# Patient Record
Sex: MALE | Race: WHITE | Employment: FULL TIME | URBAN - METROPOLITAN AREA
[De-identification: names, ages, dates, MRNs, and addresses within clinical notes are randomized per-mention and may not be internally consistent; named-entity substitution may affect disease eponyms.]

---

## 2019-03-06 ENCOUNTER — APPOINTMENT (OUTPATIENT)
Dept: GENERAL RADIOLOGY | Facility: CLINIC | Age: 49
DRG: 234 | End: 2019-03-06
Attending: EMERGENCY MEDICINE
Payer: COMMERCIAL

## 2019-03-06 ENCOUNTER — HOSPITAL ENCOUNTER (INPATIENT)
Facility: CLINIC | Age: 49
LOS: 11 days | Discharge: NURSING, REHAB OR LONG-TERM CARE | DRG: 234 | End: 2019-03-18
Attending: EMERGENCY MEDICINE | Admitting: HOSPITALIST
Payer: COMMERCIAL

## 2019-03-06 DIAGNOSIS — I21.4 NSTEMI (NON-ST ELEVATED MYOCARDIAL INFARCTION) (H): ICD-10-CM

## 2019-03-06 DIAGNOSIS — I25.10 CAD IN NATIVE ARTERY: Primary | ICD-10-CM

## 2019-03-06 DIAGNOSIS — Z95.1 S/P CABG (CORONARY ARTERY BYPASS GRAFT): ICD-10-CM

## 2019-03-06 DIAGNOSIS — E87.6 HYPOKALEMIA: ICD-10-CM

## 2019-03-06 LAB
ANION GAP SERPL CALCULATED.3IONS-SCNC: 10 MMOL/L (ref 3–14)
BASOPHILS # BLD AUTO: 0.1 10E9/L (ref 0–0.2)
BASOPHILS NFR BLD AUTO: 0.5 %
BUN SERPL-MCNC: 19 MG/DL (ref 7–30)
CALCIUM SERPL-MCNC: 8.6 MG/DL (ref 8.5–10.1)
CHLORIDE SERPL-SCNC: 97 MMOL/L (ref 94–109)
CO2 SERPL-SCNC: 28 MMOL/L (ref 20–32)
CREAT SERPL-MCNC: 1.17 MG/DL (ref 0.66–1.25)
DIFFERENTIAL METHOD BLD: ABNORMAL
EOSINOPHIL # BLD AUTO: 0.4 10E9/L (ref 0–0.7)
EOSINOPHIL NFR BLD AUTO: 2.7 %
ERYTHROCYTE [DISTWIDTH] IN BLOOD BY AUTOMATED COUNT: 13.3 % (ref 10–15)
GFR SERPL CREATININE-BSD FRML MDRD: 73 ML/MIN/{1.73_M2}
GLUCOSE SERPL-MCNC: 130 MG/DL (ref 70–99)
HCT VFR BLD AUTO: 45.2 % (ref 40–53)
HGB BLD-MCNC: 15.7 G/DL (ref 13.3–17.7)
IMM GRANULOCYTES # BLD: 0.1 10E9/L (ref 0–0.4)
IMM GRANULOCYTES NFR BLD: 0.5 %
INTERPRETATION ECG - MUSE: NORMAL
LYMPHOCYTES # BLD AUTO: 4.5 10E9/L (ref 0.8–5.3)
LYMPHOCYTES NFR BLD AUTO: 35.1 %
MCH RBC QN AUTO: 31.3 PG (ref 26.5–33)
MCHC RBC AUTO-ENTMCNC: 34.7 G/DL (ref 31.5–36.5)
MCV RBC AUTO: 90 FL (ref 78–100)
MONOCYTES # BLD AUTO: 1.2 10E9/L (ref 0–1.3)
MONOCYTES NFR BLD AUTO: 9.6 %
NEUTROPHILS # BLD AUTO: 6.6 10E9/L (ref 1.6–8.3)
NEUTROPHILS NFR BLD AUTO: 51.6 %
NRBC # BLD AUTO: 0 10*3/UL
NRBC BLD AUTO-RTO: 0 /100
PLATELET # BLD AUTO: 361 10E9/L (ref 150–450)
POTASSIUM SERPL-SCNC: 2.6 MMOL/L (ref 3.4–5.3)
RBC # BLD AUTO: 5.02 10E12/L (ref 4.4–5.9)
SODIUM SERPL-SCNC: 135 MMOL/L (ref 133–144)
TROPONIN I SERPL-MCNC: 0.04 UG/L (ref 0–0.04)
TROPONIN I SERPL-MCNC: 0.26 UG/L (ref 0–0.04)
WBC # BLD AUTO: 12.8 10E9/L (ref 4–11)

## 2019-03-06 PROCEDURE — 25000125 ZZHC RX 250: Performed by: EMERGENCY MEDICINE

## 2019-03-06 PROCEDURE — 83735 ASSAY OF MAGNESIUM: CPT | Performed by: EMERGENCY MEDICINE

## 2019-03-06 PROCEDURE — 99285 EMERGENCY DEPT VISIT HI MDM: CPT | Mod: 25

## 2019-03-06 PROCEDURE — 25000132 ZZH RX MED GY IP 250 OP 250 PS 637: Performed by: EMERGENCY MEDICINE

## 2019-03-06 PROCEDURE — 93005 ELECTROCARDIOGRAM TRACING: CPT

## 2019-03-06 PROCEDURE — 94640 AIRWAY INHALATION TREATMENT: CPT

## 2019-03-06 PROCEDURE — 85025 COMPLETE CBC W/AUTO DIFF WBC: CPT | Performed by: EMERGENCY MEDICINE

## 2019-03-06 PROCEDURE — 84484 ASSAY OF TROPONIN QUANT: CPT | Performed by: EMERGENCY MEDICINE

## 2019-03-06 PROCEDURE — 71046 X-RAY EXAM CHEST 2 VIEWS: CPT

## 2019-03-06 PROCEDURE — 80048 BASIC METABOLIC PNL TOTAL CA: CPT | Performed by: EMERGENCY MEDICINE

## 2019-03-06 PROCEDURE — 99223 1ST HOSP IP/OBS HIGH 75: CPT | Mod: AI | Performed by: HOSPITALIST

## 2019-03-06 RX ORDER — POTASSIUM CHLORIDE 1.5 G/1.58G
40 POWDER, FOR SOLUTION ORAL ONCE
Status: COMPLETED | OUTPATIENT
Start: 2019-03-06 | End: 2019-03-06

## 2019-03-06 RX ORDER — TRIAMTERENE AND HYDROCHLOROTHIAZIDE 75; 50 MG/1; MG/1
1 TABLET ORAL EVERY MORNING
COMMUNITY
End: 2019-03-30

## 2019-03-06 RX ORDER — IPRATROPIUM BROMIDE AND ALBUTEROL SULFATE 2.5; .5 MG/3ML; MG/3ML
3 SOLUTION RESPIRATORY (INHALATION) ONCE
Status: COMPLETED | OUTPATIENT
Start: 2019-03-06 | End: 2019-03-06

## 2019-03-06 RX ORDER — ASPIRIN 81 MG/1
324 TABLET, CHEWABLE ORAL ONCE
Status: DISCONTINUED | OUTPATIENT
Start: 2019-03-06 | End: 2019-03-06 | Stop reason: CLARIF

## 2019-03-06 RX ADMIN — POTASSIUM CHLORIDE 40 MEQ: 1.5 POWDER, FOR SOLUTION ORAL at 23:27

## 2019-03-06 RX ADMIN — IPRATROPIUM BROMIDE AND ALBUTEROL SULFATE 3 ML: .5; 2.5 SOLUTION RESPIRATORY (INHALATION) at 21:02

## 2019-03-06 RX ADMIN — POTASSIUM CHLORIDE 40 MEQ: 1.5 POWDER, FOR SOLUTION ORAL at 21:33

## 2019-03-06 ASSESSMENT — ENCOUNTER SYMPTOMS
DIAPHORESIS: 0
DIZZINESS: 0
FEVER: 0
NAUSEA: 0
DIARRHEA: 0
SORE THROAT: 0
COUGH: 1
ABDOMINAL PAIN: 0
LIGHT-HEADEDNESS: 1
CHILLS: 0
SHORTNESS OF BREATH: 1

## 2019-03-06 ASSESSMENT — MIFFLIN-ST. JEOR: SCORE: 2475.64

## 2019-03-07 ENCOUNTER — SURGERY (OUTPATIENT)
Age: 49
End: 2019-03-07
Payer: COMMERCIAL

## 2019-03-07 PROBLEM — I21.4 NSTEMI (NON-ST ELEVATED MYOCARDIAL INFARCTION) (H): Status: ACTIVE | Noted: 2019-03-07

## 2019-03-07 LAB
ALBUMIN SERPL-MCNC: 3.2 G/DL (ref 3.4–5)
ALP SERPL-CCNC: 81 U/L (ref 40–150)
ALT SERPL W P-5'-P-CCNC: 54 U/L (ref 0–70)
ANION GAP SERPL CALCULATED.3IONS-SCNC: 7 MMOL/L (ref 3–14)
AST SERPL W P-5'-P-CCNC: 45 U/L (ref 0–45)
BILIRUB DIRECT SERPL-MCNC: <0.1 MG/DL (ref 0–0.2)
BILIRUB SERPL-MCNC: 0.6 MG/DL (ref 0.2–1.3)
BUN SERPL-MCNC: 17 MG/DL (ref 7–30)
CALCIUM SERPL-MCNC: 8.7 MG/DL (ref 8.5–10.1)
CHLORIDE SERPL-SCNC: 99 MMOL/L (ref 94–109)
CHOLEST SERPL-MCNC: 229 MG/DL
CO2 SERPL-SCNC: 31 MMOL/L (ref 20–32)
CREAT SERPL-MCNC: 1.17 MG/DL (ref 0.66–1.25)
ERYTHROCYTE [DISTWIDTH] IN BLOOD BY AUTOMATED COUNT: 13.8 % (ref 10–15)
GFR SERPL CREATININE-BSD FRML MDRD: 73 ML/MIN/{1.73_M2}
GLUCOSE SERPL-MCNC: 97 MG/DL (ref 70–99)
HBA1C MFR BLD: 6.3 % (ref 0–5.6)
HCT VFR BLD AUTO: 41.5 % (ref 40–53)
HDLC SERPL-MCNC: 37 MG/DL
HGB BLD-MCNC: 14 G/DL (ref 13.3–17.7)
LDLC SERPL CALC-MCNC: 152 MG/DL
LMWH PPP CHRO-ACNC: 0.24 IU/ML
MAGNESIUM SERPL-MCNC: 2.2 MG/DL (ref 1.6–2.3)
MCH RBC QN AUTO: 31 PG (ref 26.5–33)
MCHC RBC AUTO-ENTMCNC: 33.7 G/DL (ref 31.5–36.5)
MCV RBC AUTO: 92 FL (ref 78–100)
NONHDLC SERPL-MCNC: 192 MG/DL
PLATELET # BLD AUTO: 288 10E9/L (ref 150–450)
PLATELET # BLD AUTO: 315 10E9/L (ref 150–450)
POTASSIUM SERPL-SCNC: 3.3 MMOL/L (ref 3.4–5.3)
POTASSIUM SERPL-SCNC: 3.4 MMOL/L (ref 3.4–5.3)
PROT SERPL-MCNC: 6.4 G/DL (ref 6.8–8.8)
RBC # BLD AUTO: 4.52 10E12/L (ref 4.4–5.9)
SODIUM SERPL-SCNC: 137 MMOL/L (ref 133–144)
TRIGL SERPL-MCNC: 201 MG/DL
TROPONIN I SERPL-MCNC: 1.77 UG/L (ref 0–0.04)
WBC # BLD AUTO: 8.8 10E9/L (ref 4–11)

## 2019-03-07 PROCEDURE — 25000125 ZZHC RX 250: Performed by: INTERNAL MEDICINE

## 2019-03-07 PROCEDURE — 85049 AUTOMATED PLATELET COUNT: CPT | Performed by: HOSPITALIST

## 2019-03-07 PROCEDURE — 86900 BLOOD TYPING SEROLOGIC ABO: CPT | Performed by: SURGERY

## 2019-03-07 PROCEDURE — 27210794 ZZH OR GENERAL SUPPLY STERILE: Performed by: INTERNAL MEDICINE

## 2019-03-07 PROCEDURE — 82803 BLOOD GASES ANY COMBINATION: CPT

## 2019-03-07 PROCEDURE — 25000132 ZZH RX MED GY IP 250 OP 250 PS 637: Performed by: HOSPITALIST

## 2019-03-07 PROCEDURE — 86901 BLOOD TYPING SEROLOGIC RH(D): CPT | Performed by: SURGERY

## 2019-03-07 PROCEDURE — B2111ZZ FLUOROSCOPY OF MULTIPLE CORONARY ARTERIES USING LOW OSMOLAR CONTRAST: ICD-10-PCS | Performed by: INTERNAL MEDICINE

## 2019-03-07 PROCEDURE — 36415 COLL VENOUS BLD VENIPUNCTURE: CPT | Performed by: HOSPITALIST

## 2019-03-07 PROCEDURE — C1769 GUIDE WIRE: HCPCS | Performed by: INTERNAL MEDICINE

## 2019-03-07 PROCEDURE — 83036 HEMOGLOBIN GLYCOSYLATED A1C: CPT | Performed by: HOSPITALIST

## 2019-03-07 PROCEDURE — 25000128 H RX IP 250 OP 636: Performed by: HOSPITALIST

## 2019-03-07 PROCEDURE — 99233 SBSQ HOSP IP/OBS HIGH 50: CPT | Performed by: HOSPITALIST

## 2019-03-07 PROCEDURE — 93005 ELECTROCARDIOGRAM TRACING: CPT

## 2019-03-07 PROCEDURE — 93458 L HRT ARTERY/VENTRICLE ANGIO: CPT | Performed by: INTERNAL MEDICINE

## 2019-03-07 PROCEDURE — 80048 BASIC METABOLIC PNL TOTAL CA: CPT | Performed by: HOSPITALIST

## 2019-03-07 PROCEDURE — 84484 ASSAY OF TROPONIN QUANT: CPT | Performed by: HOSPITALIST

## 2019-03-07 PROCEDURE — 99152 MOD SED SAME PHYS/QHP 5/>YRS: CPT | Performed by: INTERNAL MEDICINE

## 2019-03-07 PROCEDURE — 80061 LIPID PANEL: CPT | Performed by: HOSPITALIST

## 2019-03-07 PROCEDURE — 99153 MOD SED SAME PHYS/QHP EA: CPT | Performed by: INTERNAL MEDICINE

## 2019-03-07 PROCEDURE — C1894 INTRO/SHEATH, NON-LASER: HCPCS | Performed by: INTERNAL MEDICINE

## 2019-03-07 PROCEDURE — 99207 ZZC CDG-MDM COMPONENT: MEETS MODERATE - UP CODED: CPT | Performed by: HOSPITALIST

## 2019-03-07 PROCEDURE — 25800030 ZZH RX IP 258 OP 636: Performed by: INTERNAL MEDICINE

## 2019-03-07 PROCEDURE — 36415 COLL VENOUS BLD VENIPUNCTURE: CPT | Performed by: INTERNAL MEDICINE

## 2019-03-07 PROCEDURE — 21000001 ZZH R&B HEART CARE

## 2019-03-07 PROCEDURE — 93010 ELECTROCARDIOGRAM REPORT: CPT | Performed by: INTERNAL MEDICINE

## 2019-03-07 PROCEDURE — 25000128 H RX IP 250 OP 636: Performed by: INTERNAL MEDICINE

## 2019-03-07 PROCEDURE — 85027 COMPLETE CBC AUTOMATED: CPT | Performed by: INTERNAL MEDICINE

## 2019-03-07 PROCEDURE — 4A023N7 MEASUREMENT OF CARDIAC SAMPLING AND PRESSURE, LEFT HEART, PERCUTANEOUS APPROACH: ICD-10-PCS | Performed by: INTERNAL MEDICINE

## 2019-03-07 PROCEDURE — 99223 1ST HOSP IP/OBS HIGH 75: CPT | Mod: 25 | Performed by: INTERNAL MEDICINE

## 2019-03-07 PROCEDURE — 80076 HEPATIC FUNCTION PANEL: CPT | Performed by: HOSPITALIST

## 2019-03-07 PROCEDURE — 85520 HEPARIN ASSAY: CPT | Performed by: INTERNAL MEDICINE

## 2019-03-07 PROCEDURE — 84132 ASSAY OF SERUM POTASSIUM: CPT | Performed by: HOSPITALIST

## 2019-03-07 PROCEDURE — 86850 RBC ANTIBODY SCREEN: CPT | Performed by: SURGERY

## 2019-03-07 PROCEDURE — 25000132 ZZH RX MED GY IP 250 OP 250 PS 637: Performed by: INTERNAL MEDICINE

## 2019-03-07 PROCEDURE — 93458 L HRT ARTERY/VENTRICLE ANGIO: CPT | Mod: 26 | Performed by: INTERNAL MEDICINE

## 2019-03-07 RX ORDER — POTASSIUM CHLORIDE 1.5 G/1.58G
20-40 POWDER, FOR SOLUTION ORAL
Status: DISCONTINUED | OUTPATIENT
Start: 2019-03-07 | End: 2019-03-12

## 2019-03-07 RX ORDER — LIDOCAINE 40 MG/G
CREAM TOPICAL
Status: DISCONTINUED | OUTPATIENT
Start: 2019-03-07 | End: 2019-03-07

## 2019-03-07 RX ORDER — PROCHLORPERAZINE MALEATE 10 MG
10 TABLET ORAL EVERY 6 HOURS PRN
Status: DISCONTINUED | OUTPATIENT
Start: 2019-03-07 | End: 2019-03-18 | Stop reason: HOSPADM

## 2019-03-07 RX ORDER — AMOXICILLIN 250 MG
1 CAPSULE ORAL 2 TIMES DAILY PRN
Status: DISCONTINUED | OUTPATIENT
Start: 2019-03-07 | End: 2019-03-18 | Stop reason: HOSPADM

## 2019-03-07 RX ORDER — HYDROCODONE BITARTRATE AND ACETAMINOPHEN 5; 325 MG/1; MG/1
1-2 TABLET ORAL EVERY 4 HOURS PRN
Status: DISCONTINUED | OUTPATIENT
Start: 2019-03-07 | End: 2019-03-10

## 2019-03-07 RX ORDER — MAGNESIUM SULFATE HEPTAHYDRATE 40 MG/ML
2 INJECTION, SOLUTION INTRAVENOUS DAILY PRN
Status: DISCONTINUED | OUTPATIENT
Start: 2019-03-07 | End: 2019-03-12

## 2019-03-07 RX ORDER — VERAPAMIL HYDROCHLORIDE 2.5 MG/ML
INJECTION, SOLUTION INTRAVENOUS
Status: DISCONTINUED | OUTPATIENT
Start: 2019-03-07 | End: 2019-03-07 | Stop reason: HOSPADM

## 2019-03-07 RX ORDER — SODIUM CHLORIDE 9 MG/ML
INJECTION, SOLUTION INTRAVENOUS CONTINUOUS
Status: DISCONTINUED | OUTPATIENT
Start: 2019-03-07 | End: 2019-03-10 | Stop reason: CLARIF

## 2019-03-07 RX ORDER — ACETAMINOPHEN 325 MG/1
975 TABLET ORAL EVERY 6 HOURS PRN
COMMUNITY
End: 2019-03-26 | Stop reason: DRUGHIGH

## 2019-03-07 RX ORDER — NALOXONE HYDROCHLORIDE 0.4 MG/ML
.1-.4 INJECTION, SOLUTION INTRAMUSCULAR; INTRAVENOUS; SUBCUTANEOUS
Status: DISCONTINUED | OUTPATIENT
Start: 2019-03-07 | End: 2019-03-12

## 2019-03-07 RX ORDER — FENTANYL CITRATE 50 UG/ML
INJECTION, SOLUTION INTRAMUSCULAR; INTRAVENOUS
Status: DISCONTINUED | OUTPATIENT
Start: 2019-03-07 | End: 2019-03-07 | Stop reason: HOSPADM

## 2019-03-07 RX ORDER — NALOXONE HYDROCHLORIDE 0.4 MG/ML
.2-.4 INJECTION, SOLUTION INTRAMUSCULAR; INTRAVENOUS; SUBCUTANEOUS
Status: DISCONTINUED | OUTPATIENT
Start: 2019-03-07 | End: 2019-03-07

## 2019-03-07 RX ORDER — AMLODIPINE BESYLATE 5 MG/1
5 TABLET ORAL DAILY
Status: DISCONTINUED | OUTPATIENT
Start: 2019-03-07 | End: 2019-03-18 | Stop reason: HOSPADM

## 2019-03-07 RX ORDER — AMLODIPINE BESYLATE 10 MG/1
10 TABLET ORAL DAILY
COMMUNITY

## 2019-03-07 RX ORDER — FLUMAZENIL 0.1 MG/ML
0.2 INJECTION, SOLUTION INTRAVENOUS
Status: ACTIVE | OUTPATIENT
Start: 2019-03-07 | End: 2019-03-08

## 2019-03-07 RX ORDER — HEPARIN SODIUM 1000 [USP'U]/ML
INJECTION, SOLUTION INTRAVENOUS; SUBCUTANEOUS
Status: DISCONTINUED | OUTPATIENT
Start: 2019-03-07 | End: 2019-03-07 | Stop reason: HOSPADM

## 2019-03-07 RX ORDER — NALOXONE HYDROCHLORIDE 0.4 MG/ML
.1-.4 INJECTION, SOLUTION INTRAMUSCULAR; INTRAVENOUS; SUBCUTANEOUS
Status: DISCONTINUED | OUTPATIENT
Start: 2019-03-07 | End: 2019-03-07

## 2019-03-07 RX ORDER — LEVOTHYROXINE SODIUM 50 UG/1
50 TABLET ORAL DAILY
COMMUNITY

## 2019-03-07 RX ORDER — LORAZEPAM 0.5 MG/1
0.5 TABLET ORAL
Status: DISCONTINUED | OUTPATIENT
Start: 2019-03-07 | End: 2019-03-07 | Stop reason: HOSPADM

## 2019-03-07 RX ORDER — ATROPINE SULFATE 0.1 MG/ML
0.5 INJECTION INTRAVENOUS EVERY 5 MIN PRN
Status: ACTIVE | OUTPATIENT
Start: 2019-03-07 | End: 2019-03-08

## 2019-03-07 RX ORDER — ASPIRIN 81 MG/1
81 TABLET ORAL DAILY
Status: DISCONTINUED | OUTPATIENT
Start: 2019-03-08 | End: 2019-03-12

## 2019-03-07 RX ORDER — AMOXICILLIN 250 MG
1 CAPSULE ORAL 2 TIMES DAILY
Status: DISCONTINUED | OUTPATIENT
Start: 2019-03-07 | End: 2019-03-12

## 2019-03-07 RX ORDER — NITROGLYCERIN 5 MG/ML
VIAL (ML) INTRAVENOUS
Status: DISCONTINUED | OUTPATIENT
Start: 2019-03-07 | End: 2019-03-07 | Stop reason: HOSPADM

## 2019-03-07 RX ORDER — PROCHLORPERAZINE 25 MG
25 SUPPOSITORY, RECTAL RECTAL EVERY 12 HOURS PRN
Status: DISCONTINUED | OUTPATIENT
Start: 2019-03-07 | End: 2019-03-18 | Stop reason: HOSPADM

## 2019-03-07 RX ORDER — LIDOCAINE 40 MG/G
CREAM TOPICAL
Status: DISCONTINUED | OUTPATIENT
Start: 2019-03-07 | End: 2019-03-07 | Stop reason: HOSPADM

## 2019-03-07 RX ORDER — ONDANSETRON 4 MG/1
4 TABLET, ORALLY DISINTEGRATING ORAL EVERY 6 HOURS PRN
Status: DISCONTINUED | OUTPATIENT
Start: 2019-03-07 | End: 2019-03-12

## 2019-03-07 RX ORDER — METOCLOPRAMIDE HYDROCHLORIDE 5 MG/ML
10 INJECTION INTRAMUSCULAR; INTRAVENOUS EVERY 6 HOURS PRN
Status: DISCONTINUED | OUTPATIENT
Start: 2019-03-07 | End: 2019-03-18 | Stop reason: HOSPADM

## 2019-03-07 RX ORDER — POTASSIUM CHLORIDE 29.8 MG/ML
20 INJECTION INTRAVENOUS
Status: DISCONTINUED | OUTPATIENT
Start: 2019-03-07 | End: 2019-03-12

## 2019-03-07 RX ORDER — LOSARTAN POTASSIUM 25 MG/1
25 TABLET ORAL DAILY
Status: DISCONTINUED | OUTPATIENT
Start: 2019-03-07 | End: 2019-03-18 | Stop reason: HOSPADM

## 2019-03-07 RX ORDER — FENTANYL CITRATE 50 UG/ML
25-50 INJECTION, SOLUTION INTRAMUSCULAR; INTRAVENOUS
Status: ACTIVE | OUTPATIENT
Start: 2019-03-07 | End: 2019-03-08

## 2019-03-07 RX ORDER — LORAZEPAM 0.5 MG/1
0.5 TABLET ORAL
Status: DISCONTINUED | OUTPATIENT
Start: 2019-03-07 | End: 2019-03-12 | Stop reason: HOSPADM

## 2019-03-07 RX ORDER — ACETAMINOPHEN 325 MG/1
325-650 TABLET ORAL EVERY 4 HOURS PRN
Status: DISCONTINUED | OUTPATIENT
Start: 2019-03-07 | End: 2019-03-11

## 2019-03-07 RX ORDER — IBUPROFEN 200 MG
200-400 TABLET ORAL EVERY 4 HOURS PRN
Status: ON HOLD | COMMUNITY
End: 2019-03-18

## 2019-03-07 RX ORDER — ONDANSETRON 2 MG/ML
4 INJECTION INTRAMUSCULAR; INTRAVENOUS EVERY 6 HOURS PRN
Status: DISCONTINUED | OUTPATIENT
Start: 2019-03-07 | End: 2019-03-12

## 2019-03-07 RX ORDER — LORAZEPAM 2 MG/ML
0.5 INJECTION INTRAMUSCULAR
Status: DISCONTINUED | OUTPATIENT
Start: 2019-03-07 | End: 2019-03-12 | Stop reason: HOSPADM

## 2019-03-07 RX ORDER — AMOXICILLIN 250 MG
2 CAPSULE ORAL 2 TIMES DAILY PRN
Status: DISCONTINUED | OUTPATIENT
Start: 2019-03-07 | End: 2019-03-18 | Stop reason: HOSPADM

## 2019-03-07 RX ORDER — MAGNESIUM SULFATE HEPTAHYDRATE 40 MG/ML
4 INJECTION, SOLUTION INTRAVENOUS EVERY 4 HOURS PRN
Status: DISCONTINUED | OUTPATIENT
Start: 2019-03-07 | End: 2019-03-12

## 2019-03-07 RX ORDER — LIDOCAINE 40 MG/G
CREAM TOPICAL
Status: DISCONTINUED | OUTPATIENT
Start: 2019-03-07 | End: 2019-03-15

## 2019-03-07 RX ORDER — ATORVASTATIN CALCIUM 20 MG/1
20 TABLET, FILM COATED ORAL EVERY EVENING
Status: DISCONTINUED | OUTPATIENT
Start: 2019-03-07 | End: 2019-03-08

## 2019-03-07 RX ORDER — AMOXICILLIN 250 MG
2 CAPSULE ORAL 2 TIMES DAILY
Status: DISCONTINUED | OUTPATIENT
Start: 2019-03-07 | End: 2019-03-12

## 2019-03-07 RX ORDER — LOSARTAN POTASSIUM 25 MG/1
25 TABLET ORAL DAILY
COMMUNITY

## 2019-03-07 RX ORDER — LORAZEPAM 2 MG/ML
0.5 INJECTION INTRAMUSCULAR
Status: DISCONTINUED | OUTPATIENT
Start: 2019-03-07 | End: 2019-03-07 | Stop reason: HOSPADM

## 2019-03-07 RX ORDER — SODIUM CHLORIDE 9 MG/ML
INJECTION, SOLUTION INTRAVENOUS CONTINUOUS
Status: DISCONTINUED | OUTPATIENT
Start: 2019-03-07 | End: 2019-03-07 | Stop reason: HOSPADM

## 2019-03-07 RX ORDER — IPRATROPIUM BROMIDE AND ALBUTEROL SULFATE 2.5; .5 MG/3ML; MG/3ML
3 SOLUTION RESPIRATORY (INHALATION) EVERY 4 HOURS PRN
Status: DISCONTINUED | OUTPATIENT
Start: 2019-03-07 | End: 2019-03-18 | Stop reason: HOSPADM

## 2019-03-07 RX ORDER — POTASSIUM CL/LIDO/0.9 % NACL 10MEQ/0.1L
10 INTRAVENOUS SOLUTION, PIGGYBACK (ML) INTRAVENOUS
Status: DISCONTINUED | OUTPATIENT
Start: 2019-03-07 | End: 2019-03-12

## 2019-03-07 RX ORDER — POTASSIUM CHLORIDE 1500 MG/1
20 TABLET, EXTENDED RELEASE ORAL
Status: DISCONTINUED | OUTPATIENT
Start: 2019-03-07 | End: 2019-03-07 | Stop reason: HOSPADM

## 2019-03-07 RX ORDER — ACETAMINOPHEN 325 MG/1
650 TABLET ORAL EVERY 4 HOURS PRN
Status: DISCONTINUED | OUTPATIENT
Start: 2019-03-07 | End: 2019-03-11

## 2019-03-07 RX ORDER — METOCLOPRAMIDE 5 MG/1
10 TABLET ORAL EVERY 6 HOURS PRN
Status: DISCONTINUED | OUTPATIENT
Start: 2019-03-07 | End: 2019-03-18 | Stop reason: HOSPADM

## 2019-03-07 RX ORDER — POTASSIUM CHLORIDE 7.45 MG/ML
10 INJECTION INTRAVENOUS
Status: DISCONTINUED | OUTPATIENT
Start: 2019-03-07 | End: 2019-03-12

## 2019-03-07 RX ORDER — POTASSIUM CHLORIDE 1500 MG/1
20-40 TABLET, EXTENDED RELEASE ORAL
Status: DISCONTINUED | OUTPATIENT
Start: 2019-03-07 | End: 2019-03-12

## 2019-03-07 RX ORDER — BISACODYL 10 MG
10 SUPPOSITORY, RECTAL RECTAL DAILY PRN
Status: DISCONTINUED | OUTPATIENT
Start: 2019-03-07 | End: 2019-03-18 | Stop reason: HOSPADM

## 2019-03-07 RX ORDER — METOPROLOL SUCCINATE 25 MG/1
25 TABLET, EXTENDED RELEASE ORAL DAILY
Status: DISCONTINUED | OUTPATIENT
Start: 2019-03-07 | End: 2019-03-12

## 2019-03-07 RX ORDER — POTASSIUM CHLORIDE 1500 MG/1
20 TABLET, EXTENDED RELEASE ORAL
Status: DISCONTINUED | OUTPATIENT
Start: 2019-03-07 | End: 2019-03-12 | Stop reason: HOSPADM

## 2019-03-07 RX ORDER — NICOTINE 21 MG/24HR
1 PATCH, TRANSDERMAL 24 HOURS TRANSDERMAL DAILY PRN
Status: DISCONTINUED | OUTPATIENT
Start: 2019-03-07 | End: 2019-03-10 | Stop reason: CLARIF

## 2019-03-07 RX ADMIN — VERAPAMIL HYDROCHLORIDE 2.5 MG: 2.5 INJECTION, SOLUTION INTRAVENOUS at 13:48

## 2019-03-07 RX ADMIN — POTASSIUM CHLORIDE 20 MEQ: 1500 TABLET, EXTENDED RELEASE ORAL at 09:21

## 2019-03-07 RX ADMIN — MIDAZOLAM 1 MG: 1 INJECTION INTRAMUSCULAR; INTRAVENOUS at 13:47

## 2019-03-07 RX ADMIN — MIDAZOLAM 1 MG: 1 INJECTION INTRAMUSCULAR; INTRAVENOUS at 13:44

## 2019-03-07 RX ADMIN — HEPARIN SODIUM 1300 UNITS/HR: 10000 INJECTION, SOLUTION INTRAVENOUS at 10:25

## 2019-03-07 RX ADMIN — POTASSIUM CHLORIDE 20 MEQ: 1500 TABLET, EXTENDED RELEASE ORAL at 16:21

## 2019-03-07 RX ADMIN — NITROGLYCERIN 200 MCG: 5 INJECTION, SOLUTION INTRAVENOUS at 13:48

## 2019-03-07 RX ADMIN — ASPIRIN 325 MG: 325 TABLET, DELAYED RELEASE ORAL at 08:03

## 2019-03-07 RX ADMIN — FENTANYL CITRATE 50 MCG: 50 INJECTION, SOLUTION INTRAMUSCULAR; INTRAVENOUS at 13:43

## 2019-03-07 RX ADMIN — AMLODIPINE BESYLATE 5 MG: 5 TABLET ORAL at 08:03

## 2019-03-07 RX ADMIN — SENNOSIDES AND DOCUSATE SODIUM 1 TABLET: 8.6; 5 TABLET ORAL at 20:49

## 2019-03-07 RX ADMIN — SODIUM CHLORIDE: 9 INJECTION, SOLUTION INTRAVENOUS at 11:09

## 2019-03-07 RX ADMIN — LIDOCAINE HYDROCHLORIDE 1 ML: 10 INJECTION, SOLUTION INFILTRATION; PERINEURAL at 13:48

## 2019-03-07 RX ADMIN — LOSARTAN POTASSIUM 25 MG: 25 TABLET ORAL at 08:03

## 2019-03-07 RX ADMIN — POTASSIUM CHLORIDE 40 MEQ: 1500 TABLET, EXTENDED RELEASE ORAL at 06:48

## 2019-03-07 RX ADMIN — HEPARIN SODIUM 5000 UNITS: 1000 INJECTION, SOLUTION INTRAVENOUS; SUBCUTANEOUS at 13:49

## 2019-03-07 RX ADMIN — FENTANYL CITRATE 50 MCG: 50 INJECTION, SOLUTION INTRAMUSCULAR; INTRAVENOUS at 13:48

## 2019-03-07 RX ADMIN — FENTANYL CITRATE 50 MCG: 50 INJECTION, SOLUTION INTRAMUSCULAR; INTRAVENOUS at 13:59

## 2019-03-07 RX ADMIN — MIDAZOLAM 1 MG: 1 INJECTION INTRAMUSCULAR; INTRAVENOUS at 13:59

## 2019-03-07 RX ADMIN — METOPROLOL SUCCINATE 25 MG: 25 TABLET, EXTENDED RELEASE ORAL at 20:49

## 2019-03-07 RX ADMIN — ENOXAPARIN SODIUM 40 MG: 40 INJECTION SUBCUTANEOUS at 04:01

## 2019-03-07 RX ADMIN — ATORVASTATIN CALCIUM 20 MG: 20 TABLET, FILM COATED ORAL at 20:49

## 2019-03-07 RX ADMIN — GUAIFENESIN 10 ML: 200 SOLUTION ORAL at 22:14

## 2019-03-07 ASSESSMENT — ACTIVITIES OF DAILY LIVING (ADL)
ADLS_ACUITY_SCORE: 10

## 2019-03-07 ASSESSMENT — MIFFLIN-ST. JEOR: SCORE: 2439.36

## 2019-03-07 NOTE — PLAN OF CARE
Pt stable over night, VSS, no c/o Chest pain or sob, Troponin elevated to 1.7(MD aware).  K+ replaced this AM.  Pt is NPO for UMP[facility] to see and an Echo will be done.

## 2019-03-07 NOTE — ED NOTES
"[facility]  ED Nurse Handoff Report    ED Chief complaint: Chest Pain (Chest pain started 1930 while at home on the couch. Took 325 ASA and 400 Ibuprofen. No known heart history )      ED Diagnosis:   Final diagnoses:   NSTEMI (non-ST elevated myocardial infarction) (H)       Code Status: Full Code    Allergies:   Allergies   Allergen Reactions     Codeine      Hypotension/ Dizziness          Activity level - Baseline/Home:  Independent    Activity Level - Current:   Independent     Needed?: No    Isolation: No  Infection: Not Applicable  Bariatric?: Yes    Vital Signs:   Vitals:    03/06/19 1957 03/06/19 1959 03/06/19 2144   BP:  (!) 152/94 (!) 143/94   Pulse: 87     Resp: 20     Temp: 97.8  F (36.6  C)     TempSrc: Temporal     SpO2: 97%  97%   Weight: (!) 151.5 kg (334 lb)     Height: 1.905 m (6' 3\")         Cardiac Rhythm: ,        Pain level: 0-10 Pain Scale: 4    Is this patient confused?: No   Does this patient have a guardian?  No         If yes, is there guardianship documents in the Epic \"Code/ACP\" activity?  No         Guardian Notified?  N/A  [place] - Suicide Severity Rating Scale Completed?  Yes  If yes, what color did the patient score?  White    Patient Report: Initial Complaint: chest pain  Focused Assessment: Delightful gentleman who came after sudden onset of pressure chest pain.  He is severely over weight. Hx hypertension but not followed by a cardiologist.   Tests Performed: CXR LABS  Abnormal Results:   Results for orders placed or performed during the hospital encounter of 03/06/19   XR Chest 2 Views    Narrative    CHEST TWO VIEWS  3/6/2019 9:31 PM     HISTORY: Chest pain    COMPARISON: None.      Impression    IMPRESSION: Normal.    SUSANA PEREZ MD   CBC with platelets differential   Result Value Ref Range    WBC 12.8 (H) 4.0 - 11.0 10e9/L    RBC Count 5.02 4.4 - 5.9 10e12/L    Hemoglobin 15.7 13.3 - 17.7 g/dL    Hematocrit 45.2 40.0 - 53.0 %    MCV 90 78 - 100 " fl    MCH 31.3 26.5 - 33.0 pg    MCHC 34.7 31.5 - 36.5 g/dL    RDW 13.3 10.0 - 15.0 %    Platelet Count 361 150 - 450 10e9/L    Diff Method Automated Method     % Neutrophils 51.6 %    % Lymphocytes 35.1 %    % Monocytes 9.6 %    % Eosinophils 2.7 %    % Basophils 0.5 %    % Immature Granulocytes 0.5 %    Nucleated RBCs 0 0 /100    Absolute Neutrophil 6.6 1.6 - 8.3 10e9/L    Absolute Lymphocytes 4.5 0.8 - 5.3 10e9/L    Absolute Monocytes 1.2 0.0 - 1.3 10e9/L    Absolute Eosinophils 0.4 0.0 - 0.7 10e9/L    Absolute Basophils 0.1 0.0 - 0.2 10e9/L    Abs Immature Granulocytes 0.1 0 - 0.4 10e9/L    Absolute Nucleated RBC 0.0    Basic metabolic panel   Result Value Ref Range    Sodium 135 133 - 144 mmol/L    Potassium 2.6 (LL) 3.4 - 5.3 mmol/L    Chloride 97 94 - 109 mmol/L    Carbon Dioxide 28 20 - 32 mmol/L    Anion Gap 10 3 - 14 mmol/L    Glucose 130 (H) 70 - 99 mg/dL    Urea Nitrogen 19 7 - 30 mg/dL    Creatinine 1.17 0.66 - 1.25 mg/dL    GFR Estimate 73 >60 mL/min/[1.73_m2]    GFR Estimate If Black 85 >60 mL/min/[1.73_m2]    Calcium 8.6 8.5 - 10.1 mg/dL   Troponin I   Result Value Ref Range    Troponin I ES 0.036 0.000 - 0.045 ug/L   Troponin I (now)   Result Value Ref Range    Troponin I ES 0.255 (HH) 0.000 - 0.045 ug/L   EKG 12-lead, tracing only   Result Value Ref Range    Interpretation ECG Click View Image link to view waveform and result         Treatments provided: meds     Family Comments: unknown    OBS brochure/video discussed/provided to patient/family: No              Name of person given brochure if not patient:               Relationship to patient:     ED Medications:   Medications   ipratropium - albuterol 0.5 mg/2.5 mg/3 mL (DUONEB) neb solution 3 mL (3 mLs Nebulization Given 3/6/19 2102)   potassium chloride (KLOR-CON) Packet 40 mEq (40 mEq Oral Given 3/6/19 2133)       Drips infusing?:  No    For the majority of the shift this patient was Green.   Interventions performed were     Severe Sepsis OR  Septic Shock Diagnosis Present: No    To be done/followed up on inpatient unit:      ED NURSE PHONE NUMBER: [phone]

## 2019-03-07 NOTE — PROGRESS NOTES
RECEIVING UNIT ED HANDOFF REVIEW    ED Nurse Handoff Report was reviewed by: Don Landis on March 7, 2019 at 12:26 AM

## 2019-03-07 NOTE — PROVIDER NOTIFICATION
Brief update:    Paged re: elevated troponin.    Here with NSTEMI.  Received lovenox subcutaneous at ~4 AM    Chest pain free, but troponin >1 at this time.    No further interventions currently as pt anticoagulated w/ lovenox at this time. Await cardiology evaluation.    Mk Gray MD  6:30 AM

## 2019-03-07 NOTE — PROVIDER NOTIFICATION
MD Notification    Notified Person: MD    Notified Person Name:Dr Gray       Notification Date/Time:3/7/2019  0630    Notification Interaction:    Purpose of Notification:elevated Troponin of 1.7    Orders Received:none , continue to monitor.    Comments:

## 2019-03-07 NOTE — PHARMACY-ADMISSION MEDICATION HISTORY
Admission medication history interview status for the 3/6/2019  admission is complete. See EPIC admission navigator for prior to admission medications     Medication history source reliability:Moderate    Actions taken by pharmacist (provider contacted, etc): Contacted [facility] in [place] for updated medication list and verified with patient.     Additional medication history information not noted on PTA med list :   - Clinic indicates patient should be taking Synthroid 50mcg daily, but patient states he last took about 1 month ago.    Medication reconciliation/reorder completed by provider prior to medication history? Yes    Time spent in this activity: 15 minutes    Prior to Admission medications    Medication Sig Last Dose Taking? Auth Provider   acetaminophen (TYLENOL) 325 MG tablet Take 325-650 mg by mouth every 6 hours as needed for mild pain prn Yes Unknown, Entered By History   amLODIPine (NORVASC) 10 MG tablet Take 10 mg by mouth daily 3/5/2019 at pm Yes Unknown, Entered By History   aspirin (ASA) 325 MG EC tablet Take 325 mg by mouth daily as needed for moderate pain  prn Yes Unknown, Entered By History   ibuprofen (ADVIL/MOTRIN) 200 MG tablet Take 200-400 mg by mouth every 4 hours as needed for mild pain prn Yes Unknown, Entered By History   levothyroxine (SYNTHROID/LEVOTHROID) 50 MCG tablet Take 50 mcg by mouth daily Past Month at Unknown time Yes Unknown, Entered By History   losartan (COZAAR) 25 MG tablet Take 25 mg by mouth daily 3/5/2019 at pm Yes Unknown, Entered By History   triamterene-HCTZ (MAXZIDE) 75-50 MG tablet Take 1 tablet by mouth every morning  3/5/2019 at pm Yes Reported, Patient

## 2019-03-07 NOTE — H&P
[facility]    History and Physical - Hospitalist Service       Date of Admission:  3/6/2019    Assessment & Plan   Edilberto Brooks is a 48 year old male with multiple risk factors for ischemic heart disease who presented to the emergency room with anterior chest pressure and it was initially found to have a troponin of 0.036 but repeat was 0.255.  He had no ST segment depressions or elevations on EKG.  Chest x-ray was negative.  He was also complaining of a productive cough and wheezing for about 10 days and was wheezing on exam.  He received a DuoNeb after which the wheezing resolved.    Non-ST-elevation myocardial infarction   No beta-blocker for now due to the bronchospasm  Continue aspirin  Repeat troponin and echocardiogram in the morning  Cardiology consultation  Keep n.p.o. for possible procedure    Probable acute bronchospasm   Tobacco use disorder   The patient has no diagnosis of asthma or COPD.  He may have had an upper respiratory infection which led to bronchospasm.  He knew he was wheezing for about a week.  His wheezing has completely resolved with 1 DuoNeb.  Chest x-ray was negative for pneumonia.  Nicotine patch ordered as needed    Hypertension   Continue amlodipine and losartan.  Hold diuretics due to the hypokalemia    Hypokalemia  He was given 80 mEq of potassium in the ER.  Continue to replace as needed per protocol.  Magnesium level was 2.2.    Hyperlipidemia   He is not on a statin but will likely need to start one.  We will check a lipid profile in the morning.    Diet: NPO for Medical/Clinical Reasons Except for: Meds    DVT Prophylaxis: Enoxaparin (Lovenox) SQ  Caraballo Catheter: not present  Code Status: Full    Disposition Plan   Expected discharge: Tomorrow, recommended to prior living arrangement once his cardiac status is stable.  Entered: Don Leon MD 03/06/2019, 11:37 PM     The patient's care was discussed with the Patient.    Don Leon,  MD  [facility]    ______________________________________________________________________    Chief Complaint   Chest pain     History is obtained from the patient, electronic health record and emergency department physician    History of Present Illness   Edilberto Brooks is a 48 year old male who is employed as an  with [employer].  He is obese with a BMI of around 40.  He is on 4 antihypertensive medications.  He smokes about 1/2 pack of cigarettes a day.  He thinks he has high cholesterol but does not take a statin.  His mother had an MI in her early 60s.  He does not think his father had ischemic heart disease but has a pacemaker.  The patient has no personal history of ischemic heart disease.  This evening after work the patient was sitting on his sofa playing a computer game when he noticed that he was experiencing tightness in his anterior chest.  He thought he had pulled a muscle and tried to stretch out his arms and chest with exercise bands.  He also took ibuprofen and aspirin.  He realized that his pain was not getting any better and in fact it was slightly worse and he was feeling a little short of breath and lightheaded.  He decided to come to the emergency room to be evaluated.  For the past 9 or 10 days patient has had a productive cough and initially had a fever which has resolved.  He has no diagnosis of asthma or COPD.  He says that he does not have a chronic smoker's cough.  He does not use any inhaled bronchodilators at home.  He did notice that he has been wheezing for about a week.  In the emergency room blood pressure was 152/94 temperature 97.8 F, pulse 87, respiratory 20, oxygen saturation 97%, weight 151.5 kg.  He had expiratory wheezing on exam.  His heart was regular with no murmur.  EKG showed sinus rhythm with PVCs.  Chest x-ray was negative.  Labs notable for creatinine 1.17, potassium 2.6.  First troponin was 0.036 and a repeat aura to 0.255.  He received a  DuoNeb and a total of 80 mEq of oral potassium chloride.      Review of Systems    The 10 point Review of Systems is negative other than noted in the HPI or here.     Past Medical History    I have reviewed this patient's medical history and updated it with pertinent information if needed.   Past Medical History:   Diagnosis Date     Hypertension      Tobacco use disorder, continuous        Past Surgical History   I have reviewed this patient's surgical history and updated it with pertinent information if needed.  History reviewed. No pertinent surgical history.    Social History   I have reviewed this patient's social history and updated it with pertinent information if needed.  Social History     Tobacco Use     Smoking status: Current Every Day Smoker     Packs/day: 0.50     Years: 17.00     Pack years: 8.50     Smokeless tobacco: Never Used   Substance Use Topics     Alcohol use: Yes     Comment: social      Drug use: No       Family History   I have reviewed this patient's family history and updated it with pertinent information if needed.   History reviewed. No pertinent family history.    Prior to Admission Medications   Prior to Admission Medications   Prescriptions Last Dose Informant Patient Reported? Taking?   amLODIPine (NORVASC) 1 mg/mL SUSP 3/5/2019 at Unknown time  Yes Yes   Sig: Take by mouth daily   losartan (COZAAR) 2.5 mg/mL SUSP 3/5/2019 at Unknown time  Yes Yes   Sig: Take by mouth daily   triamterene-HCTZ (DYAZIDE) 37.5-25 MG capsule 3/5/2019 at Unknown time  Yes Yes   Sig: Take by mouth every morning      Facility-Administered Medications: None     Allergies   Allergies   Allergen Reactions     Codeine      Hypotension/ Dizziness          Physical Exam   Vital Signs: Temp: 97.8  F (36.6  C) Temp src: Temporal BP: (!) 160/91 Pulse: 87 Heart Rate: 82 Resp: 11 SpO2: 94 % O2 Device: None (Room air)     Weight: 334 lbs 0 oz   Body mass index is 40.75 kg/m .      Constitutional: awake, alert,  cooperative, no apparent distress, and appears stated age  Eyes: Lids and lashes normal, pupils equal, sclera clear, conjunctiva normal  Respiratory: No increased work of breathing, good air exchange, clear to auscultation bilaterally, no crackles or wheezing  Cardiovascular:  regular rate and rhythm, normal S1 and S2, no S3 or S4, and no murmur noted  GI:  normal bowel sounds, soft, non-distended, non-tender, no masses palpated. There is a partially reducible umbilical hernia  Skin: no rashes and no jaundice  Musculoskeletal: There is no redness, warmth, or swelling of the joints.  Full range of motion noted.   Neurologic: Awake, alert, oriented to name, place and time.  Cranial nerves II-XII are grossly intact.  Motor is 5 out of 5 bilaterally  Neuropsychiatric: General: normal, calm and normal eye contact    Data   Data reviewed today: I reviewed all medications, new labs and imaging results over the last 24 hours. I personally reviewed the EKG tracing showing   Sinus rhythm with PVCs and Q waves in V1 and V2; chest X-ray which shows no I/E.    Recent Labs   Lab 03/06/19 2138 03/06/19 2017   WBC  --  12.8*   HGB  --  15.7   MCV  --  90   PLT  --  361   NA  --  135   POTASSIUM  --  2.6*   CHLORIDE  --  97   CO2  --  28   BUN  --  19   CR  --  1.17   ANIONGAP  --  10   KAREN  --  8.6   GLC  --  130*   TROPI 0.255* 0.036     Recent Results (from the past 24 hour(s))   XR Chest 2 Views    Narrative    CHEST TWO VIEWS  3/6/2019 9:31 PM     HISTORY: Chest pain    COMPARISON: None.      Impression    IMPRESSION: Normal.    SUSANA PEREZ MD

## 2019-03-07 NOTE — ED PROVIDER NOTES
History     Chief Complaint:  Chest Pain    HPI   Edilberto Brooks is a 48 year old male with a history of hypertension who presents with chest pain. The patient states he got home from work around 1830 this evening and sat down on the couch to play his computer game. Then around 1930, he notes he began to notice some tightness in his chest. He reports he tried doing some stretching and exercises, but ultimately took 325 mg Aspirin and 400 mg Ibuprofen without much relief. In fact, the patient notes his chest tightness progressively worsened and he developed difficulty breathing and bilateral arm tingling and some lightheadedness, so he decided to come to the ED for further evaluation. Here in the ED, the patient reports he has been battling a cough and some congestion for the past few weeks, and notes he has had some chest tightness associated with cold symptoms in the past, but it has not lasted this long. He denies any radiation of the pain into his jaw, back, or neck. He also denies any nausea, dizziness, diaphoresis, abdominal pain, diarrhea, sore throat, rash, or other acute symptoms.    CARDIAC RISK FACTORS:  Sex:    Male  Tobacco:   Yes, 0.5 packs per day  Hypertension:   Yes  Hyperlipidemia:  No  Diabetes:   No  Family History:  Yes - parents aged 50-60s    PE/DVT RISK FACTORS:  Sex:    Male  Hormones:   No  Tobacco:   Yes - 0.5 packs per day  Cancer:   No  Travel:   No  Surgery:   No  Other immobilization: No  Personal history:  No  Family history:  No    Allergies:  Codeine    Medications:    Triamterene  Losartan  Hydrochlorothiazide  Amlodipine    Past Medical History:    Hypertension  Psoriasis    Past Surgical History:    History reviewed. No pertinent surgical history.    Family History:    Heart disease    Social History:  Smoking status: Yes, 0.5 packs per day  Alcohol use: Yes, socially  Drug use: No  PCP: [facility]  The patient is an  at [employer].     Review of Systems  "  Constitutional: Negative for chills, diaphoresis and fever.   HENT: Negative for sore throat.    Respiratory: Positive for cough and shortness of breath.    Cardiovascular: Positive for chest pain.   Gastrointestinal: Negative for abdominal pain, diarrhea and nausea.   Neurological: Positive for light-headedness. Negative for dizziness.   All other systems reviewed and are negative.    Physical Exam     Patient Vitals for the past 24 hrs:   BP Temp Temp src Pulse Heart Rate Resp SpO2 Height Weight   03/06/19 2320 (!) 160/91 -- -- -- 82 11 94 % -- --   03/06/19 2144 (!) 143/94 -- -- -- 76 -- 97 % -- --   03/06/19 1959 (!) 152/94 -- -- -- -- -- -- -- --   03/06/19 1957 -- 97.8  F (36.6  C) Temporal 87 -- 20 97 % 1.905 m (6' 3\") (!) 151.5 kg (334 lb)     Physical Exam  Constitutional: Heavyset white male, supine. No respiratory distress.  HENT: No signs of trauma.   Eyes: EOM are normal. Pupils are equal, round, and reactive to light.   Neck: Normal range of motion. No JVD present. No cervical adenopathy.  Cardiovascular: Regular rhythm.  Exam reveals no gallop and no friction rub.    No murmur heard.  Pulmonary/Chest: Expiratory wheezing present. Chest wall is non-tender.   Abdominal: Soft. Small periumbilical hernia, which is tender with palpation. Not incarcerated. 2+ femoral pulses. No rebound or guarding.   Musculoskeletal: No edema. No tenderness.   Lymphadenopathy: No lymphadenopathy.   Neurological: Alert and oriented to person, place, and time. Normal strength. Coordination normal.   Skin: Skin is warm and dry. No rash noted. No erythema.     Emergency Department Course   ECG (19:57:36):  Rate 86 bpm. VA interval 170. QRS duration 84. QT/QTc 400/478. P-R-T axes 56 56 71. Sinus rhythm with frequent premature ventricular complexes. Poor R-wave progression. Interpreted at 2002 by Alexis Huynh MD.    Imaging:  Radiographic findings were communicated with the patient who voiced understanding of the " findings.    XR Chest 2 views:  Normal.  As read by Radiology.    Laboratory:  CBC: WBC 12.8, HGB 15.7,   BMP: Potassium 2.6, Glucose 130, Creatinine 1.17  Troponin (2017): 0.036  Troponin (2138): 0.255    Interventions:  2102: DuoNeb, 2.5mg/3 mL, Inhalation solution, Nebulizer   2133: 40 mEq Potassium chloride PO  2327: 40 mEq Potassium chloride PO    Emergency Department Course:  Past medical records, nursing notes, and vitals reviewed.  2003: I performed an exam of the patient and obtained history, as documented above.  ECG performed, results above.  IV inserted and blood drawn. The patient was placed on continuous cardiac monitoring and pulse oximetry.  The patient was sent for a chest x-ray while in the emergency department, findings above.    2235: I rechecked the patient. Explained findings to the patient.    2311: Findings and plan explained to the patient who consents to admission.     2347: Discussed the patient with Dr. Leon, who will [facility] the [facility] [place] [facility] for further monitoring, evaluation, and treatment.     Impression & Plan    Medical Decision Making:  Edilberto Brooks is a 48 year old male who was sitting at home when he noticed a heaviness in his chest. He felt a little lightheaded as well as some tingling in both arms. He took Aspirin and Ibuprofen and drove himself to the ED. He did call 911 as he wanted to keep in touch with them while he was driving. By the time he got to the ED, he was starting to feel better. He is overweight and does have a history of hypertension. He also has a family history of coronary disease. He also has a cold and states in the past, he has had some episodes where he has had a little chest tightness while he has had a cold. On exam, he is comfortable. He does have some wheezing. His ECG shows a sinus rhythm with occasional premature beats. Chest x-ray is unremarkable. His initial troponin was within normal limits, but at the higher end. A repeat troponin  two hours later was elevated six times as much and was definitely positive. The patient received a neb. He also received oral potassium for hypokalemia. He continues to feel good. He has had the Aspirin as noted. I have discussed the elevation in troponin with him and have recommended he be admitted for a non-STEMI.     Diagnosis:    ICD-10-CM   1. NSTEMI (non-ST elevated myocardial infarction) (H) I21.4   2.      Hypokalemia    Disposition: Admitted to [facility]    Marni Jackson  3/6/2019    EMERGENCY DEPARTMENT    I, Marni Jackson, am serving as a scribe at 8:03 PM on 3/6/2019 to document services personally performed by Alexis Huynh MD based on my observations and the provider's statements to me.      Alexis Huynh MD  03/07/19 0038

## 2019-03-07 NOTE — ED TRIAGE NOTES
Chest pain that started at 1900. Center of the chest between nipples. Describes as chest pressure.

## 2019-03-07 NOTE — PLAN OF CARE
Pt A/Ox4. No c/o pain. Pt in Sr, VSS. NS at 75, on RA. Will continue to monitor.     Antonina Finley RN on 3/7/2019 at 6:38 PM

## 2019-03-07 NOTE — PROGRESS NOTES
"[facility]  Hospitalist Progress Note        Taiwo Pugh MD   03/07/2019        Interval History:      -feels fine, denies any chest pain or shortness of breath, has mild cough         Assessment and Plan:        Edilberto Brooks is a 48 year old male with HTN, smoker who presented to the emergency room with anterior chest pressure.     # Non-ST-elevation myocardial infarction   - Tni 0.036--->0.255---1.771  - EKG with NSR with PVCs  -  No beta-blocker for now due to the bronchospasm  - Continue aspirin  - a1c 6.3,   - ECHO pending  - will start heparin drip, await cardiology eval; might need angiogram  - Keep n.p.o. for possible procedure     # Probable acute bronchospasm   # Tobacco use disorder   - no prior diagnosis of asthma or COPD; likely URI leading to broncospasm improved with nebs  - Chest x-ray was negative for pneumonia.  - Nicotine patch ordered as needed     # Hypertension   - Continue amlodipine and losartan.  - Hold Dyazide due to the hypokalemia     # Hypokalemia  - K 2.6---3.3; supple protocol; Magnesium level was 2.2.     # Hyperlipidemia   - , with concern for likely NSTEMI, will start Lipitor 20 mg po daily     Diet: NPO for Medical/Clinical Reasons Except for: Meds    DVT Prophylaxis: Heparin drip for likely NSTEMI  Code Status: Full      Expected discharge:  likely 1 to 2 days pending cardiology evaluation                      Physical Exam:      Blood pressure 128/79, pulse 78, temperature 98.1  F (36.7  C), temperature source Oral, resp. rate 16, height 1.905 m (6' 3\"), weight 147.9 kg (326 lb), SpO2 94 %.  Vitals:    03/06/19 1957 03/07/19 0054   Weight: (!) 151.5 kg (334 lb) 147.9 kg (326 lb)     Vital Signs with Ranges  Temp:  [97.8  F (36.6  C)-98.1  F (36.7  C)] 98.1  F (36.7  C)  Pulse:  [78-87] 78  Heart Rate:  [70-82] 70  Resp:  [11-20] 16  BP: (120-160)/(76-94) 128/79  SpO2:  [94 %-97 %] 94 %  I/O's Last 24 hours  I/O last 3 completed shifts:  In: " 50 [P.O.:50]  Out: -     Constitutional: Alert, awake and oriented X 3; lying comfortably in bed in no apparent distress   HEENT: Pupils equal and reactive to light and accomodation, EOMI intact; neck supple no raised JVD or rigidity    Oral cavity: Moist mucosa   Cardiovascular: Normal s1 s2, regular rate and rhythm, no murmur   Lungs: B/l clear to auscultation, no wheezes or crepitations   Abdomen: Soft, nt, nd, no guarding, rigidity or rebound; BS +   LE : No edema; psoriatic plaque noted on RLE   Musculoskeletal: Power 5/5 in all extremities   Neuro: No focal neurological deficits noted, CN II to XII grossly intact   Psychiatry: normal mood and affect                Medications:          amLODIPine  5 mg Oral Daily     aspirin  325 mg Oral Daily     enoxaparin  40 mg Subcutaneous Q24H     losartan  25 mg Oral Daily     nicotine   Transdermal Q8H     [START ON 3/8/2019] nicotine   Transdermal Daily     senna-docusate  1 tablet Oral BID    Or     senna-docusate  2 tablet Oral BID     PRN Meds: acetaminophen, bisacodyl, ipratropium - albuterol 0.5 mg/2.5 mg/3 mL, magnesium hydroxide, magnesium sulfate, magnesium sulfate, melatonin, metoclopramide **OR** metoclopramide, naloxone, nicotine, ondansetron **OR** ondansetron, potassium chloride, potassium chloride with lidocaine, potassium chloride, potassium chloride, potassium chloride, prochlorperazine **OR** prochlorperazine **OR** prochlorperazine, senna-docusate **OR** senna-docusate         Data:      All new lab and imaging data was reviewed.   Recent Labs   Lab Test 03/07/19 0539 03/06/19 2017   WBC  --  12.8*   HGB  --  15.7   MCV  --  90    361      Recent Labs   Lab Test 03/07/19 0539 03/06/19 2017    135   POTASSIUM 3.3* 2.6*   CHLORIDE 99 97   CO2 31 28   BUN 17 19   CR 1.17 1.17   ANIONGAP 7 10   KAREN 8.7 8.6   GLC 97 130*     Recent Labs   Lab Test 03/07/19 0539 03/06/19  2138 03/06/19 2017   TROPI 1.771* 0.255* 0.036

## 2019-03-07 NOTE — CONSULTS
"Consult Date:  03/07/2019      CARDIOLOGY CONSULTATION      PATIENT HISTORY:  Mr. Edilberto Brooks is 48 years old and is seen for chest discomfort and an abnormal troponin level.  Cardiology consultation has been requested by Dr. Don Leon of the Hospitalist Service.      Mr. Brooks has no prior history of heart disease.  He has been very well, having a medical history significant only for psoriasis.  Last evening, he sat down to watch television and noted a \"knot\" in his epigastrium.  He said it was not painful, but he knew it was \"not right.\"  He walked around, did not feel better and took 3 Advil, but after 5-10 minutes, elected to proceed to the Emergency Department as he was suspicious that the Advil would not help.  He was seen in the Emergency Department and his EKG demonstrated normal sinus rhythm with septal Q-waves.  He became discomfort free and has remained free of discomfort in the hospital.  His troponin has risen to 2.7.      He was not short of breath.  He denies any prior exertional intolerance, dyspnea or chest discomfort.  He had no radiation of the discomfort or associated diaphoresis.      In the Emergency Department, his blood pressure was initially 152/94 mmHg.  He notes that both parents have had coronary artery disease, though at a later age in their 70s years of age.  His father has undergone coronary intervention and stent placement and has done well.  He has had a history of dyslipidemia, but notes that his cholesterol numbers have been falling.  In the hospital, his LDL fraction was 152 mg/dL and his HDL fraction was low.      In the emergency room, he did note some dyspnea and bilateral arm tingling and some lightheadedness, but that apparently resolved, as it was not prominent with his description of the symptoms.  He has had a cough over the last several weeks, but that has also resolved.  He smokes of approximately the one-half pack of cigarettes daily.      FAMILY HISTORY:  " Significant for coronary disease in both parents.      SOCIAL HISTORY:  He is employed.  He does smoke a half pack daily of cigarettes.  He works as an  and drinks alcohol socially.      PAST MEDICAL HISTORY:   1.  Hypertension.   2.  Psoriasis.      Medications on admission include triamterene, losartan, hydrochlorothiazide and amlodipine at the doses listed in her Epic chart.      REVIEW OF SYSTEMS:  The 10-point review of systems is negative except as noted above.      PHYSICAL EXAMINATION:   GENERAL:  This is a man lying comfortably in bed.  He was alert and oriented to person, place and time.   VITAL SIGNS:  The blood pressure was 128/79 mmHg, heart rate 78 beats per minute and regular, respiratory rate 14-18 per minute.   HEAD:  Normocephalic.  The eyes were nonicteric.     SKIN:  Warm and dry.     NECK:  Free of thyromegaly.  The carotid upstrokes were normal without bruits.   CHEST:  Clear to auscultation.   CARDIAC:  On cardiac auscultation, there was an S1 and S2 without extra sounds or a murmur.   EXTREMITIES:  There is no peripheral pedal edema.   NEUROLOGIC:  Facies were symmetric and he moved all extremities without focal limitation.      LABORATORY DATA:  EKG was as described.  The white blood cell count was 12.8, hemoglobin 15.7 and platelet count 361,000.  The sodium was 137, potassium was initially 2.6 and is now 3.3, BUN 17 and creatinine 1.17.      ASSESSMENT AND RECOMMENDATIONS:  This is an individual with risk factors for heart disease, including tobacco, family history from both parents, dyslipidemia and hypertension.  His hemoglobin A1c is mildly elevated as well.  He had the relatively acute onset of a new symptom and it is associated with a troponin rise from 0.036 to the current 1.77.  As such, coronary angiography has been recommended.  The risks, benefits and alternatives to therapy including the use of dual antiplatelet therapy and their inherent risks and benefits were  discussed.  Mr. Delgado is well aware of the procedure of coronary angiography and intervention given his father's history and he notes that his father is doing well.  He has agreed to proceed.  He denies any abnormal bleeding.  He denies any allergy to dye.      Low-dose aspirin, statin therapy, beta blockade continued and an ARB agent will be used.  He has hypokalemia and would likely benefit from discontinuation of a diuretic or substitution of a potassium-sparing diuretic.  Aggressive risk factor control will be of benefit.      His symptoms are not strongly suggestive of pulmonary embolism.  A D-dimer has been added, but once his history was reviewed, it became clear that it is necessary to evaluate the coronary anatomy.  Further recommendations will follow based on the outcome of the recommended evaluation and therapies.         DAMON MCDONOUGH MD       D: 2019   T: 2019   MT: CLARE      Name:     SALOMON DELGADO   MRN:      [id number]        Account:       [id number]   :      1970           Consult Date:  2019      Document: [id number]       cc: [facility]

## 2019-03-08 ENCOUNTER — APPOINTMENT (OUTPATIENT)
Dept: ULTRASOUND IMAGING | Facility: CLINIC | Age: 49
DRG: 234 | End: 2019-03-08
Attending: HOSPITALIST
Payer: COMMERCIAL

## 2019-03-08 ENCOUNTER — APPOINTMENT (OUTPATIENT)
Dept: CARDIOLOGY | Facility: CLINIC | Age: 49
DRG: 234 | End: 2019-03-08
Attending: HOSPITALIST
Payer: COMMERCIAL

## 2019-03-08 LAB
ABO + RH BLD: NORMAL
ABO + RH BLD: NORMAL
ALBUMIN UR-MCNC: 30 MG/DL
APPEARANCE UR: CLEAR
BILIRUB UR QL STRIP: NEGATIVE
BLD GP AB SCN SERPL QL: NORMAL
BLOOD BANK CMNT PATIENT-IMP: NORMAL
BLOOD BANK CMNT PATIENT-IMP: NORMAL
CO2 BLDCOV-SCNC: 24 MMOL/L (ref 21–28)
COLOR UR AUTO: YELLOW
D DIMER PPP FEU-MCNC: 0.5 UG/ML FEU (ref 0–0.5)
GLUCOSE UR STRIP-MCNC: NEGATIVE MG/DL
HGB UR QL STRIP: NEGATIVE
KETONES UR STRIP-MCNC: NEGATIVE MG/DL
LEUKOCYTE ESTERASE UR QL STRIP: NEGATIVE
LMWH PPP CHRO-ACNC: 0.18 IU/ML
MUCOUS THREADS #/AREA URNS LPF: PRESENT /LPF
NITRATE UR QL: NEGATIVE
PCO2 BLDV: 38 MM HG (ref 40–50)
PH BLDV: 7.4 PH (ref 7.32–7.43)
PH UR STRIP: 6 PH (ref 5–7)
PO2 BLDV: 32 MM HG (ref 25–47)
POTASSIUM SERPL-SCNC: 3.4 MMOL/L (ref 3.4–5.3)
RBC #/AREA URNS AUTO: 1 /HPF (ref 0–2)
SAO2 % BLDV FROM PO2: 61 %
SOURCE: ABNORMAL
SP GR UR STRIP: 1.03 (ref 1–1.03)
SPECIMEN EXP DATE BLD: NORMAL
SQUAMOUS #/AREA URNS AUTO: <1 /HPF (ref 0–1)
UROBILINOGEN UR STRIP-MCNC: NORMAL MG/DL (ref 0–2)
WBC #/AREA URNS AUTO: 1 /HPF (ref 0–5)

## 2019-03-08 PROCEDURE — 87640 STAPH A DNA AMP PROBE: CPT | Performed by: THORACIC SURGERY (CARDIOTHORACIC VASCULAR SURGERY)

## 2019-03-08 PROCEDURE — 99233 SBSQ HOSP IP/OBS HIGH 50: CPT | Mod: 25 | Performed by: INTERNAL MEDICINE

## 2019-03-08 PROCEDURE — 85379 FIBRIN DEGRADATION QUANT: CPT | Performed by: INTERNAL MEDICINE

## 2019-03-08 PROCEDURE — 93306 TTE W/DOPPLER COMPLETE: CPT | Mod: 26 | Performed by: INTERNAL MEDICINE

## 2019-03-08 PROCEDURE — 21000001 ZZH R&B HEART CARE

## 2019-03-08 PROCEDURE — 25000132 ZZH RX MED GY IP 250 OP 250 PS 637: Performed by: NURSE PRACTITIONER

## 2019-03-08 PROCEDURE — 81001 URINALYSIS AUTO W/SCOPE: CPT | Performed by: SURGERY

## 2019-03-08 PROCEDURE — 40000264 ECHOCARDIOGRAM COMPLETE

## 2019-03-08 PROCEDURE — 25500064 ZZH RX 255 OP 636: Performed by: HOSPITALIST

## 2019-03-08 PROCEDURE — 25000132 ZZH RX MED GY IP 250 OP 250 PS 637: Performed by: INTERNAL MEDICINE

## 2019-03-08 PROCEDURE — 93880 EXTRACRANIAL BILAT STUDY: CPT

## 2019-03-08 PROCEDURE — 84132 ASSAY OF SERUM POTASSIUM: CPT | Performed by: INTERNAL MEDICINE

## 2019-03-08 PROCEDURE — 25000132 ZZH RX MED GY IP 250 OP 250 PS 637: Performed by: HOSPITALIST

## 2019-03-08 PROCEDURE — 99233 SBSQ HOSP IP/OBS HIGH 50: CPT | Performed by: HOSPITALIST

## 2019-03-08 PROCEDURE — 25000128 H RX IP 250 OP 636: Performed by: HOSPITALIST

## 2019-03-08 PROCEDURE — 36415 COLL VENOUS BLD VENIPUNCTURE: CPT | Performed by: INTERNAL MEDICINE

## 2019-03-08 PROCEDURE — 93970 EXTREMITY STUDY: CPT

## 2019-03-08 PROCEDURE — 85520 HEPARIN ASSAY: CPT | Performed by: INTERNAL MEDICINE

## 2019-03-08 PROCEDURE — 87641 MR-STAPH DNA AMP PROBE: CPT | Performed by: THORACIC SURGERY (CARDIOTHORACIC VASCULAR SURGERY)

## 2019-03-08 PROCEDURE — 99207 ZZC CDG-MDM COMPONENT: MEETS MODERATE - UP CODED: CPT | Performed by: HOSPITALIST

## 2019-03-08 RX ORDER — METOPROLOL TARTRATE 25 MG/1
25 TABLET, FILM COATED ORAL
Status: CANCELLED | OUTPATIENT
Start: 2019-03-08

## 2019-03-08 RX ORDER — ATORVASTATIN CALCIUM 40 MG/1
80 TABLET, FILM COATED ORAL EVERY EVENING
Status: DISCONTINUED | OUTPATIENT
Start: 2019-03-08 | End: 2019-03-12

## 2019-03-08 RX ORDER — LEVOTHYROXINE SODIUM 50 UG/1
50 TABLET ORAL DAILY
Status: DISCONTINUED | OUTPATIENT
Start: 2019-03-09 | End: 2019-03-18 | Stop reason: HOSPADM

## 2019-03-08 RX ADMIN — LOSARTAN POTASSIUM 25 MG: 25 TABLET ORAL at 09:19

## 2019-03-08 RX ADMIN — ATORVASTATIN CALCIUM 80 MG: 80 TABLET, FILM COATED ORAL at 20:46

## 2019-03-08 RX ADMIN — AMLODIPINE BESYLATE 5 MG: 5 TABLET ORAL at 09:19

## 2019-03-08 RX ADMIN — HUMAN ALBUMIN MICROSPHERES AND PERFLUTREN 7 ML: 10; .22 INJECTION, SOLUTION INTRAVENOUS at 08:34

## 2019-03-08 RX ADMIN — HEPARIN SODIUM 1300 UNITS/HR: 10000 INJECTION, SOLUTION INTRAVENOUS at 17:58

## 2019-03-08 RX ADMIN — METOPROLOL SUCCINATE 25 MG: 25 TABLET, EXTENDED RELEASE ORAL at 09:20

## 2019-03-08 RX ADMIN — HEPARIN SODIUM 1300 UNITS/HR: 10000 INJECTION, SOLUTION INTRAVENOUS at 00:07

## 2019-03-08 RX ADMIN — ASPIRIN 81 MG: 81 TABLET, COATED ORAL at 09:20

## 2019-03-08 RX ADMIN — POTASSIUM CHLORIDE 20 MEQ: 1500 TABLET, EXTENDED RELEASE ORAL at 06:35

## 2019-03-08 ASSESSMENT — ACTIVITIES OF DAILY LIVING (ADL)
ADLS_ACUITY_SCORE: 12

## 2019-03-08 ASSESSMENT — MIFFLIN-ST. JEOR: SCORE: 2439.81

## 2019-03-08 NOTE — CONSULTS
CARDIOTHORACIC SURGERY CONSULTATION  3/8/2019       Date of Admission:  3/6/2019  Reason for Consultation: We were asked to evaluate Edilberto Brooks for coronary artery bypass surgery. The patient was seen and evaluated.  Attending: Dr. Dex Vincent    HPI: Edilberto Brooks is a 48 year old male with significant history of hypertension and morbid obesity who was presented to the hospital on 3/6/2019 with chest pain. He had an elevation of his troponins and was admitted for further evaluation and management. This included coronary angiography, which was performed yesterday and showed severe multivessel disease.     The patient denies any prior episodes of chest pain. He has not had any other episodes of pain since admission and is resting comfortably at the time of this evaluation.     ROS:   The Review of Systems is negative other than noted in the HPI.    Past Medical History:  Past Medical History:   Diagnosis Date     Hypercholesteremia      Hypertension      Obesity      Tobacco use disorder, continuous        Past Surgical History:   Past Surgical History:   Procedure Laterality Date     CV HEART CATHETERIZATION WITH POSSIBLE INTERVENTION N/A 3/7/2019    Procedure: Heart Catheterization with possible Intervention;  Surgeon: Drew Logan MD;  Location: [facility] CARDIAC CATH LAB     FRACTURE SURGERY  1997    left leg        Medications:     No current facility-administered medications on file prior to encounter.   Current Outpatient Medications on File Prior to Encounter:  acetaminophen (TYLENOL) 325 MG tablet Take 325-650 mg by mouth every 6 hours as needed for mild pain   amLODIPine (NORVASC) 10 MG tablet Take 10 mg by mouth daily   aspirin (ASA) 325 MG EC tablet Take 325 mg by mouth daily as needed for moderate pain    ibuprofen (ADVIL/MOTRIN) 200 MG tablet Take 200-400 mg by mouth every 4 hours as needed for mild pain   levothyroxine (SYNTHROID/LEVOTHROID) 50 MCG tablet Take 50 mcg by mouth daily   losartan  (COZAAR) 25 MG tablet Take 25 mg by mouth daily   triamterene-HCTZ (MAXZIDE) 75-50 MG tablet Take 1 tablet by mouth every morning        Allergies:   Allergies   Allergen Reactions     Codeine      Hypotension/ Dizziness          Social History:   Social History     Socioeconomic History     Marital status:      Spouse name: Not on file     Number of children: Not on file     Years of education: Not on file     Highest education level: Not on file   Occupational History     Occupation:    Social Needs     Financial resource strain: Not on file     Food insecurity:     Worry: Not on file     Inability: Not on file     Transportation needs:     Medical: Not on file     Non-medical: Not on file   Tobacco Use     Smoking status: Current Every Day Smoker     Packs/day: 0.50     Years: 17.00     Pack years: 8.50     Smokeless tobacco: Never Used   Substance and Sexual Activity     Alcohol use: Yes     Comment: social      Drug use: No     Sexual activity: Yes     Partners: Female   Lifestyle     Physical activity:     Days per week: Not on file     Minutes per session: Not on file     Stress: Not on file   Relationships     Social connections:     Talks on phone: Not on file     Gets together: Not on file     Attends [employer] service: Not on file     Active member of club or organization: Not on file     Attends meetings of clubs or organizations: Not on file     Relationship status: Not on file     Intimate partner violence:     Fear of current or ex partner: Not on file     Emotionally abused: Not on file     Physically abused: Not on file     Forced sexual activity: Not on file   Other Topics Concern     Not on file   Social History Narrative     Not on file       Family History:   Family History   Problem Relation Age of Onset     Heart Disease Mother         MI in 60's     Breast Cancer Mother      Pacemaker Father      Colon Cancer Father 70     No Known Problems Sister      Diabetes No family hx of  "       Exam:  /74 (BP Location: Right arm)   Pulse 64   Temp 98.2  F (36.8  C) (Oral)   Resp 16   Ht 1.905 m (6' 3\")   Wt 147.9 kg (326 lb 1.6 oz)   SpO2 94%   BMI 40.76 kg/m    General: NAD, following commands  HEENT: pupils equal and reactive  CV: RRR, no added sounds  Pulm: Non labored breathing, CTAB  Abd: soft, non distended, non tender, obese  Ext: Non tender, peripheral pulses palpable  Neuro: A&O x3, CN 2-12 intact, motor and sensation intact     Labs: Reviewed in full. Notable labs include WBC 8.8, Hgb 14, hematocrit 41.5, Cr 1.17, hgbA1c 6.3    Imaging: Reviewed.   US Carotid Bilateral 3/8/2019  IMPRESSION:    1. Less than 50% diameter stenosis of the right ICA relative to the  distal ICA diameter.   2. Less than 50% diameter stenosis of the left ICA relative to the  distal ICA diameter.     US Lower Extremity Venous Mapping Bilateral 3/8/2019  IMPRESSION:   1. Right great saphenous vein in the thigh ranges from 4.6 mm to 5.8  mm.  2. Right great saphenous vein at and below the knee ranges from 2.7 mm  to 3.8 mm.  3. Left great saphenous vein in the thigh ranges from 2.8 mm to 5.6  mm.  4. Left great saphenous vein at and below the knee ranges from 3.2 mm  to 3.5 mm.    Echo Complete with Contrast 3/8/2019  Interpretation Summary     Technically difficult study.  The visual ejection fraction is estimated at 55-60%.  Normal left ventricular wall motion  Dilated inferior vena cava  Borderline/mild aortic root dilatation.    Cardiac Catheterization 3/7/2019  SUMMARY OF CORONARY ANGIOGRAM:  1) Normal left main  2) LAD is a large vessel that has 90% ostial disease and 80% tubular  disease. Mid and distal LAD are good targets.  LAD gives rise to D1  with 80% disease and good distal target  3) Circumflex is large and dominant. Gives rise to OM1 that has 70%  ostial followed by 60% discrete lesion and no significant disease  distally. OM2 without significant disease. L-PDA with 99% disease and  L-L " collaterals. There is a good distal target.   4) RCA is occluded at the ostium with L-R collaterals. It is small in  size.    Assessment: Edilberto Brooks is a 48 year old male with significant history of hypertension and obesity who presented with chst pain and has been found to have severe multivessel coronary artery disease. Echocardiography shows a normal ejection fraction. We had a discussion with the patient regarding the nature of his problem, the options for treatment, the rationale for surgery, and the risks and benefits of surgical intervention. He has agreed to proceed with surgery.     Recommendations:  - Plan for coronary artery bypass grafting on Tuesday, March 12  - Other cares per primary service    The patient was discussed with Dex Vincent MD, surgery staff, who agrees with the plan.    Clarence Carballo MD  Fellow, CT Surgery

## 2019-03-08 NOTE — PROGRESS NOTES
"[facility]  Hospitalist Progress Note        Taiwo Pugh MD   03/08/2019        Interval History:      - s/p cardiac cath on 3/7/19; CV surgery consulted; feels fine, denies any chest pain or shortness of breath         Assessment and Plan:        Edliberto Brooks is a 48 year old male with HTN, smoker who presented to the emergency room with anterior chest pressure.     # Non-ST-elevation myocardial infarction, cath 3/7 with multivessel disease  # HTN  # Hyperlipidemia    - Tni 0.036--->0.255---1.771  - EKG with NSR with PVCs  - evaluated by cardiology and underwent cardiac cath on 3/7/19 which showed multivessel disease; CV surgery consulted, carotid US (no significant stenoses) and vein mapping done  - started on ASA, Lipitor, Toprol XL; continue PTA amlodipine and losartan  - holding PTA Maxzide  - a1c 6.3,   - ECHO with EF 55-60 %  - continue heparin drip    # Probable acute bronchospasm   # Tobacco use disorder   - no prior diagnosis of asthma or COPD; likely URI leading to broncospasm improved with nebs  - Chest x-ray was negative for pneumonia.  - Nicotine patch ordered as needed     # Hypothyroidism  - resume PTA synthroid     # Hypokalemia  - K 2.6---3.3---3.4; supple protocol; Magnesium level was 2.2.        DVT Prophylaxis: Heparin drip for NSTEMI  Code Status: Full      Expected discharge: likely several days pending CV surgery evaluation and plans for bypass                     Physical Exam:      Blood pressure 123/81, pulse 64, temperature 98.2  F (36.8  C), temperature source Oral, resp. rate 16, height 1.905 m (6' 3\"), weight 147.9 kg (326 lb 1.6 oz), SpO2 95 %.  Vitals:    03/06/19 1957 03/07/19 0054 03/08/19 0638   Weight: (!) 151.5 kg (334 lb) 147.9 kg (326 lb) 147.9 kg (326 lb 1.6 oz)     Vital Signs with Ranges  Temp:  [98.2  F (36.8  C)-98.5  F (36.9  C)] 98.2  F (36.8  C)  Pulse:  [64-71] 64  Heart Rate:  [58-84] 79  Resp:  [16-18] 16  BP: (109-151)/(54-92) " 123/81  SpO2:  [92 %-95 %] 95 %  I/O's Last 24 hours  No intake/output data recorded.    Constitutional: Alert, awake and oriented X 3; lying comfortably in bed in no apparent distress   HEENT: Pupils equal and reactive to light and accomodation, EOMI intact; neck supple no raised JVD or rigidity    Oral cavity: Moist mucosa   Cardiovascular: Normal s1 s2, regular rate and rhythm, no murmur   Lungs: B/l clear to auscultation, no wheezes or crepitations   Abdomen: Soft, nt, nd, no guarding, rigidity or rebound; BS +   LE : No edema; psoriatic plaque noted on RLE   Musculoskeletal: Power 5/5 in all extremities   Neuro: No focal neurological deficits noted, CN II to XII grossly intact   Psychiatry: normal mood and affect                Medications:          amLODIPine  5 mg Oral Daily     aspirin  81 mg Oral Daily     atorvastatin  80 mg Oral QPM     losartan  25 mg Oral Daily     metoprolol succinate ER  25 mg Oral Daily     nicotine   Transdermal Q8H     nicotine   Transdermal Daily     senna-docusate  1 tablet Oral BID    Or     senna-docusate  2 tablet Oral BID     sodium chloride (PF)  3 mL Intracatheter Q8H     sodium chloride (PF)  3 mL Intracatheter Q8H     PRN Meds: acetaminophen, acetaminophen, bisacodyl, guaiFENesin, HOLD MEDICATION, HYDROcodone-acetaminophen, ipratropium - albuterol 0.5 mg/2.5 mg/3 mL, lidocaine 4%, lidocaine (buffered or not buffered), LORazepam **OR** LORazepam, magnesium hydroxide, magnesium sulfate, magnesium sulfate, melatonin, metoclopramide **OR** metoclopramide, naloxone, nicotine, ondansetron **OR** ondansetron, - MEDICATION INSTRUCTIONS -, potassium chloride, potassium chloride with lidocaine, potassium chloride, potassium chloride, potassium chloride, potassium chloride, prochlorperazine **OR** prochlorperazine **OR** prochlorperazine, senna-docusate **OR** senna-docusate, sodium chloride (PF), sodium chloride (PF)         Data:      All new lab and imaging data was reviewed.    Recent Labs   Lab Test 03/07/19 2112 03/07/19 0539 03/06/19 2017   WBC 8.8  --  12.8*   HGB 14.0  --  15.7   MCV 92  --  90    315 361      Recent Labs   Lab Test 03/08/19 0533 03/07/19  1306 03/07/19 0539 03/06/19 2017   NA  --   --  137 135   POTASSIUM 3.4 3.4 3.3* 2.6*   CHLORIDE  --   --  99 97   CO2  --   --  31 28   BUN  --   --  17 19   CR  --   --  1.17 1.17   ANIONGAP  --   --  7 10   KAREN  --   --  8.7 8.6   GLC  --   --  97 130*     Recent Labs   Lab Test 03/07/19 0539 03/06/19 2138 03/06/19 2017   TROPI 1.771* 0.255* 0.036

## 2019-03-08 NOTE — CONSULTS
CARDIOTHORACIC SURGERY CONSULTATION  3/8/2019       Date of Admission:  3/6/2019  Reason for Consultation: We were asked to evaluate Edilberto Brooks for coronary artery bypass surgery. The patient was seen and evaluated.  Attending: Dr. Dex Vincent    HPI: Edilberto Brooks is a 48 year old male with significant history of hypertension and morbid obesity who was presented to the hospital on 3/6/2019 with chest pain. He had an elevation of his troponins and was admitted for further evaluation and management. This included coronary angiography, which was performed yesterday and showed severe multivessel disease.     The patient denies any prior episodes of chest pain. He has not had any other episodes of pain since admission and is resting comfortably at the time of this evaluation.     ROS:   The Review of Systems is negative other than noted in the HPI.    Past Medical History:  Past Medical History:   Diagnosis Date     Hypercholesteremia      Hypertension      Obesity      Tobacco use disorder, continuous        Past Surgical History:   Past Surgical History:   Procedure Laterality Date     CV HEART CATHETERIZATION WITH POSSIBLE INTERVENTION N/A 3/7/2019    Procedure: Heart Catheterization with possible Intervention;  Surgeon: Drew Logan MD;  Location: [facility] CARDIAC CATH LAB     FRACTURE SURGERY  1997    left leg        Medications:     No current facility-administered medications on file prior to encounter.   Current Outpatient Medications on File Prior to Encounter:  acetaminophen (TYLENOL) 325 MG tablet Take 325-650 mg by mouth every 6 hours as needed for mild pain   amLODIPine (NORVASC) 10 MG tablet Take 10 mg by mouth daily   aspirin (ASA) 325 MG EC tablet Take 325 mg by mouth daily as needed for moderate pain    ibuprofen (ADVIL/MOTRIN) 200 MG tablet Take 200-400 mg by mouth every 4 hours as needed for mild pain   levothyroxine (SYNTHROID/LEVOTHROID) 50 MCG tablet Take 50 mcg by mouth daily   losartan  (COZAAR) 25 MG tablet Take 25 mg by mouth daily   triamterene-HCTZ (MAXZIDE) 75-50 MG tablet Take 1 tablet by mouth every morning        Allergies:   Allergies   Allergen Reactions     Codeine      Hypotension/ Dizziness          Social History:   Social History     Socioeconomic History     Marital status:      Spouse name: Not on file     Number of children: Not on file     Years of education: Not on file     Highest education level: Not on file   Occupational History     Occupation:    Social Needs     Financial resource strain: Not on file     Food insecurity:     Worry: Not on file     Inability: Not on file     Transportation needs:     Medical: Not on file     Non-medical: Not on file   Tobacco Use     Smoking status: Current Every Day Smoker     Packs/day: 0.50     Years: 17.00     Pack years: 8.50     Smokeless tobacco: Never Used   Substance and Sexual Activity     Alcohol use: Yes     Comment: social      Drug use: No     Sexual activity: Yes     Partners: Female   Lifestyle     Physical activity:     Days per week: Not on file     Minutes per session: Not on file     Stress: Not on file   Relationships     Social connections:     Talks on phone: Not on file     Gets together: Not on file     Attends Alevism service: Not on file     Active member of club or organization: Not on file     Attends meetings of clubs or organizations: Not on file     Relationship status: Not on file     Intimate partner violence:     Fear of current or ex partner: Not on file     Emotionally abused: Not on file     Physically abused: Not on file     Forced sexual activity: Not on file   Other Topics Concern     Not on file   Social History Narrative     Not on file       Family History:   Family History   Problem Relation Age of Onset     Heart Disease Mother         MI in 60's     Breast Cancer Mother      Pacemaker Father      Colon Cancer Father 70     No Known Problems Sister      Diabetes No family hx of  "       Exam:  /81 (BP Location: Left arm)   Pulse 64   Temp 98.2  F (36.8  C) (Oral)   Resp 16   Ht 1.905 m (6' 3\")   Wt 147.9 kg (326 lb 1.6 oz)   SpO2 95%   BMI 40.76 kg/m    General: NAD, following commands  HEENT: pupils equal and reactive  CV: RRR, no added sounds  Pulm: Non labored breathing, CTAB  Abd: soft, non distended, non tender, obese  Ext: Non tender, peripheral pulses palpable  Neuro: A&O x3, CN 2-12 intact, motor and sensation intact     Labs: Reviewed in full. Notable labs include WBC 8.8, Hgb 14, hematocrit 41.5, Cr 1.17, hgbA1c 6.3    Imaging: Reviewed.   US Carotid Bilateral 3/8/2019  IMPRESSION:    1. Less than 50% diameter stenosis of the right ICA relative to the  distal ICA diameter.   2. Less than 50% diameter stenosis of the left ICA relative to the  distal ICA diameter.     US Lower Extremity Venous Mapping Bilateral 3/8/2019  IMPRESSION:   1. Right great saphenous vein in the thigh ranges from 4.6 mm to 5.8  mm.  2. Right great saphenous vein at and below the knee ranges from 2.7 mm  to 3.8 mm.  3. Left great saphenous vein in the thigh ranges from 2.8 mm to 5.6  mm.  4. Left great saphenous vein at and below the knee ranges from 3.2 mm  to 3.5 mm.    Echo Complete with Contrast 3/8/2019  Interpretation Summary     Technically difficult study.  The visual ejection fraction is estimated at 55-60%.  Normal left ventricular wall motion  Dilated inferior vena cava  Borderline/mild aortic root dilatation.    Cardiac Catheterization 3/7/2019  SUMMARY OF CORONARY ANGIOGRAM:  1) Normal left main  2) LAD is a large vessel that has 90% ostial disease and 80% tubular  disease. Mid and distal LAD are good targets.  LAD gives rise to D1  with 80% disease and good distal target  3) Circumflex is large and dominant. Gives rise to OM1 that has 70%  ostial followed by 60% discrete lesion and no significant disease  distally. OM2 without significant disease. L-PDA with 99% disease and  L-L " collaterals. There is a good distal target.   4) RCA is occluded at the ostium with L-R collaterals. It is small in  size.    Assessment: Edilberto Brooks is a 48 year old male with significant history of hypertension and obesity who presented with chst pain and has been found to have severe multivessel coronary artery disease. Echocardiography shows a normal ejection fraction. We had a discussion with the patient regarding the nature of his problem, the options for treatment, the rationale for surgery, and the risks and benefits of surgical intervention. He has agreed to proceed with surgery.     Recommendations:  - Plan for coronary artery bypass grafting on Tuesday, March 12  - Other cares per primary service    The patient was discussed with Dex Vincent MD, surgery staff, who agrees with the plan.    Clarence Carballo MD  Fellow, CT Surgery

## 2019-03-08 NOTE — PLAN OF CARE
Pt A/Ox4. No c/o pain, VSS, on Ra. Calls appropriately.    Antonina Finley RN on 3/7/2019 at 6:19 PM

## 2019-03-08 NOTE — PROGRESS NOTES
[facility]    Cardiology Progress Note    Date of Service (when I saw the patient): 03/08/2019     Assessment & Plan   Edilberto Brooks is a 48 year old male who was admitted on 3/6/2019 with chest pain and abnml troponin of 1.8. Septal Q waves on EKG and hypertensive on admit with .94 and hyperlipidemia with . Hx of obesity and family hx of CAD, smoked remotely.  at     NSTEMI  -severe three vessel disease  -surgery consulted for 4 vessel cabg   -EF 55-60%  -borderline/mild aortic root dilatation 4.0 cm  -lipitor increased to 80  -on heparin  Plan for surgery next week    Hypertension  -controlled on ARB and BB and CCB    Hyperlipidema  -lipitor 80    Interval History   Cath yesterday, significant disease. No futher CP or SOB, resting comfortably in bed on RA    Physical Exam   Temp: 98.2  F (36.8  C) Temp src: Oral BP: 123/81 Pulse: 64 Heart Rate: 79 Resp: 16 SpO2: 95 % O2 Device: None (Room air)    Vitals:    03/06/19 1957 03/07/19 0054 03/08/19 0638   Weight: (!) 151.5 kg (334 lb) 147.9 kg (326 lb) 147.9 kg (326 lb 1.6 oz)     Vital Signs with Ranges  Temp:  [98.2  F (36.8  C)] 98.2  F (36.8  C)  Pulse:  [64-70] 64  Heart Rate:  [58-84] 79  Resp:  [16-18] 16  BP: (109-151)/(54-92) 123/81  SpO2:  [94 %-95 %] 95 %  I/O last 3 completed shifts:  In: 350 [P.O.:350]  Out: 300 [Urine:300]    Constitutional     alert and oriented, in no acute distress.     Skin     warm and dry to touch    ENT     no pallor or cyanosis    Neck    Supple, JVP normal    Chest     no tenderness to palpation    Lungs  clear to auscultation     Cardiac  regular rhythm, S1 normal, S2 normal  Abdomen     abdomen soft, obese    Extremities and Back     No edema observed. Radial site looks good       Neurological     no gross motor deficits noted, affect appropriate, oriented to time, person and place.    Medications     HEParin 1,300 Units/hr (03/08/19 1015)     - MEDICATION INSTRUCTIONS -       sodium  chloride       sodium chloride 75 mL/hr at 19 1505       amLODIPine  5 mg Oral Daily     aspirin  81 mg Oral Daily     atorvastatin  80 mg Oral QPM     [START ON 3/9/2019] levothyroxine  50 mcg Oral Daily     losartan  25 mg Oral Daily     metoprolol succinate ER  25 mg Oral Daily     nicotine   Transdermal Q8H     nicotine   Transdermal Daily     senna-docusate  1 tablet Oral BID    Or     senna-docusate  2 tablet Oral BID     sodium chloride (PF)  3 mL Intracatheter Q8H     sodium chloride (PF)  3 mL Intracatheter Q8H       Data   Results for orders placed or performed during the hospital encounter of 19 (from the past 24 hour(s))   Heparin Xa level (AM Draw)   Result Value Ref Range    Heparin 10A Level 0.24 IU/mL   ABO/Rh type and screen   Result Value Ref Range    ABO A     RH(D) Neg     Antibody Screen Neg     Test Valid Only At [facility]        Specimen Expires 03/10/2019     Blood Bank Comment PREADMIT ORDER RECEIVED IN BLOOD BANK    EKG 12-lead, tracing only   Result Value Ref Range    Interpretation ECG Click View Image link to view waveform and result    CBC with platelets   Result Value Ref Range    WBC 8.8 4.0 - 11.0 10e9/L    RBC Count 4.52 4.4 - 5.9 10e12/L    Hemoglobin 14.0 13.3 - 17.7 g/dL    Hematocrit 41.5 40.0 - 53.0 %    MCV 92 78 - 100 fl    MCH 31.0 26.5 - 33.0 pg    MCHC 33.7 31.5 - 36.5 g/dL    RDW 13.8 10.0 - 15.0 %    Platelet Count 288 150 - 450 10e9/L   D dimer quantitative   Result Value Ref Range    D Dimer 0.5 0.0 - 0.50 ug/ml FEU   Potassium   Result Value Ref Range    Potassium 3.4 3.4 - 5.3 mmol/L   Heparin Xa (10a) Level   Result Value Ref Range    Heparin 10A Level 0.18 IU/mL   Echocardiogram Complete    Narrative    [id number]  [id number]  [id number]  [id number]^BRIGITTE^JOSEPHINE^LEVON           [facility]  Echocardiography Laboratory  [address] [place] [address]        Name: SALOMON DELGADO  MRN: [id number]  : 1970  Study  Date: 03/08/2019 08:07 AM  Age: 48 yrs  Gender: Male  Patient Location: [facility]  Reason For Study: Chest Pain  Ordering Physician: JOSEPHINE BRIGGS  Referring Physician: DAVID NAPOLES  Performed By: Stephanie Sosa RDCS     BSA: 2.7 m2  Height: 75 in  Weight: 326 lb  HR: 63  BP: 160/91 mmHg  _____________________________________________________________________________  __        Procedure  Complete Echo Adult. Contrast Optison.  _____________________________________________________________________________  __        Interpretation Summary     Technically difficult study.  The visual ejection fraction is estimated at 55-60%.  Normal left ventricular wall motion  Dilated inferior vena cava  Borderline/mild aortic root dilatation.  _____________________________________________________________________________  __        Left Ventricle  The left ventricle is normal in size. The visual ejection fraction is  estimated at 55-60%. Diastolic Doppler findings (E/E' ratio and/or other  parameters) suggest left ventricular filling pressures are indeterminate.  Normal left ventricular wall motion.     Right Ventricle  The right ventricle is not well visualized. RV was hard to see but on  subcoastal images, function looks reasonable.     Atria  Normal left atrial size. Right atrial size is normal.     Mitral Valve  There is trace mitral regurgitation.        Tricuspid Valve  There is trace tricuspid regurgitation. Right ventricular systolic pressure  could not be approximated due to inadequate tricuspid regurgitation.     Aortic Valve  The aortic valve is not well visualized. No aortic regurgitation is present.  No hemodynamically significant valvular aortic stenosis.     Pulmonic Valve  The pulmonic valve is not well visualized.     Vessels  Borderline aortic root dilatation. Dilated inferior vena cava.     Pericardium  There is no pericardial effusion.      _____________________________________________________________________________  __  MMode/2D Measurements & Calculations  IVSd: 0.87 cm  LVIDd: 5.2 cm  LVIDs: 2.9 cm  LVPWd: 1.0 cm  FS: 43.8 %  LV mass(C)d: 180.5 grams  LV mass(C)dI: 66.8 grams/m2     Ao root diam: 4.0 cm  LA dimension: 3.9 cm  asc Aorta Diam: 3.3 cm  LA/Ao: 0.97  LVOT diam: 2.7 cm  LVOT area: 5.8 cm2  LA Volume (BP): 46.6 ml  LA Volume Index (BP): 17.3 ml/m2  RWT: 0.41        Doppler Measurements & Calculations  MV E max magda: 82.9 cm/sec  MV A max magda: 65.0 cm/sec  MV E/A: 1.3  MV dec time: 0.24 sec     E/E' av.2  Lateral E/e': 6.8  Medial E/e': 11.6           _____________________________________________________________________________  __           Report approved by: Charity Wilcox 2019 09:17 AM      US Carotid Bilateral    Narrative    BILATERAL CAROTID ULTRASOUND   3/8/2019 10:08 AM     HISTORY:  Preoperative.    COMPARISON: None.    RIGHT CAROTID FINDINGS:  Shadowing plaque at the carotid bulb.  Right ICA PSV:  119  cm/sec.  Right ICA EDV:  27 cm/sec.  Right ICA/CCA PSV Ratio:  1.1    These indicate less than 50% diameter stenosis of the right ICA.    Right Vertebral: Antegrade flow.   Right ECA: Antegrade flow.     LEFT CAROTID FINDINGS:  Plaque at the common carotid and carotid bulb.  Left ICA PSV:  126  cm/sec.  Left ICA EDV:  21 cm/sec.  Left ICA/CCA PSV Ratio:  1.2    These indicate less than 50% diameter stenosis of the left ICA.    Left Vertebral: Antegrade flow.   Left ECA: Antegrade flow.     Causes of Decreased Accuracy:   None.       Impression    IMPRESSION:    1. Less than 50% diameter stenosis of the right ICA relative to the  distal ICA diameter.   2. Less than 50% diameter stenosis of the left ICA relative to the  distal ICA diameter.     ALICE JUÁREZ DO   US Lower Extremity Venous Mapping Bilateral    Narrative    ULTRASOUND LOWER EXTREMITY VENOUS MAPPING BILATERAL 3/8/2019 10:08 AM    DATE OF PROCEDURE:  3/8/2019 10:08 AM    CLINICAL HISTORY/INDICATION:  Preoperative.    FINDINGS/    Impression    IMPRESSION:   1. Right great saphenous vein in the thigh ranges from 4.6 mm to 5.8  mm.  2. Right great saphenous vein at and below the knee ranges from 2.7 mm  to 3.8 mm.  3. Left great saphenous vein in the thigh ranges from 2.8 mm to 5.6  mm.  4. Left great saphenous vein at and below the knee ranges from 3.2 mm  to 3.5 mm.    ALICE JUÁREZ DO   UA with Microscopic reflex to Culture   Result Value Ref Range    Color Urine Yellow     Appearance Urine Clear     Glucose Urine Negative NEG^Negative mg/dL    Bilirubin Urine Negative NEG^Negative    Ketones Urine Negative NEG^Negative mg/dL    Specific Gravity Urine 1.035 1.003 - 1.035    Blood Urine Negative NEG^Negative    pH Urine 6.0 5.0 - 7.0 pH    Protein Albumin Urine 30 (A) NEG^Negative mg/dL    Urobilinogen mg/dL Normal 0.0 - 2.0 mg/dL    Nitrite Urine Negative NEG^Negative    Leukocyte Esterase Urine Negative NEG^Negative    Source UCC     WBC Urine 1 0 - 5 /HPF    RBC Urine 1 0 - 2 /HPF    Squamous Epithelial /HPF Urine <1 0 - 1 /HPF    Mucous Urine Present (A) NEG^Negative /LPF       KEATON Salas, CNP  Cardiology  Pager:  [phone]

## 2019-03-08 NOTE — PLAN OF CARE
Alert and oriented x 4. VS stable, on RA and no complaints of pain. He was up walking in the halls x 1 and tolerated that light activity well. Tele SR/ST with rates in the 's. Plan for CABG work up and surgery next week.

## 2019-03-08 NOTE — PLAN OF CARE
AOx4. VSS. Independent. Cardiac diet. L angio site CDI, bandaid/arm board in place. Tele: NSR. UA needed. K+ replaced 3/7 - 3.4 AM draw on 3/8, will begin replacement. R PIV heparin gtt. CV surgery consult. Denies chest pain. C/o cough - PRN robitussin given. Discharge pending.

## 2019-03-09 LAB
LMWH PPP CHRO-ACNC: 0.18 IU/ML
MRSA DNA SPEC QL NAA+PROBE: NEGATIVE
POTASSIUM SERPL-SCNC: 3.6 MMOL/L (ref 3.4–5.3)
SPECIMEN SOURCE: NORMAL

## 2019-03-09 PROCEDURE — 99233 SBSQ HOSP IP/OBS HIGH 50: CPT | Performed by: INTERNAL MEDICINE

## 2019-03-09 PROCEDURE — 84132 ASSAY OF SERUM POTASSIUM: CPT | Performed by: INTERNAL MEDICINE

## 2019-03-09 PROCEDURE — 85520 HEPARIN ASSAY: CPT | Performed by: INTERNAL MEDICINE

## 2019-03-09 PROCEDURE — 25000128 H RX IP 250 OP 636: Performed by: HOSPITALIST

## 2019-03-09 PROCEDURE — 25000132 ZZH RX MED GY IP 250 OP 250 PS 637: Performed by: INTERNAL MEDICINE

## 2019-03-09 PROCEDURE — 25000132 ZZH RX MED GY IP 250 OP 250 PS 637: Performed by: NURSE PRACTITIONER

## 2019-03-09 PROCEDURE — 25000132 ZZH RX MED GY IP 250 OP 250 PS 637: Performed by: HOSPITALIST

## 2019-03-09 PROCEDURE — 99207 ZZC CDG-MDM COMPONENT: MEETS MODERATE - UP CODED: CPT | Performed by: INTERNAL MEDICINE

## 2019-03-09 PROCEDURE — 21000001 ZZH R&B HEART CARE

## 2019-03-09 PROCEDURE — 36415 COLL VENOUS BLD VENIPUNCTURE: CPT | Performed by: INTERNAL MEDICINE

## 2019-03-09 PROCEDURE — 99232 SBSQ HOSP IP/OBS MODERATE 35: CPT | Performed by: INTERNAL MEDICINE

## 2019-03-09 RX ORDER — OXYCODONE HYDROCHLORIDE 5 MG/1
5 TABLET ORAL EVERY 4 HOURS PRN
Status: DISCONTINUED | OUTPATIENT
Start: 2019-03-09 | End: 2019-03-12

## 2019-03-09 RX ADMIN — ACETAMINOPHEN 650 MG: 325 TABLET, FILM COATED ORAL at 14:34

## 2019-03-09 RX ADMIN — LOSARTAN POTASSIUM 25 MG: 25 TABLET ORAL at 08:42

## 2019-03-09 RX ADMIN — ACETAMINOPHEN 650 MG: 325 TABLET, FILM COATED ORAL at 10:14

## 2019-03-09 RX ADMIN — POTASSIUM CHLORIDE 20 MEQ: 1500 TABLET, EXTENDED RELEASE ORAL at 08:43

## 2019-03-09 RX ADMIN — ACETAMINOPHEN 650 MG: 325 TABLET, FILM COATED ORAL at 22:55

## 2019-03-09 RX ADMIN — ACETAMINOPHEN 650 MG: 325 TABLET, FILM COATED ORAL at 18:54

## 2019-03-09 RX ADMIN — LEVOTHYROXINE SODIUM 50 MCG: 50 TABLET ORAL at 08:43

## 2019-03-09 RX ADMIN — METOPROLOL SUCCINATE 25 MG: 25 TABLET, EXTENDED RELEASE ORAL at 08:42

## 2019-03-09 RX ADMIN — ATORVASTATIN CALCIUM 80 MG: 80 TABLET, FILM COATED ORAL at 20:40

## 2019-03-09 RX ADMIN — HEPARIN SODIUM 1300 UNITS/HR: 10000 INJECTION, SOLUTION INTRAVENOUS at 14:39

## 2019-03-09 RX ADMIN — ASPIRIN 81 MG: 81 TABLET, COATED ORAL at 08:42

## 2019-03-09 RX ADMIN — AMLODIPINE BESYLATE 5 MG: 5 TABLET ORAL at 08:44

## 2019-03-09 ASSESSMENT — ACTIVITIES OF DAILY LIVING (ADL)
ADLS_ACUITY_SCORE: 12

## 2019-03-09 ASSESSMENT — MIFFLIN-ST. JEOR: SCORE: 2441.62

## 2019-03-09 NOTE — PLAN OF CARE
A&Ox4. VSS on RA. Tele SB in the 50's. Denies pain. Ambulating independently. Heparin gtt infusing at 1300 units/hr. Heparin 10A lab this AM. Plan for CABG 3/12. Continue to monitor.

## 2019-03-09 NOTE — PROGRESS NOTES
[facility]    Cardiology Progress Note     Assessment & Plan   Edilberto Brooks is a 48 year old male who was admitted on 3/6/2019 with chest pain and abnml troponin of 1.8. Septal Q waves on EKG and hypertensive on admit with /94 and hyperlipidemia with . Hx of obesity and family hx of CAD, smoked remotely.  at      NSTEMI  -severe three vessel disease  -surgery consulted for 4 vessel cabg.   -EF 55-60%  -borderline/mild aortic root dilatation 4.0 cm  -lipitor increased to 80  -I will continue him on heparin for now  Plan for surgery next week     Hypertension  -controlled on ARB and BB and CCB     Hyperlipidema  -lipitor 80      Iggy Olsen MD  Text Page (7am - 5pm, M-F)    Interval History   The patient reports feeling well.  He does not have any complaints.  He is awaiting surgery which is scheduled for Tuesday.    Physical Exam   Temp: 98.6  F (37  C) Temp src: Oral BP: 139/87   Heart Rate: 58 Resp: 16 SpO2: 96 % O2 Device: None (Room air)    Vitals:    03/07/19 0054 03/08/19 0638 03/09/19 0556   Weight: 147.9 kg (326 lb) 147.9 kg (326 lb 1.6 oz) 148.1 kg (326 lb 8 oz)     Vital Signs with Ranges  Temp:  [97.4  F (36.3  C)-98.8  F (37.1  C)] 98.6  F (37  C)  Heart Rate:  [58-71] 58  Resp:  [16] 16  BP: (139-151)/(74-87) 139/87  SpO2:  [94 %-96 %] 96 %  I/O last 3 completed shifts:  In: 910 [P.O.:910]  Out: 650 [Urine:650]  Patient Active Problem List   Diagnosis     NSTEMI (non-ST elevated myocardial infarction) (H)     SKIN:  Warm and dry.   HEAD:  Normocephalic.   NECK:  Free of thyromegaly.  The carotid upstrokes were normal.   CHEST:   Normal vesicular breath sounds.  There were no wheezes.  Breathing was unlabored.   CARDIAC:  On cardiac auscultation, there was a soft S1 and S2 without a murmur.  The rhythm was regular.   EXTREMITIES:  There is no peripheral pedal edema.   NEUROLOGIC:  Facies were symmetric, and he moved all extremities without focal  limitation    Medications     HEParin 1,300 Units/hr (03/09/19 1798)     - MEDICATION INSTRUCTIONS -       sodium chloride       sodium chloride 75 mL/hr at 03/07/19 1505       amLODIPine  5 mg Oral Daily     aspirin  81 mg Oral Daily     atorvastatin  80 mg Oral QPM     levothyroxine  50 mcg Oral Daily     losartan  25 mg Oral Daily     metoprolol succinate ER  25 mg Oral Daily     nicotine   Transdermal Q8H     nicotine   Transdermal Daily     senna-docusate  1 tablet Oral BID    Or     senna-docusate  2 tablet Oral BID     sodium chloride (PF)  3 mL Intracatheter Q8H       Data   Results for orders placed or performed during the hospital encounter of 03/06/19 (from the past 24 hour(s))   Methicillin Resistant Staph Aureus PCR   Result Value Ref Range    Specimen Description Nares     Methicillin Resist/Sens S. aureus PCR Negative NEG^Negative   Heparin Xa (10a) Level   Result Value Ref Range    Heparin 10A Level 0.18 IU/mL   Potassium   Result Value Ref Range    Potassium 3.6 3.4 - 5.3 mmol/L

## 2019-03-09 NOTE — PROGRESS NOTES
"[facility]  Hospitalist Progress Note        Billie Roche MD   03/09/2019        Interval History:      -Chart reviewed, patient seen.  Denies CP, n/v, f/c.  Complains of arthritic left ankle pain, but does not wish to have medication for it.         Assessment and Plan:        Edilberto Brooks is a 48 year old male with HTN, smoker who presented to the emergency room with anterior chest pressure.     # Non-ST-elevation myocardial infarction   - Tni 0.036--->0.255---1.771  - EKG with NSR with PVCs  -  No beta-blocker for now due to the bronchospasm  - Continue aspirin  - a1c 6.3,   - ECHO shows normal EF  - Remains on heparin  - Angiogram reveals multivessel disease, seen by CT surgery with plan for CABG on Tuesday 3/12     # Probable acute bronchospasm   # Tobacco use disorder   - no prior diagnosis of asthma or COPD; likely URI leading to broncospasm improved with nebs  - Chest x-ray was negative for pneumonia.  - Nicotine patch ordered as needed     # Hypertension   - Continue amlodipine and losartan.  - Hold Dyazide due to the hypokalemia     # Hypokalemia  - resolved, on protocol     # Hyperlipidemia   - Lipitor started 20mg po daily     Diet: Cardiac diet   DVT Prophylaxis: Heparin drip for likely NSTEMI  Code Status: Full      Expected discharge:  Pre-op pathway, CABG planned 3/12, will remain inpatient until surgery.                     Physical Exam:      Blood pressure 141/85, pulse 64, temperature 98.6  F (37  C), temperature source Oral, resp. rate 16, height 1.905 m (6' 3\"), weight 148.1 kg (326 lb 8 oz), SpO2 96 %.  Vitals:    03/07/19 0054 03/08/19 0638 03/09/19 0556   Weight: 147.9 kg (326 lb) 147.9 kg (326 lb 1.6 oz) 148.1 kg (326 lb 8 oz)     Vital Signs with Ranges  Temp:  [97.8  F (36.6  C)-98.8  F (37.1  C)] 98.6  F (37  C)  Heart Rate:  [58-79] 58  Resp:  [16] 16  BP: (123-142)/(74-85) 141/85  SpO2:  [94 %-96 %] 96 %  I/O's Last 24 hours  I/O last 3 completed shifts:  In: " 910 [P.O.:910]  Out: 650 [Urine:650]    Constitutional: Alert, awake and oriented X 3; lying comfortably in bed in no apparent distress   HEENT: Pupils equal and reactive to light and accomodation, EOMI intact; neck supple no raised JVD or rigidity    Oral cavity: Moist mucosa   Cardiovascular: Normal s1 s2, regular rate and rhythm, no murmur   Lungs: B/l clear to auscultation, no wheezes or crepitations   Abdomen: Soft, nt, nd, no guarding, rigidity or rebound; BS +   LE : No edema; psoriatic plaque noted on RLE   Musculoskeletal: Power 5/5 in all extremities   Neuro: No focal neurological deficits noted, CN II to XII grossly intact   Psychiatry: normal mood and affect                Medications:          amLODIPine  5 mg Oral Daily     aspirin  81 mg Oral Daily     atorvastatin  80 mg Oral QPM     levothyroxine  50 mcg Oral Daily     losartan  25 mg Oral Daily     metoprolol succinate ER  25 mg Oral Daily     nicotine   Transdermal Q8H     nicotine   Transdermal Daily     senna-docusate  1 tablet Oral BID    Or     senna-docusate  2 tablet Oral BID     sodium chloride (PF)  3 mL Intracatheter Q8H     PRN Meds: acetaminophen, acetaminophen, bisacodyl, guaiFENesin, HOLD MEDICATION, HOLD MEDICATION, HOLD MEDICATION, HOLD MEDICATION, HOLD MEDICATION, HOLD MEDICATION, HOLD MEDICATION, HOLD MEDICATION, HYDROcodone-acetaminophen, ipratropium - albuterol 0.5 mg/2.5 mg/3 mL, lidocaine 4%, lidocaine (buffered or not buffered), LORazepam **OR** LORazepam, magnesium hydroxide, magnesium sulfate, magnesium sulfate, melatonin, metoclopramide **OR** metoclopramide, naloxone, nicotine, ondansetron **OR** ondansetron, - MEDICATION INSTRUCTIONS -, potassium chloride, potassium chloride with lidocaine, potassium chloride, potassium chloride, potassium chloride, potassium chloride, prochlorperazine **OR** prochlorperazine **OR** prochlorperazine, senna-docusate **OR** senna-docusate, sodium chloride (PF), sodium chloride (PF)          Data:      All new lab and imaging data was reviewed.   Recent Labs   Lab Test 03/07/19 2112 03/07/19 0539 03/06/19 2017   WBC 8.8  --  12.8*   HGB 14.0  --  15.7   MCV 92  --  90    315 361      Recent Labs   Lab Test 03/09/19  0552 03/08/19  0533 03/07/19  1306 03/07/19 0539 03/06/19 2017   NA  --   --   --  137 135   POTASSIUM 3.6 3.4 3.4 3.3* 2.6*   CHLORIDE  --   --   --  99 97   CO2  --   --   --  31 28   BUN  --   --   --  17 19   CR  --   --   --  1.17 1.17   ANIONGAP  --   --   --  7 10   KAREN  --   --   --  8.7 8.6   GLC  --   --   --  97 130*     Recent Labs   Lab Test 03/07/19 0539 03/06/19 2138 03/06/19 2017   TROPI 1.771* 0.255* 0.036

## 2019-03-09 NOTE — PLAN OF CARE
A&O. VSS on RA. Up ind. Denies pain. Heparin at 1300u/hr. Left wrist Intact. Plans for surgery on Tuesday.

## 2019-03-09 NOTE — PROGRESS NOTES
CV Surgery    Chart reviewed. Pre-op orders in place. Will plan for surgery on Tuesday 3/12/19 at 0800. Will continue to follow.     Rebecca Lowe PA-C  CV Surgery

## 2019-03-10 ENCOUNTER — APPOINTMENT (OUTPATIENT)
Dept: GENERAL RADIOLOGY | Facility: CLINIC | Age: 49
DRG: 234 | End: 2019-03-10
Attending: INTERNAL MEDICINE
Payer: COMMERCIAL

## 2019-03-10 LAB
CREAT SERPL-MCNC: 1.04 MG/DL (ref 0.66–1.25)
ERYTHROCYTE [DISTWIDTH] IN BLOOD BY AUTOMATED COUNT: 13.5 % (ref 10–15)
GFR SERPL CREATININE-BSD FRML MDRD: 85 ML/MIN/{1.73_M2}
HCT VFR BLD AUTO: 39.2 % (ref 40–53)
HGB BLD-MCNC: 13.2 G/DL (ref 13.3–17.7)
LMWH PPP CHRO-ACNC: 0.12 IU/ML
LMWH PPP CHRO-ACNC: 0.21 IU/ML
MCH RBC QN AUTO: 30.9 PG (ref 26.5–33)
MCHC RBC AUTO-ENTMCNC: 33.7 G/DL (ref 31.5–36.5)
MCV RBC AUTO: 92 FL (ref 78–100)
PLATELET # BLD AUTO: 259 10E9/L (ref 150–450)
RBC # BLD AUTO: 4.27 10E12/L (ref 4.4–5.9)
WBC # BLD AUTO: 9.6 10E9/L (ref 4–11)

## 2019-03-10 PROCEDURE — 25000132 ZZH RX MED GY IP 250 OP 250 PS 637: Performed by: INTERNAL MEDICINE

## 2019-03-10 PROCEDURE — 85027 COMPLETE CBC AUTOMATED: CPT | Performed by: INTERNAL MEDICINE

## 2019-03-10 PROCEDURE — 21000001 ZZH R&B HEART CARE

## 2019-03-10 PROCEDURE — 85520 HEPARIN ASSAY: CPT | Performed by: INTERNAL MEDICINE

## 2019-03-10 PROCEDURE — 36415 COLL VENOUS BLD VENIPUNCTURE: CPT | Performed by: INTERNAL MEDICINE

## 2019-03-10 PROCEDURE — 36415 COLL VENOUS BLD VENIPUNCTURE: CPT | Performed by: HOSPITALIST

## 2019-03-10 PROCEDURE — 25000132 ZZH RX MED GY IP 250 OP 250 PS 637: Performed by: HOSPITALIST

## 2019-03-10 PROCEDURE — 25000128 H RX IP 250 OP 636: Performed by: HOSPITALIST

## 2019-03-10 PROCEDURE — 25000132 ZZH RX MED GY IP 250 OP 250 PS 637: Performed by: NURSE PRACTITIONER

## 2019-03-10 PROCEDURE — 82565 ASSAY OF CREATININE: CPT | Performed by: HOSPITALIST

## 2019-03-10 PROCEDURE — 99232 SBSQ HOSP IP/OBS MODERATE 35: CPT | Performed by: INTERNAL MEDICINE

## 2019-03-10 PROCEDURE — 73610 X-RAY EXAM OF ANKLE: CPT | Mod: RT

## 2019-03-10 RX ORDER — MORPHINE SULFATE 2 MG/ML
1-2 INJECTION, SOLUTION INTRAMUSCULAR; INTRAVENOUS
Status: DISCONTINUED | OUTPATIENT
Start: 2019-03-10 | End: 2019-03-12

## 2019-03-10 RX ORDER — COLCHICINE 0.6 MG/1
0.6 TABLET ORAL DAILY
Status: DISCONTINUED | OUTPATIENT
Start: 2019-03-10 | End: 2019-03-11

## 2019-03-10 RX ADMIN — ACETAMINOPHEN 650 MG: 325 TABLET, FILM COATED ORAL at 23:17

## 2019-03-10 RX ADMIN — ACETAMINOPHEN 650 MG: 325 TABLET, FILM COATED ORAL at 17:49

## 2019-03-10 RX ADMIN — LEVOTHYROXINE SODIUM 50 MCG: 50 TABLET ORAL at 08:36

## 2019-03-10 RX ADMIN — HEPARIN SODIUM 1500 UNITS/HR: 10000 INJECTION, SOLUTION INTRAVENOUS at 08:41

## 2019-03-10 RX ADMIN — AMLODIPINE BESYLATE 5 MG: 5 TABLET ORAL at 08:36

## 2019-03-10 RX ADMIN — METOPROLOL SUCCINATE 25 MG: 25 TABLET, EXTENDED RELEASE ORAL at 08:37

## 2019-03-10 RX ADMIN — HEPARIN SODIUM 1500 UNITS/HR: 10000 INJECTION, SOLUTION INTRAVENOUS at 23:43

## 2019-03-10 RX ADMIN — ASPIRIN 81 MG: 81 TABLET, COATED ORAL at 08:37

## 2019-03-10 RX ADMIN — ACETAMINOPHEN 650 MG: 325 TABLET, FILM COATED ORAL at 13:18

## 2019-03-10 RX ADMIN — LOSARTAN POTASSIUM 25 MG: 25 TABLET ORAL at 08:37

## 2019-03-10 RX ADMIN — Medication 3300 UNITS: at 08:33

## 2019-03-10 RX ADMIN — ACETAMINOPHEN 650 MG: 325 TABLET, FILM COATED ORAL at 09:12

## 2019-03-10 RX ADMIN — ATORVASTATIN CALCIUM 80 MG: 80 TABLET, FILM COATED ORAL at 21:40

## 2019-03-10 RX ADMIN — OXYCODONE HYDROCHLORIDE 5 MG: 5 TABLET ORAL at 00:33

## 2019-03-10 RX ADMIN — COLCHICINE 0.6 MG: 0.6 TABLET, FILM COATED ORAL at 17:45

## 2019-03-10 ASSESSMENT — ACTIVITIES OF DAILY LIVING (ADL)
ADLS_ACUITY_SCORE: 12
ADLS_ACUITY_SCORE: 16
ADLS_ACUITY_SCORE: 12
ADLS_ACUITY_SCORE: 16
ADLS_ACUITY_SCORE: 12
ADLS_ACUITY_SCORE: 12

## 2019-03-10 NOTE — PROGRESS NOTES
[facility]    Cardiology Progress Note     Assessment & Plan   Edilberto Brooks is a 48 year old male who was admitted on 3/6/2019 with chest pain and abnml troponin of 1.8. Septal Q waves on EKG and hypertensive on admit with /94 and hyperlipidemia with . Hx of obesity and family hx of CAD, smoked remotely.  at      NSTEMI   - normal LV EF of 55%  -severe three vessel disease and scheduled for CABG on tuesday  -borderline/mild aortic root dilatation 4.0 cm  -lipitor increased to 80  -I will continue him on heparin for now  - will continue Bb at current dose in anticipation of CABG in 2 days.      Hypertension  -controlled on ARB and BB and CCB     Hyperlipidema  -lipitor 80    Iggy Olsen MD  Text Page (7am - 5pm, M-F)    Interval History   The patient reports feeling well.  He does not have any complaints.  He is awaiting surgery which is scheduled for Tuesday.    Physical Exam   Temp: 98.4  F (36.9  C) Temp src: Tympanic BP: 118/72   Heart Rate: 77 Resp: 16 SpO2: 97 % O2 Device: None (Room air)    Vitals:    03/07/19 0054 03/08/19 0638 03/09/19 0556   Weight: 147.9 kg (326 lb) 147.9 kg (326 lb 1.6 oz) 148.1 kg (326 lb 8 oz)     Vital Signs with Ranges  Temp:  [98.4  F (36.9  C)-99  F (37.2  C)] 98.4  F (36.9  C)  Heart Rate:  [63-77] 77  Resp:  [16] 16  BP: (118-147)/(72-90) 118/72  SpO2:  [94 %-100 %] 97 %  I/O last 3 completed shifts:  In: 480 [P.O.:480]  Out: 600 [Urine:600]  Patient Active Problem List   Diagnosis     NSTEMI (non-ST elevated myocardial infarction) (H)     SKIN:  Warm and dry.   HEAD:  Normocephalic.   NECK:  Free of thyromegaly.  The carotid upstrokes were normal.   CHEST:   Normal vesicular breath sounds.  There were no wheezes.  Breathing was unlabored.   CARDIAC:  On cardiac auscultation, there was a soft S1 and S2 without a murmur.  The rhythm was regular.   EXTREMITIES:  There is no peripheral pedal edema.   NEUROLOGIC:  Facies were symmetric, and he  moved all extremities without focal limitation    Medications     HEParin 1,500 Units/hr (03/10/19 0817)     - MEDICATION INSTRUCTIONS -         amLODIPine  5 mg Oral Daily     aspirin  81 mg Oral Daily     atorvastatin  80 mg Oral QPM     levothyroxine  50 mcg Oral Daily     losartan  25 mg Oral Daily     metoprolol succinate ER  25 mg Oral Daily     nicotine   Transdermal Q8H     nicotine   Transdermal Daily     senna-docusate  1 tablet Oral BID    Or     senna-docusate  2 tablet Oral BID     sodium chloride (PF)  3 mL Intracatheter Q8H       Data   Results for orders placed or performed during the hospital encounter of 03/06/19 (from the past 24 hour(s))   Creatinine   Result Value Ref Range    Creatinine 1.04 0.66 - 1.25 mg/dL    GFR Estimate 85 >60 mL/min/[1.73_m2]    GFR Estimate If Black >90 >60 mL/min/[1.73_m2]   Heparin Xa (10a) Level   Result Value Ref Range    Heparin 10A Level 0.12 IU/mL   CBC (AM Draw)   Result Value Ref Range    WBC 9.6 4.0 - 11.0 10e9/L    RBC Count 4.27 (L) 4.4 - 5.9 10e12/L    Hemoglobin 13.2 (L) 13.3 - 17.7 g/dL    Hematocrit 39.2 (L) 40.0 - 53.0 %    MCV 92 78 - 100 fl    MCH 30.9 26.5 - 33.0 pg    MCHC 33.7 31.5 - 36.5 g/dL    RDW 13.5 10.0 - 15.0 %    Platelet Count 259 150 - 450 10e9/L

## 2019-03-10 NOTE — PLAN OF CARE
A&Ox4. VSS. RA. CMS intact. Lungs clear. Bowel sounds active. Independent in room. Heparin gtt infusing. Left radial site CDI. C/O pain in ankle 5/10, PRN tylenol given/ice/elevated. +1 right hand edema. Tele Sinus ladan dysrhythmia.

## 2019-03-10 NOTE — PROVIDER NOTIFICATION
Brief update:    Paged re: ankle pain, requests ibuprofen.    NSTEMI w/ plan for CABG. Would not initiate on NSAID given primary cardiac issue.    Oxycodone at low dose initiated.    Mk Gray MD  10:48 PM

## 2019-03-10 NOTE — PROGRESS NOTES
"[facility]  Hospitalist Progress Note        Annalise Onofre MD   03/10/2019        Interval History:      -Chart reviewed, patient seen.  Denies CP, n/v, f/c.  Complains of arthritic left ankle pain, but no NSAIDs can be given.          Assessment and Plan:        Edilberto Brooks is a 48 year old male with HTN, smoker who presented to the emergency room with anterior chest pressure.     # Non-ST-elevation myocardial infarction   - Tni 0.036--->0.255---1.771  - EKG with NSR with PVCs  -  No beta-blocker for now due to the bronchospasm  - Continue aspirin  - a1c 6.3,   - ECHO shows normal EF  - Remains on heparin  - Angiogram reveals multivessel disease, seen by CT surgery with plan for CABG on Tuesday 3/12     # Probable acute bronchospasm   # Tobacco use disorder   - no prior diagnosis of asthma or COPD; likely URI leading to broncospasm improved with nebs  - Chest x-ray was negative for pneumonia.  - Nicotine patch ordered as needed     # Hypertension   - Continue amlodipine and losartan.  - Hold Dyazide due to the hypokalemia     # Hypokalemia  - resolved, on protocol     # Hyperlipidemia   - Lipitor started 20mg po daily     Diet: Cardiac diet   DVT Prophylaxis: Heparin drip for likely NSTEMI  Code Status: Full      Expected discharge:  Pre-op pathway, CABG planned 3/12, will remain inpatient until surgery.                     Physical Exam:      Blood pressure 118/72, pulse 64, temperature 98.4  F (36.9  C), temperature source Tympanic, resp. rate 16, height 1.905 m (6' 3\"), weight 148.1 kg (326 lb 8 oz), SpO2 97 %.  Vitals:    03/07/19 0054 03/08/19 0638 03/09/19 0556   Weight: 147.9 kg (326 lb) 147.9 kg (326 lb 1.6 oz) 148.1 kg (326 lb 8 oz)     Vital Signs with Ranges  Temp:  [98.4  F (36.9  C)-99  F (37.2  C)] 98.4  F (36.9  C)  Heart Rate:  [58-77] 77  Resp:  [16] 16  BP: (118-147)/(72-90) 118/72  SpO2:  [94 %-100 %] 97 %  I/O's Last 24 hours  I/O last 3 completed shifts:  In: 480 " [P.O.:480]  Out: 600 [Urine:600]    Constitutional: Alert, awake and oriented X 3; lying comfortably in bed in no apparent distress   HEENT: Pupils equal and reactive to light and accomodation, EOMI intact; neck supple no raised JVD or rigidity    Oral cavity: Moist mucosa   Cardiovascular: Normal s1 s2, regular rate and rhythm, no murmur   Lungs: B/l clear to auscultation, no wheezes or crepitations   Abdomen: Soft, nt, nd, no guarding, rigidity or rebound; BS +   LE : No edema; psoriatic plaque noted on RLE   Musculoskeletal: Power 5/5 in all extremities   Neuro: No focal neurological deficits noted, CN II to XII grossly intact   Psychiatry: normal mood and affect                Medications:          amLODIPine  5 mg Oral Daily     aspirin  81 mg Oral Daily     atorvastatin  80 mg Oral QPM     levothyroxine  50 mcg Oral Daily     losartan  25 mg Oral Daily     metoprolol succinate ER  25 mg Oral Daily     nicotine   Transdermal Q8H     nicotine   Transdermal Daily     senna-docusate  1 tablet Oral BID    Or     senna-docusate  2 tablet Oral BID     sodium chloride (PF)  3 mL Intracatheter Q8H     PRN Meds: acetaminophen, acetaminophen, bisacodyl, guaiFENesin, HOLD MEDICATION, HOLD MEDICATION, HOLD MEDICATION, HOLD MEDICATION, HOLD MEDICATION, HOLD MEDICATION, HOLD MEDICATION, HOLD MEDICATION, HYDROcodone-acetaminophen, ipratropium - albuterol 0.5 mg/2.5 mg/3 mL, lidocaine 4%, lidocaine (buffered or not buffered), LORazepam **OR** LORazepam, magnesium hydroxide, magnesium sulfate, magnesium sulfate, melatonin, metoclopramide **OR** metoclopramide, naloxone, nicotine, ondansetron **OR** ondansetron, oxyCODONE, - MEDICATION INSTRUCTIONS -, potassium chloride, potassium chloride with lidocaine, potassium chloride, potassium chloride, potassium chloride, potassium chloride, prochlorperazine **OR** prochlorperazine **OR** prochlorperazine, senna-docusate **OR** senna-docusate, sodium chloride (PF), sodium chloride  (PF)         Data:      All new lab and imaging data was reviewed.   Recent Labs   Lab Test 03/10/19  0615 03/07/19  2112 03/07/19 0539 03/06/19 2017   WBC 9.6 8.8  --  12.8*   HGB 13.2* 14.0  --  15.7   MCV 92 92  --  90    288 315 361      Recent Labs   Lab Test 03/10/19  0615 03/09/19  0552 03/08/19  0533 03/07/19  1306 03/07/19  0539 03/06/19 2017   NA  --   --   --   --  137 135   POTASSIUM  --  3.6 3.4 3.4 3.3* 2.6*   CHLORIDE  --   --   --   --  99 97   CO2  --   --   --   --  31 28   BUN  --   --   --   --  17 19   CR 1.04  --   --   --  1.17 1.17   ANIONGAP  --   --   --   --  7 10   KAREN  --   --   --   --  8.7 8.6   GLC  --   --   --   --  97 130*     Recent Labs   Lab Test 03/07/19  0539 03/06/19 2138 03/06/19 2017   TROPI 1.771* 0.255* 0.036

## 2019-03-10 NOTE — PROGRESS NOTES
CV surgery Progress note    Patient seen with Dr. Diggs  Discussed expectations of surgery and recovery  Questions answered  Plan for CABG on Tuesday with Dr. Sergey Zavala PA-C  CV surgery   Pager #[phone]

## 2019-03-10 NOTE — PLAN OF CARE
Full code. SR 60s/SB 50s. C/o 5/10 R ankle pain, insufficient relief with 650 tylenol Q4, new orders for 5 mg oxycodone. Angio L radial site stable. Plan 4v CABG Tuesday 3/112, checklist in progress. Heparin gtt. Continue to monitor.

## 2019-03-11 LAB
ABO + RH BLD: NORMAL
ABO + RH BLD: NORMAL
ALBUMIN SERPL-MCNC: 3 G/DL (ref 3.4–5)
ALP SERPL-CCNC: 98 U/L (ref 40–150)
ALT SERPL W P-5'-P-CCNC: 65 U/L (ref 0–70)
ANION GAP SERPL CALCULATED.3IONS-SCNC: 7 MMOL/L (ref 3–14)
AST SERPL W P-5'-P-CCNC: 33 U/L (ref 0–45)
BILIRUB SERPL-MCNC: 0.7 MG/DL (ref 0.2–1.3)
BLD GP AB SCN SERPL QL: NORMAL
BLD PROD TYP BPU: NORMAL
BLOOD BANK CMNT PATIENT-IMP: NORMAL
BUN SERPL-MCNC: 15 MG/DL (ref 7–30)
CALCIUM SERPL-MCNC: 8.4 MG/DL (ref 8.5–10.1)
CHLORIDE SERPL-SCNC: 106 MMOL/L (ref 94–109)
CO2 SERPL-SCNC: 26 MMOL/L (ref 20–32)
CREAT SERPL-MCNC: 0.98 MG/DL (ref 0.66–1.25)
CRP SERPL-MCNC: 85.2 MG/L (ref 0–8)
ERYTHROCYTE [SEDIMENTATION RATE] IN BLOOD BY WESTERGREN METHOD: 19 MM/H (ref 0–15)
GFR SERPL CREATININE-BSD FRML MDRD: >90 ML/MIN/{1.73_M2}
GLUCOSE SERPL-MCNC: 94 MG/DL (ref 70–99)
INTERPRETATION ECG - MUSE: NORMAL
LMWH PPP CHRO-ACNC: 0.17 IU/ML
NUM BPU REQUESTED: 4
POTASSIUM SERPL-SCNC: 3.3 MMOL/L (ref 3.4–5.3)
POTASSIUM SERPL-SCNC: 3.9 MMOL/L (ref 3.4–5.3)
PROT SERPL-MCNC: 6.5 G/DL (ref 6.8–8.8)
SODIUM SERPL-SCNC: 139 MMOL/L (ref 133–144)
SPECIMEN EXP DATE BLD: NORMAL
URATE SERPL-MCNC: 7 MG/DL (ref 3.5–7.2)

## 2019-03-11 PROCEDURE — 25000132 ZZH RX MED GY IP 250 OP 250 PS 637: Performed by: INTERNAL MEDICINE

## 2019-03-11 PROCEDURE — 86923 COMPATIBILITY TEST ELECTRIC: CPT | Performed by: INTERNAL MEDICINE

## 2019-03-11 PROCEDURE — 85520 HEPARIN ASSAY: CPT | Performed by: INTERNAL MEDICINE

## 2019-03-11 PROCEDURE — 36415 COLL VENOUS BLD VENIPUNCTURE: CPT | Performed by: PHYSICIAN ASSISTANT

## 2019-03-11 PROCEDURE — 99207 ZZC CDG-MDM COMPONENT: MEETS MODERATE - UP CODED: CPT | Performed by: INTERNAL MEDICINE

## 2019-03-11 PROCEDURE — 86900 BLOOD TYPING SEROLOGIC ABO: CPT | Performed by: INTERNAL MEDICINE

## 2019-03-11 PROCEDURE — 21000001 ZZH R&B HEART CARE

## 2019-03-11 PROCEDURE — 80053 COMPREHEN METABOLIC PANEL: CPT | Performed by: INTERNAL MEDICINE

## 2019-03-11 PROCEDURE — 86901 BLOOD TYPING SEROLOGIC RH(D): CPT | Performed by: INTERNAL MEDICINE

## 2019-03-11 PROCEDURE — 84132 ASSAY OF SERUM POTASSIUM: CPT | Performed by: INTERNAL MEDICINE

## 2019-03-11 PROCEDURE — 25000132 ZZH RX MED GY IP 250 OP 250 PS 637: Performed by: NURSE PRACTITIONER

## 2019-03-11 PROCEDURE — 25000125 ZZHC RX 250: Performed by: INTERNAL MEDICINE

## 2019-03-11 PROCEDURE — 85652 RBC SED RATE AUTOMATED: CPT | Performed by: PHYSICIAN ASSISTANT

## 2019-03-11 PROCEDURE — 25000128 H RX IP 250 OP 636: Performed by: HOSPITALIST

## 2019-03-11 PROCEDURE — 99233 SBSQ HOSP IP/OBS HIGH 50: CPT | Performed by: INTERNAL MEDICINE

## 2019-03-11 PROCEDURE — 86850 RBC ANTIBODY SCREEN: CPT | Performed by: INTERNAL MEDICINE

## 2019-03-11 PROCEDURE — 36415 COLL VENOUS BLD VENIPUNCTURE: CPT | Performed by: INTERNAL MEDICINE

## 2019-03-11 PROCEDURE — 84550 ASSAY OF BLOOD/URIC ACID: CPT | Performed by: INTERNAL MEDICINE

## 2019-03-11 PROCEDURE — 25000132 ZZH RX MED GY IP 250 OP 250 PS 637: Performed by: HOSPITALIST

## 2019-03-11 PROCEDURE — 86140 C-REACTIVE PROTEIN: CPT | Performed by: INTERNAL MEDICINE

## 2019-03-11 PROCEDURE — 99232 SBSQ HOSP IP/OBS MODERATE 35: CPT | Performed by: INTERNAL MEDICINE

## 2019-03-11 RX ORDER — ACETAMINOPHEN 500 MG
500-1000 TABLET ORAL EVERY 6 HOURS PRN
Status: DISCONTINUED | OUTPATIENT
Start: 2019-03-11 | End: 2019-03-12

## 2019-03-11 RX ORDER — PREDNISONE 10 MG/1
40 TABLET ORAL DAILY
Status: DISCONTINUED | OUTPATIENT
Start: 2019-03-11 | End: 2019-03-13

## 2019-03-11 RX ORDER — MUPIROCIN 20 MG/G
OINTMENT TOPICAL 2 TIMES DAILY
Status: COMPLETED | OUTPATIENT
Start: 2019-03-11 | End: 2019-03-12

## 2019-03-11 RX ADMIN — PREDNISONE 40 MG: 20 TABLET ORAL at 13:37

## 2019-03-11 RX ADMIN — LEVOTHYROXINE SODIUM 50 MCG: 50 TABLET ORAL at 08:43

## 2019-03-11 RX ADMIN — LOSARTAN POTASSIUM 25 MG: 25 TABLET ORAL at 08:42

## 2019-03-11 RX ADMIN — MUPIROCIN 1 EACH: 20 OINTMENT TOPICAL at 22:30

## 2019-03-11 RX ADMIN — ACETAMINOPHEN 650 MG: 325 TABLET, FILM COATED ORAL at 05:33

## 2019-03-11 RX ADMIN — POTASSIUM CHLORIDE 20 MEQ: 1500 TABLET, EXTENDED RELEASE ORAL at 11:03

## 2019-03-11 RX ADMIN — HEPARIN SODIUM 1500 UNITS/HR: 10000 INJECTION, SOLUTION INTRAVENOUS at 20:07

## 2019-03-11 RX ADMIN — AMLODIPINE BESYLATE 5 MG: 5 TABLET ORAL at 08:43

## 2019-03-11 RX ADMIN — ASPIRIN 81 MG: 81 TABLET, COATED ORAL at 08:43

## 2019-03-11 RX ADMIN — ATORVASTATIN CALCIUM 80 MG: 80 TABLET, FILM COATED ORAL at 20:07

## 2019-03-11 RX ADMIN — ACETAMINOPHEN 1000 MG: 500 TABLET, FILM COATED ORAL at 13:37

## 2019-03-11 RX ADMIN — POTASSIUM CHLORIDE 40 MEQ: 1500 TABLET, EXTENDED RELEASE ORAL at 08:42

## 2019-03-11 RX ADMIN — POTASSIUM CHLORIDE 20 MEQ: 1500 TABLET, EXTENDED RELEASE ORAL at 15:40

## 2019-03-11 RX ADMIN — COLCHICINE 0.6 MG: 0.6 TABLET, FILM COATED ORAL at 08:43

## 2019-03-11 RX ADMIN — METOPROLOL SUCCINATE 25 MG: 25 TABLET, EXTENDED RELEASE ORAL at 08:42

## 2019-03-11 ASSESSMENT — ACTIVITIES OF DAILY LIVING (ADL)
ADLS_ACUITY_SCORE: 14

## 2019-03-11 ASSESSMENT — MIFFLIN-ST. JEOR: SCORE: 2433.01

## 2019-03-11 NOTE — PLAN OF CARE
VSS, room air, SR, independent in room mostly, did have 1 episode of diarrhea this AM with some incontinence. R ankle pain-tylenol and prednisone for gout, CAM boot is helpful. Planning CABG 0800 3/12, has seen all videos and pamphlets, is aware for family to come by 0600 prior to pre-op but states his family can wait until later to come see him. Needs scrub tonight before bed.

## 2019-03-11 NOTE — CONSULTS
[facility]    Orthopedic Consultation    Edilberto Brooks MRN# [id number]   Age: 48 year old YOB: 1970     Date of Admission:  3/6/2019    Reason for consult: Left ankle pain       Requesting physician: Dr. Annalise Onofre       Level of consult: Consult, follow and place orders           Assessment and Plan:   Assessment:   Atraumatic left ankle pain, likely gout vs pseudo gout       Plan:   Conservative treatment measures at this time  Continue colchicine versus oral steroid and will observe level of relief  Discussed with patient aspiration would be helpful in definitive diagnosis but at this time patient would prefer to continue with current cares, if increases or medication does not yield improvement will order IR aspiration of the right ankle  I also discussed with him that it may be likely if this is proven as gout flare a intra-articular cortisone injection may be indicated  Weight-bear as tolerated  Cam boot ordered           Chief Complaint:   Atraumatic right ankle pain         History of Present Illness:   This patient is a 48 year old male who presents with the following condition requiring a hospital admission:    Patient initially presented on 3/6/19 with chest pain.  He has since been diagnosed with MI and is scheduled for CABG tomorrow 3/12/19.  He states that 24 hours ago he woke up with atraumatic right ankle pain.  He denies any feelings of an event that may have precipitated this.  He does not have a past history of gout.  He does state that he has had random increase in pain in his ankle for some time which is usually alleviated with OTC ibuprofen.  He denies any recent fever, chills, nausea, vomiting, night sweats, unintentional weight loss.  He has not had any recent procedures such as dental cleaning or colonoscopy.  He is not aware if he has a family history of gout.  He is currently taking a diuretic.  He does not note if he has been recently dehydrated.  He denies any  significant use of air or wine recently.          Past Medical History:     Past Medical History:   Diagnosis Date     Hypercholesteremia      Hypertension      Obesity      Tobacco use disorder, continuous              Past Surgical History:     Past Surgical History:   Procedure Laterality Date     CV HEART CATHETERIZATION WITH POSSIBLE INTERVENTION N/A 3/7/2019    Procedure: Heart Catheterization with possible Intervention;  Surgeon: Drew Logan MD;  Location:  HEART CARDIAC CATH LAB     FRACTURE SURGERY  1997    left leg              Social History:     Social History     Tobacco Use     Smoking status: Current Every Day Smoker     Packs/day: 0.50     Years: 17.00     Pack years: 8.50     Smokeless tobacco: Never Used   Substance Use Topics     Alcohol use: Yes     Comment: social              Family History:     Family History   Problem Relation Age of Onset     Heart Disease Mother         MI in 60's     Breast Cancer Mother      Pacemaker Father      Colon Cancer Father 70     No Known Problems Sister      Diabetes No family hx of              Immunizations:     VACCINE/DOSE   Diptheria   DPT   DTAP   HBIG   Hepatitis A   Hepatitis B   HIB   Influenza   Measles   Meningococcal   MMR   Mumps   Pneumococcal   Polio   Rubella   Small Pox   TDAP   Varicella   Zoster             Allergies:     Allergies   Allergen Reactions     Codeine      Hypotension/ Dizziness                Medications:     Current Facility-Administered Medications   Medication     acetaminophen (TYLENOL) tablet 325-650 mg     acetaminophen (TYLENOL) tablet 650 mg     amLODIPine (NORVASC) tablet 5 mg     aspirin EC tablet 81 mg     atorvastatin (LIPITOR) tablet 80 mg     bisacodyl (DULCOLAX) Suppository 10 mg     colchicine (COLCYRS) tablet 0.6 mg     guaiFENesin (ROBITUSSIN) 20 mg/mL solution 10 mL     heparin infusion 25,000 units in 0.45% NaCl 250 mL     HOLD:  Metformin and metformin containing medications if patient received  IV contrast with acute kidney injury or severe chronic kidney disease (stage IV or stage V; i.e., eGFR less than 30)     HOLD: All meds AM of surgery except: any Beta-blocker, Insulin, and Ranitidine (Zantac)     HOLD: Clopidogrel (PLAVIX) 5 days prior to surgery     HOLD: Dabigatran (PRADAXA) 5 days prior to surgery     HOLD: Eptifibatide (INTEGRILIN) 12 hours prior to surgery     HOLD: Heparin 2 hours prior to surgery     HOLD: NSAIDS 7 days before surgery (except for aspirin, unless otherwise instructed)     HOLD: Prasugrel (EFFIENT) 7 days prior to surgery     HOLD: Rivaroxaban (XARELTO) 3 days prior to surgery     HOLD: Ticagrelor (BRILINTA) 5 days prior to surgery     ipratropium - albuterol 0.5 mg/2.5 mg/3 mL (DUONEB) neb solution 3 mL     levothyroxine (SYNTHROID/LEVOTHROID) tablet 50 mcg     lidocaine (LMX4) cream     lidocaine 1 % 1 mL     LORazepam (ATIVAN) injection 0.5 mg    Or     LORazepam (ATIVAN) tablet 0.5 mg     losartan (COZAAR) tablet 25 mg     magnesium hydroxide (MILK OF MAGNESIA) suspension 30 mL     magnesium sulfate 2 g in water intermittent infusion     magnesium sulfate 4 g in 100 mL sterile water (premade)     melatonin tablet 1 mg     metoclopramide (REGLAN) tablet 10 mg    Or     metoclopramide (REGLAN) injection 10 mg     metoprolol succinate ER (TOPROL-XL) 24 hr tablet 25 mg     morphine (PF) injection 1-2 mg     mupirocin (BACTROBAN) 2 % ointment     naloxone (NARCAN) injection 0.1-0.4 mg     nicotine Patch in Place     nicotine patch REMOVAL     ondansetron (ZOFRAN-ODT) ODT tab 4 mg    Or     ondansetron (ZOFRAN) injection 4 mg     oxyCODONE (ROXICODONE) tablet 5 mg     Patient is NOT allergic to contrast dye OR was given pre-procedure oral steroids for treatment     potassium chloride (KLOR-CON) Packet 20-40 mEq     potassium chloride 10 mEq in 100 mL intermittent infusion with 10 mg lidocaine     potassium chloride 10 mEq in 100 mL sterile water intermittent infusion (premix)      potassium chloride 20 mEq in 50 mL intermittent infusion     potassium chloride ER (K-DUR/KLOR-CON M) CR tablet 20 mEq     potassium chloride ER (K-DUR/KLOR-CON M) CR tablet 20-40 mEq     prochlorperazine (COMPAZINE) injection 10 mg    Or     prochlorperazine (COMPAZINE) tablet 10 mg    Or     prochlorperazine (COMPAZINE) Suppository 25 mg     ranitidine (ZANTAC) tablet 150 mg     senna-docusate (SENOKOT-S/PERICOLACE) 8.6-50 MG per tablet 1 tablet    Or     senna-docusate (SENOKOT-S/PERICOLACE) 8.6-50 MG per tablet 2 tablet     senna-docusate (SENOKOT-S/PERICOLACE) 8.6-50 MG per tablet 1 tablet    Or     senna-docusate (SENOKOT-S/PERICOLACE) 8.6-50 MG per tablet 2 tablet     sodium chloride (PF) 0.9% PF flush 3 mL     sodium chloride (PF) 0.9% PF flush 3 mL     sodium chloride (PF) 0.9% PF flush 3 mL             Review of Systems:   C: NEGATIVE for fever, chills, change in weight  E/M: NEGATIVE for ear, mouth and throat problems  R: NEGATIVE for significant cough or SOB          Physical Exam:   All vitals have been reviewed  Patient Vitals for the past 24 hrs:   BP Temp Temp src Pulse Heart Rate Resp SpO2 Weight   03/11/19 0659 -- -- -- -- -- -- -- 147.2 kg (324 lb 9.6 oz)   03/11/19 0500 143/73 98.8  F (37.1  C) Oral 73 -- -- 95 % --   03/10/19 2317 126/76 99.1  F (37.3  C) Oral 79 -- 18 96 % --   03/10/19 2100 151/77 99  F (37.2  C) Oral 71 -- 16 94 % --   03/10/19 1834 -- -- -- -- -- 16 97 % --   03/10/19 1749 -- -- -- -- -- 16 97 % --   03/10/19 1600 121/79 98.2  F (36.8  C) Oral 73 -- 16 97 % --   03/10/19 1403 -- -- -- -- -- 16 96 % --   03/10/19 1318 126/83 98.2  F (36.8  C) Oral 72 -- 16 96 % --   03/10/19 0957 -- -- -- -- -- 16 97 % --   03/10/19 0912 -- -- -- -- -- 16 97 % --   03/10/19 0827 118/72 98.4  F (36.9  C) Tympanic -- 77 16 97 % --       Intake/Output Summary (Last 24 hours) at 3/11/2019 0736  Last data filed at 3/10/2019 1939  Gross per 24 hour   Intake 1190 ml   Output 350 ml   Net 840 ml      Patient laying comfortably in bed on my exam  Right ankle mildly swollen through the anterior aspect of the joint  Very minimal tenderness to palpation over ankle joint as a whole  Active R OM of ankle painful from ankle joint to MTPs  Passive R OM of the ankle limited but without pain  No erythema noted  Ankle is stable to anterior drawer and posterior drawer testing  + DP pulse  Less than 2S cap refill            Data:   All laboratory data reviewed  Results for orders placed or performed during the hospital encounter of 03/06/19   XR Chest 2 Views    Narrative    CHEST TWO VIEWS  3/6/2019 9:31 PM     HISTORY: Chest pain    COMPARISON: None.      Impression    IMPRESSION: Normal.    SUSANA PEREZ MD   US Carotid Bilateral    Narrative    BILATERAL CAROTID ULTRASOUND   3/8/2019 10:08 AM     HISTORY:  Preoperative.    COMPARISON: None.    RIGHT CAROTID FINDINGS:  Shadowing plaque at the carotid bulb.  Right ICA PSV:  119  cm/sec.  Right ICA EDV:  27 cm/sec.  Right ICA/CCA PSV Ratio:  1.1    These indicate less than 50% diameter stenosis of the right ICA.    Right Vertebral: Antegrade flow.   Right ECA: Antegrade flow.     LEFT CAROTID FINDINGS:  Plaque at the common carotid and carotid bulb.  Left ICA PSV:  126  cm/sec.  Left ICA EDV:  21 cm/sec.  Left ICA/CCA PSV Ratio:  1.2    These indicate less than 50% diameter stenosis of the left ICA.    Left Vertebral: Antegrade flow.   Left ECA: Antegrade flow.     Causes of Decreased Accuracy:   None.       Impression    IMPRESSION:    1. Less than 50% diameter stenosis of the right ICA relative to the  distal ICA diameter.   2. Less than 50% diameter stenosis of the left ICA relative to the  distal ICA diameter.     ALICE JUÁREZ DO   US Lower Extremity Venous Mapping Bilateral    Narrative    ULTRASOUND LOWER EXTREMITY VENOUS MAPPING BILATERAL 3/8/2019 10:08 AM    DATE OF PROCEDURE: 3/8/2019 10:08 AM    CLINICAL HISTORY/INDICATION:  Preoperative.    FINDINGS/     Impression    IMPRESSION:   1. Right great saphenous vein in the thigh ranges from 4.6 mm to 5.8  mm.  2. Right great saphenous vein at and below the knee ranges from 2.7 mm  to 3.8 mm.  3. Left great saphenous vein in the thigh ranges from 2.8 mm to 5.6  mm.  4. Left great saphenous vein at and below the knee ranges from 3.2 mm  to 3.5 mm.    ALICE JUÁREZ, DO   XR Ankle Right G/E 3 Views    Narrative    ANKLE RIGHT THREE OR MORE VIEWS  3/10/2019 1:11 PM     HISTORY: Ankle pain    COMPARISON: None.      Impression    IMPRESSION: No bony abnormality. Mild soft tissue swelling medially.    TREMAINE THORNE MD   CBC with platelets differential   Result Value Ref Range    WBC 12.8 (H) 4.0 - 11.0 10e9/L    RBC Count 5.02 4.4 - 5.9 10e12/L    Hemoglobin 15.7 13.3 - 17.7 g/dL    Hematocrit 45.2 40.0 - 53.0 %    MCV 90 78 - 100 fl    MCH 31.3 26.5 - 33.0 pg    MCHC 34.7 31.5 - 36.5 g/dL    RDW 13.3 10.0 - 15.0 %    Platelet Count 361 150 - 450 10e9/L    Diff Method Automated Method     % Neutrophils 51.6 %    % Lymphocytes 35.1 %    % Monocytes 9.6 %    % Eosinophils 2.7 %    % Basophils 0.5 %    % Immature Granulocytes 0.5 %    Nucleated RBCs 0 0 /100    Absolute Neutrophil 6.6 1.6 - 8.3 10e9/L    Absolute Lymphocytes 4.5 0.8 - 5.3 10e9/L    Absolute Monocytes 1.2 0.0 - 1.3 10e9/L    Absolute Eosinophils 0.4 0.0 - 0.7 10e9/L    Absolute Basophils 0.1 0.0 - 0.2 10e9/L    Abs Immature Granulocytes 0.1 0 - 0.4 10e9/L    Absolute Nucleated RBC 0.0    Basic metabolic panel   Result Value Ref Range    Sodium 135 133 - 144 mmol/L    Potassium 2.6 (LL) 3.4 - 5.3 mmol/L    Chloride 97 94 - 109 mmol/L    Carbon Dioxide 28 20 - 32 mmol/L    Anion Gap 10 3 - 14 mmol/L    Glucose 130 (H) 70 - 99 mg/dL    Urea Nitrogen 19 7 - 30 mg/dL    Creatinine 1.17 0.66 - 1.25 mg/dL    GFR Estimate 73 >60 mL/min/[1.73_m2]    GFR Estimate If Black 85 >60 mL/min/[1.73_m2]    Calcium 8.6 8.5 - 10.1 mg/dL   Troponin I   Result Value Ref Range     Troponin I ES 0.036 0.000 - 0.045 ug/L   Troponin I (now)   Result Value Ref Range    Troponin I ES 0.255 (HH) 0.000 - 0.045 ug/L   Magnesium   Result Value Ref Range    Magnesium 2.2 1.6 - 2.3 mg/dL   Basic metabolic panel   Result Value Ref Range    Sodium 137 133 - 144 mmol/L    Potassium 3.3 (L) 3.4 - 5.3 mmol/L    Chloride 99 94 - 109 mmol/L    Carbon Dioxide 31 20 - 32 mmol/L    Anion Gap 7 3 - 14 mmol/L    Glucose 97 70 - 99 mg/dL    Urea Nitrogen 17 7 - 30 mg/dL    Creatinine 1.17 0.66 - 1.25 mg/dL    GFR Estimate 73 >60 mL/min/[1.73_m2]    GFR Estimate If Black 85 >60 mL/min/[1.73_m2]    Calcium 8.7 8.5 - 10.1 mg/dL   Platelet count   Result Value Ref Range    Platelet Count 315 150 - 450 10e9/L   Troponin I   Result Value Ref Range    Troponin I ES 1.771 (HH) 0.000 - 0.045 ug/L   Hemoglobin A1c   Result Value Ref Range    Hemoglobin A1C 6.3 (H) 0 - 5.6 %   Lipid Profile   Result Value Ref Range    Cholesterol 229 (H) <200 mg/dL    Triglycerides 201 (H) <150 mg/dL    HDL Cholesterol 37 (L) >39 mg/dL    LDL Cholesterol Calculated 152 (H) <100 mg/dL    Non HDL Cholesterol 192 (H) <130 mg/dL   Potassium   Result Value Ref Range    Potassium 3.4 3.4 - 5.3 mmol/L   Heparin Xa level (AM Draw)   Result Value Ref Range    Heparin 10A Level 0.24 IU/mL   UA with Microscopic reflex to Culture   Result Value Ref Range    Color Urine Yellow     Appearance Urine Clear     Glucose Urine Negative NEG^Negative mg/dL    Bilirubin Urine Negative NEG^Negative    Ketones Urine Negative NEG^Negative mg/dL    Specific Gravity Urine 1.035 1.003 - 1.035    Blood Urine Negative NEG^Negative    pH Urine 6.0 5.0 - 7.0 pH    Protein Albumin Urine 30 (A) NEG^Negative mg/dL    Urobilinogen mg/dL Normal 0.0 - 2.0 mg/dL    Nitrite Urine Negative NEG^Negative    Leukocyte Esterase Urine Negative NEG^Negative    Source UCC     WBC Urine 1 0 - 5 /HPF    RBC Urine 1 0 - 2 /HPF    Squamous Epithelial /HPF Urine <1 0 - 1 /HPF    Mucous Urine  Present (A) NEG^Negative /LPF   Hepatic panel   Result Value Ref Range    Bilirubin Direct <0.1 0.0 - 0.2 mg/dL    Bilirubin Total 0.6 0.2 - 1.3 mg/dL    Albumin 3.2 (L) 3.4 - 5.0 g/dL    Protein Total 6.4 (L) 6.8 - 8.8 g/dL    Alkaline Phosphatase 81 40 - 150 U/L    ALT 54 0 - 70 U/L    AST 45 0 - 45 U/L   CBC with platelets   Result Value Ref Range    WBC 8.8 4.0 - 11.0 10e9/L    RBC Count 4.52 4.4 - 5.9 10e12/L    Hemoglobin 14.0 13.3 - 17.7 g/dL    Hematocrit 41.5 40.0 - 53.0 %    MCV 92 78 - 100 fl    MCH 31.0 26.5 - 33.0 pg    MCHC 33.7 31.5 - 36.5 g/dL    RDW 13.8 10.0 - 15.0 %    Platelet Count 288 150 - 450 10e9/L   D dimer quantitative   Result Value Ref Range    D Dimer 0.5 0.0 - 0.50 ug/ml FEU   Potassium   Result Value Ref Range    Potassium 3.4 3.4 - 5.3 mmol/L   Heparin Xa (10a) Level   Result Value Ref Range    Heparin 10A Level 0.18 IU/mL   Heparin Xa (10a) Level   Result Value Ref Range    Heparin 10A Level 0.18 IU/mL   Potassium   Result Value Ref Range    Potassium 3.6 3.4 - 5.3 mmol/L   Creatinine   Result Value Ref Range    Creatinine 1.04 0.66 - 1.25 mg/dL    GFR Estimate 85 >60 mL/min/[1.73_m2]    GFR Estimate If Black >90 >60 mL/min/[1.73_m2]   Heparin Xa (10a) Level   Result Value Ref Range    Heparin 10A Level 0.12 IU/mL   CBC (AM Draw)   Result Value Ref Range    WBC 9.6 4.0 - 11.0 10e9/L    RBC Count 4.27 (L) 4.4 - 5.9 10e12/L    Hemoglobin 13.2 (L) 13.3 - 17.7 g/dL    Hematocrit 39.2 (L) 40.0 - 53.0 %    MCV 92 78 - 100 fl    MCH 30.9 26.5 - 33.0 pg    MCHC 33.7 31.5 - 36.5 g/dL    RDW 13.5 10.0 - 15.0 %    Platelet Count 259 150 - 450 10e9/L   Heparin 10a Level   Result Value Ref Range    Heparin 10A Level 0.21 IU/mL   Heparin Xa (10a) Level   Result Value Ref Range    Heparin 10A Level 0.17 IU/mL   Comprehensive metabolic panel   Result Value Ref Range    Sodium 139 133 - 144 mmol/L    Potassium 3.3 (L) 3.4 - 5.3 mmol/L    Chloride 106 94 - 109 mmol/L    Carbon Dioxide 26 20 - 32  mmol/L    Anion Gap 7 3 - 14 mmol/L    Glucose 94 70 - 99 mg/dL    Urea Nitrogen 15 7 - 30 mg/dL    Creatinine 0.98 0.66 - 1.25 mg/dL    GFR Estimate >90 >60 mL/min/[1.73_m2]    GFR Estimate If Black >90 >60 mL/min/[1.73_m2]    Calcium 8.4 (L) 8.5 - 10.1 mg/dL    Bilirubin Total 0.7 0.2 - 1.3 mg/dL    Albumin 3.0 (L) 3.4 - 5.0 g/dL    Protein Total 6.5 (L) 6.8 - 8.8 g/dL    Alkaline Phosphatase 98 40 - 150 U/L    ALT 65 0 - 70 U/L    AST 33 0 - 45 U/L   EKG 12-lead, tracing only   Result Value Ref Range    Interpretation ECG Click View Image link to view waveform and result    EKG 12-lead, tracing only   Result Value Ref Range    Interpretation ECG Click View Image link to view waveform and result    ISTAT gases venous POCT   Result Value Ref Range    Ph Venous 7.40 7.32 - 7.43 pH    PCO2 Venous 38 (L) 40 - 50 mm Hg    PO2 Venous 32 25 - 47 mm Hg    Bicarbonate Venous 24 21 - 28 mmol/L    O2 Sat Venous 61 %   Echocardiogram Complete    Narrative    [id number]  [id number]  [id number]  [id number]^BRIGITTE^JOSEPHINE^D           [facility]  Echocardiography Laboratory  [address] [place] [address]        Name: SALOMON DELGADO  MRN: [id number]  : 1970  Study Date: 2019 08:07 AM  Age: 48 yrs  Gender: Male  Patient Location: [facility]  Reason For Study: Chest Pain  Ordering Physician: JOSEPHINE BRIGGS  Referring Physician: DAVID NAPOLES  Performed By: Stephanie Sosa RDCS     BSA: 2.7 m2  Height: 75 in  Weight: 326 lb  HR: 63  BP: 160/91 mmHg  _____________________________________________________________________________  __        Procedure  Complete Echo Adult. Contrast Optison.  _____________________________________________________________________________  __        Interpretation Summary     Technically difficult study.  The visual ejection fraction is estimated at 55-60%.  Normal left ventricular wall motion  Dilated inferior vena cava  Borderline/mild aortic root  dilatation.  _____________________________________________________________________________  __        Left Ventricle  The left ventricle is normal in size. The visual ejection fraction is  estimated at 55-60%. Diastolic Doppler findings (E/E' ratio and/or other  parameters) suggest left ventricular filling pressures are indeterminate.  Normal left ventricular wall motion.     Right Ventricle  The right ventricle is not well visualized. RV was hard to see but on  subcoastal images, function looks reasonable.     Atria  Normal left atrial size. Right atrial size is normal.     Mitral Valve  There is trace mitral regurgitation.        Tricuspid Valve  There is trace tricuspid regurgitation. Right ventricular systolic pressure  could not be approximated due to inadequate tricuspid regurgitation.     Aortic Valve  The aortic valve is not well visualized. No aortic regurgitation is present.  No hemodynamically significant valvular aortic stenosis.     Pulmonic Valve  The pulmonic valve is not well visualized.     Vessels  Borderline aortic root dilatation. Dilated inferior vena cava.     Pericardium  There is no pericardial effusion.     _____________________________________________________________________________  __  MMode/2D Measurements & Calculations  IVSd: 0.87 cm  LVIDd: 5.2 cm  LVIDs: 2.9 cm  LVPWd: 1.0 cm  FS: 43.8 %  LV mass(C)d: 180.5 grams  LV mass(C)dI: 66.8 grams/m2     Ao root diam: 4.0 cm  LA dimension: 3.9 cm  asc Aorta Diam: 3.3 cm  LA/Ao: 0.97  LVOT diam: 2.7 cm  LVOT area: 5.8 cm2  LA Volume (BP): 46.6 ml  LA Volume Index (BP): 17.3 ml/m2  RWT: 0.41        Doppler Measurements & Calculations  MV E max magda: 82.9 cm/sec  MV A max magda: 65.0 cm/sec  MV E/A: 1.3  MV dec time: 0.24 sec     E/E' av.2  Lateral E/e': 6.8  Medial E/e': 11.6           _____________________________________________________________________________  __           Report approved by: Charity Wilcox 2019 09:17 AM       ABO/Rh type and screen   Result Value Ref Range    ABO A     RH(D) Neg     Antibody Screen Neg     Test Valid Only At [facility]        Specimen Expires 03/10/2019     Blood Bank Comment PREADMIT ORDER RECEIVED IN BLOOD BANK    Cardiac Catheterization    Narrative    ?Mr. Edilberto Brooks is 48 years old with history of obesity,  hyperlipidemia and family history of CAD, who was admitted with  discomfort and an abnormal troponin level to 1.8.  He is referred for  coronary angiogram    SUMMARY OF PROCEDURES:  1) Coronary angiogram  2) [facility]    Procedure note:  6F slender sheath in the left radial artery. 2.5  Verapamil and 2 nitroglycerin given through the side arm of the sheath  and 5000U of heparin given. Conscience sedation was used throughout  the procedure with 3mg Versed and 150mcg Fentanyl given over 30  minutes under my supervision. There were no complications. 6F JL4  diagnostic catheter was used; RCA occluded at the ostium.  I assessed  him just prior to the procedure and felt he was an acceptable  candidate for moderate sedation.     SUMMARY OF CORONARY ANGIOGRAM:  1) Normal left main  2) LAD is a large vessel that has 90% ostial disease and 80% tubular  disease. Mid and distal LAD are good targets.  LAD gives rise to D1  with 80% disease and good distal target  3) Circumflex is large and dominant. Gives rise to OM1 that has 70%  ostial followed by 60% discrete lesion and no significant disease  distally. OM2 without significant disease. L-PDA with 99% disease and  L-L collaterals. There is a good distal target.   4) RCA is occluded at the ostium with L-R collaterals. It is small in  size.    SUMMARY OF [facility]:  1) LVEDP 16 mmHg, no significant gradient on pull-back    PLAN OF CARE:  Consult for 4-vessel CABG (LIMA-LAD, Vein-D1, Vein-OM1, Vein-L-PDA)    JASE CHRISTIANSEN MD   Methicillin Resistant Staph Aureus PCR   Result Value Ref Range    Specimen Description Nares     Methicillin  Resist/Sens S. aureus PCR Negative NEG^Negative          Attestation:  I have reviewed today's vital signs, notes, medications, labs and imaging with Dr. Evangelista.  Amount of time performed on this consult: 20 minutes.    Nohemi Prieto PA-C

## 2019-03-11 NOTE — PROGRESS NOTES
[facility]    Hospitalist Progress Note      Assessment & Plan   Edilberto Brooks is a 48 year old male with hx of HTN, who presented to the emergency room with anterior chest pressure. He is noted to have elevated troponin and admitted for NSTEMI.  Cardiac cath revealed multivessel disease and awaiting CABG      Non-ST-elevation myocardial infarction   - Tni 0.036--->0.255---1.771  - EKG with NSR with PVCs. TTE showed EF of 55-60%, no WMA  - coronary angiogram on 3/7 reveals multivessel disease  - Continue aspirin, heparin drip, Metoprolol, Losartan, lipitor  - plan for CABG on Tuesday 3/12  - cardiology and CV surgery following, appreciate help     Probable acute bronchospasm   - no prior diagnosis of asthma or COPD; states remote hx of second hand smoke exposure, and states he does not smoke.  likely URI leading to broncospasm improved with nebs  - Chest x-ray was negative for pneumonia.     Hypertension   - Continue amlodipine and losartan.  - Hold Dyazide due to the hypokalemia      # Hyperlipidemia   - Lipitor as above    Suspected Right ankle gout.   Patient with ankle pain and swelling. No erythema around the joint. Stared on colchicine on 3/10. Pain feels slightly better. Ortho evaluated and felt likely gout.   - given drug interaction with statin and colchicine. Stopped colchicine  - will use short course of Prednisone     DVT Prophylaxis: Heparin gtt  Code Status: Full Code    Disposition: Expected discharge pending surgery and post-op course    Violeta Dennis MD  Text Page  (7am to 6pm)    Interval History   Complains of right ankle pain. Feels it is slightly better.   Denies chest pain or shortness of breath.     -Data reviewed today: I reviewed all new labs and imaging results over the last 24 hours. I personally reviewed no images or EKG's today.    Physical Exam   Temp: 98.2  F (36.8  C) Temp src: Oral BP: 150/74 Pulse: 73 Heart Rate: 75 Resp: 16 SpO2: 96 % O2 Device: None (Room air)     Vitals:    03/08/19 0638 03/09/19 0556 03/11/19 0659   Weight: 147.9 kg (326 lb 1.6 oz) 148.1 kg (326 lb 8 oz) 147.2 kg (324 lb 9.6 oz)     Vital Signs with Ranges  Temp:  [98.2  F (36.8  C)-99.1  F (37.3  C)] 98.2  F (36.8  C)  Pulse:  [71-79] 73  Heart Rate:  [75] 75  Resp:  [16-18] 16  BP: (121-151)/(73-83) 150/74  SpO2:  [94 %-97 %] 96 %  I/O last 3 completed shifts:  In: 1190 [P.O.:1190]  Out: 350 [Urine:350]    Constitutional: Alert, awake and no apparent distress  Respiratory: Clear to auscultation bialterally  Cardiovascular: regular rate and rhythm  GI: soft and non tender  Skin/Integumen: warm, psoriatic plaques noted on arm and legs  Other:right ankle with some effusion, no erythema, some pain with dorsi and plantar flexion    Medications     HEParin 1,500 Units/hr (03/11/19 0728)     - MEDICATION INSTRUCTIONS -         amLODIPine  5 mg Oral Daily     aspirin  81 mg Oral Daily     atorvastatin  80 mg Oral QPM     levothyroxine  50 mcg Oral Daily     losartan  25 mg Oral Daily     metoprolol succinate ER  25 mg Oral Daily     mupirocin   Both Nostrils BID     predniSONE  40 mg Oral Daily     [START ON 3/12/2019] ranitidine  150 mg Oral Once     senna-docusate  1 tablet Oral BID    Or     senna-docusate  2 tablet Oral BID     sodium chloride (PF)  3 mL Intracatheter Q8H       Data   Recent Labs   Lab 03/11/19  0607 03/10/19  0615 03/09/19  0552 03/08/19  0533 03/07/19  2112 03/07/19  1306 03/07/19  0539 03/06/19  2138 03/06/19 2017   WBC  --  9.6  --   --  8.8  --   --   --  12.8*   HGB  --  13.2*  --   --  14.0  --   --   --  15.7   MCV  --  92  --   --  92  --   --   --  90   PLT  --  259  --   --  288  --  315  --  361     --   --   --   --   --  137  --  135   POTASSIUM 3.3*  --  3.6 3.4  --  3.4 3.3*  --  2.6*   CHLORIDE 106  --   --   --   --   --  99  --  97   CO2 26  --   --   --   --   --  31  --  28   BUN 15  --   --   --   --   --  17  --  19   CR 0.98 1.04  --   --   --   --  1.17  --   1.17   ANIONGAP 7  --   --   --   --   --  7  --  10   KAREN 8.4*  --   --   --   --   --  8.7  --  8.6   GLC 94  --   --   --   --   --  97  --  130*   ALBUMIN 3.0*  --   --   --   --  3.2*  --   --   --    PROTTOTAL 6.5*  --   --   --   --  6.4*  --   --   --    BILITOTAL 0.7  --   --   --   --  0.6  --   --   --    ALKPHOS 98  --   --   --   --  81  --   --   --    ALT 65  --   --   --   --  54  --   --   --    AST 33  --   --   --   --  45  --   --   --    TROPI  --   --   --   --   --   --  1.771* 0.255* 0.036       Recent Results (from the past 24 hour(s))   XR Ankle Right G/E 3 Views    Narrative    ANKLE RIGHT THREE OR MORE VIEWS  3/10/2019 1:11 PM     HISTORY: Ankle pain    COMPARISON: None.      Impression    IMPRESSION: No bony abnormality. Mild soft tissue swelling medially.    TREMAINE THORNE MD

## 2019-03-11 NOTE — CONSULTS
Care Transition Initial Assessment -      Met with: Family  (sister Karla)  Active Problems:    NSTEMI (non-ST elevated myocardial infarction) (H)       DATA  Lives With: alone      Quality of Family Relationships: involved, supportive  Description of Support System: Supportive, Involved  Who is your support system?: Sibling(s)(Sister Karla)  Support Assessment: Adequate family and caregiver support.   Identified issues/concerns regarding health management:      Quality of Family Relationships: involved, supportive     Per care transitions consult for discharge planning and TCU placement on discharge.  Patient was admitted on 3-6-19 with a n stemi.  The tentative date of discharge is yet to be determined.  Reviewed chart and spoke with patient's sister Karla regarding discharge plans.  Per patient's sister's report, patient lives alone in an apartment.  Patient is independent at baseline.  Patient works outside the home.  Patient's sister is concerned about discharge plans for patient and feels as if he will need to go to tcu on discharge.  Explained that we can assist with this and if she has a certain facility in mind we can certainly send the referral and place the patient on a waiting list.  Explained that due to patient's age he may not qualify to go to tcu.  Explained that he has not had therapy yet, we will need to wait until after surgery.  Explained that we need to have a MD recommendation for tcu as well as a therapy recommendation for tcu.  Also explained that with patient's insurance they will need to give pre-auth.  They will be unable to do that without therapy notes/recommendations.  Patient's sister states she understands.  Patient's sister is asking for a referral to be sent to [facility].  She is asking for a private room.  Explained that there is a $39 per day amenity fee.  Patient's sister is in agreement.  Patient's sister states that they would accept a double room and be placed on the waiting list  for a private room just to go to [facility] if that is the only option.  Patient's sister is also concerned about depression in patient she would like a psych consult.  She feels that patient is not honest when people ask how he is doing.  Patient's sister feels that patient can cover well and a good psychiatrist can figure this out.  Explained that it is too late in the day to get a psych consult and tomorrow he is having surgery and the next day he will likely be intubated or just being removed from the vent so I can ask for a psych consult, when appropriate.  Patient's sister is in agreement.  Patient's sister is also concerned about pain meds.  She states that patient seems to follow the same patterns as their mother and she has a possible morphine allergy, but she tolerates dilaudid.  Explained that I would also pass this alone.  Explained that we would follow up from a discharge planning standpoint the closer we get to discharge.  Patient's sister is in agreement.    ASSESSMENT  Cognitive Status:  Did not meet patient, spoke with his sister on the phone  Concerns to be addressed: discharge planning, tcu placement on discharge.    PLAN  Financial costs for the patient includes private room amenity fees.  Patient given options and choices for discharge TCU choices.  Patient/family is agreeable to the plan?  Yes  Patient Goals and Preferences: TCU on discharge.  Patient anticipates discharging to:  TCU.    Will confirm a bed, continue to follow, and assist with a safe discharge plan.    PORFIRIO Ramirez, [facility]  [phone]

## 2019-03-11 NOTE — PLAN OF CARE
PLAN: [facility] Tuesday. Orthopedic surgery to see for right ankle pain. Ankle Xray showed no acute abnormality.     CNS: oriented X 4, 5/10 pain to right ankle--> 10/10 with movement. Avoiding walking due to pain. Tylenol PRN, colchicine started which has helped to reduce pain to 5/10 with movement.   CVS: SR.   RESP: RA. Lungs diminished.   GI: cardiac diet.   : Voided X 2 today, ashia output.   SKIN: right radial site CDI, no hematoma, no ooze.  IV: heparin 1500 units/h, next Xa in AM.   FAMILY: father/sister in to visit.

## 2019-03-11 NOTE — PLAN OF CARE
Full code. SR 60s-70s. C/o 3/10 R ankle pain, new gout medication started 3/10, 650 tylenol given. Angio L radial site stable. Plan 3v CABG Tuesday 3/12, checklist in progress. Heparin gtt. Up independent. A&O x4, pleasant and cooperative. Continue to monitor.

## 2019-03-11 NOTE — PROGRESS NOTES
S: Pt. seen in [facility]. Male. Conner DOTY. RX: Right short CAM boot. DX: Gout flare up.  O: Pt. had Heart surgery last week and developed gout to right foot ankle complex.   A: Today I F/D a right short size LG. Maxtrax Walker. Orthosis to provide limited ROM to foot/ankle complex. Donning doffing wear and care instructions given.  P: Pt. to be seen as needed.    Raymond PATEL,RELL

## 2019-03-11 NOTE — PROGRESS NOTES
[facility]  Cardiology Progress Note    Date of Service (when I saw the patient): 03/11/2019     Assessment & Plan   Edilberto Brooks is a 48 year old male who was admitted on 3/6/2019 with chest pain. He carries a PMH significant for family hx of CAD, remote smoker, hypertension, obesity and hyperlipidemia    1.  : NSTEMI  -Troponin peaked 1.77  - Angiogram demonstrated MVD  - CVS consulted for 4- vessel CABG scheduled for 3/12/19  - Currently CP free  - Tele stable  - Potassium 3.3, replace per protocol  - Heparin gtt, aspirin, metoprolol, losartan, amlodipine and statin    2.  : Hypertension  -Managed on BB, CCB, and ARB  - Creatinine stable    3.  : Hyperlipidemia  -On statin    KEATON Smith CNP  Text Page  (M-F, 7:30 am - 4:00 pm)    Interval History   No cardiac complaints, denies CP, shortness of breath, palpitation, PND, orthopnea, presyncope or syncope. Only complaint is right foot pain r/t gout. CABG scheduled for AM.     Physical Exam   Temp: 98.2  F (36.8  C) Temp src: Oral BP: 150/74 Pulse: 73 Heart Rate: 75 Resp: 16 SpO2: 96 % O2 Device: None (Room air)    Vitals:    03/08/19 0638 03/09/19 0556 03/11/19 0659   Weight: 147.9 kg (326 lb 1.6 oz) 148.1 kg (326 lb 8 oz) 147.2 kg (324 lb 9.6 oz)     Vital Signs with Ranges  Temp:  [98.2  F (36.8  C)-99.1  F (37.3  C)] 98.2  F (36.8  C)  Pulse:  [71-79] 73  Heart Rate:  [75] 75  Resp:  [16-18] 16  BP: (121-151)/(73-83) 150/74  SpO2:  [94 %-97 %] 96 %  I/O last 3 completed shifts:  In: 1190 [P.O.:1190]  Out: 350 [Urine:350]    GEN:  In general, this is an obese male,  in no acute distress .  HEENT:  Pupils equal, round. Sclerae nonicteric. Clear oropharynx. Mucous membranes moist.  NECK: Supple, no masses appreciated. Trachea midline. No JVD with patient .  C/V:  Regular rate and rhythm, no murmur, rub or gallop. No S3 or RV heave.   RESP: Respirations are unlabored. No use of accessory muscles. Clear to auscultation bilaterally without  wheezing, rales, or rhonchi.  GI: Abdomen soft, nontender, nondistended. No HSM appreciated.   EXTREM: Trace LE edema. No cyanosis or clubbing.  NEURO: Alert and oriented, cooperative.  No obvious focal deficits.   PSYCH: Normal affect.  SKIN: Warm and dry. No rashes or petechiae appreciated.       Medications     HEParin 1,500 Units/hr (03/11/19 0784)     - MEDICATION INSTRUCTIONS -         amLODIPine  5 mg Oral Daily     aspirin  81 mg Oral Daily     atorvastatin  80 mg Oral QPM     levothyroxine  50 mcg Oral Daily     losartan  25 mg Oral Daily     metoprolol succinate ER  25 mg Oral Daily     mupirocin   Both Nostrils BID     predniSONE  40 mg Oral Daily     [START ON 3/12/2019] ranitidine  150 mg Oral Once     senna-docusate  1 tablet Oral BID    Or     senna-docusate  2 tablet Oral BID     sodium chloride (PF)  3 mL Intracatheter Q8H       Data   Reviewed       Vidya Blackmon, KEATON CNP 3/11/2019

## 2019-03-12 ENCOUNTER — ANESTHESIA (OUTPATIENT)
Dept: SURGERY | Facility: CLINIC | Age: 49
DRG: 234 | End: 2019-03-12
Payer: COMMERCIAL

## 2019-03-12 ENCOUNTER — ANESTHESIA EVENT (OUTPATIENT)
Dept: SURGERY | Facility: CLINIC | Age: 49
DRG: 234 | End: 2019-03-12
Payer: COMMERCIAL

## 2019-03-12 ENCOUNTER — APPOINTMENT (OUTPATIENT)
Dept: GENERAL RADIOLOGY | Facility: CLINIC | Age: 49
DRG: 234 | End: 2019-03-12
Attending: STUDENT IN AN ORGANIZED HEALTH CARE EDUCATION/TRAINING PROGRAM
Payer: COMMERCIAL

## 2019-03-12 PROBLEM — I25.10 CAD IN NATIVE ARTERY: Status: ACTIVE | Noted: 2019-03-12

## 2019-03-12 LAB
ALBUMIN SERPL-MCNC: 2.7 G/DL (ref 3.4–5)
ALBUMIN SERPL-MCNC: 2.8 G/DL (ref 3.4–5)
ALP SERPL-CCNC: 74 U/L (ref 40–150)
ALP SERPL-CCNC: 76 U/L (ref 40–150)
ALT SERPL W P-5'-P-CCNC: 50 U/L (ref 0–70)
ALT SERPL W P-5'-P-CCNC: 54 U/L (ref 0–70)
ANION GAP SERPL CALCULATED.3IONS-SCNC: 6 MMOL/L (ref 3–14)
ANION GAP SERPL CALCULATED.3IONS-SCNC: 8 MMOL/L (ref 3–14)
APTT PPP: 32 SEC (ref 22–37)
APTT PPP: 37 SEC (ref 22–37)
AST SERPL W P-5'-P-CCNC: 43 U/L (ref 0–45)
AST SERPL W P-5'-P-CCNC: 57 U/L (ref 0–45)
BASE DEFICIT BLDA-SCNC: 1.6 MMOL/L
BILIRUB SERPL-MCNC: 0.5 MG/DL (ref 0.2–1.3)
BILIRUB SERPL-MCNC: 0.6 MG/DL (ref 0.2–1.3)
BLD PROD TYP BPU: NORMAL
BLD PROD TYP BPU: NORMAL
BLD UNIT ID BPU: 0
BLD UNIT ID BPU: 0
BLOOD PRODUCT CODE: NORMAL
BLOOD PRODUCT CODE: NORMAL
BPU ID: NORMAL
BPU ID: NORMAL
BUN SERPL-MCNC: 18 MG/DL (ref 7–30)
BUN SERPL-MCNC: 20 MG/DL (ref 7–30)
CA-I BLD-MCNC: 4.9 MG/DL (ref 4.4–5.2)
CA-I BLD-SCNC: 4.5 MG/DL (ref 4.4–5.2)
CA-I BLD-SCNC: 4.9 MG/DL (ref 4.4–5.2)
CA-I BLD-SCNC: 5.9 MG/DL (ref 4.4–5.2)
CA-I BLD-SCNC: 6.3 MG/DL (ref 4.4–5.2)
CALCIUM SERPL-MCNC: 8.4 MG/DL (ref 8.5–10.1)
CALCIUM SERPL-MCNC: 9.7 MG/DL (ref 8.5–10.1)
CHLORIDE SERPL-SCNC: 113 MMOL/L (ref 94–109)
CHLORIDE SERPL-SCNC: 114 MMOL/L (ref 94–109)
CO2 BLD-SCNC: 22 MMOL/L (ref 21–28)
CO2 BLD-SCNC: 23 MMOL/L (ref 21–28)
CO2 BLD-SCNC: 24 MMOL/L (ref 21–28)
CO2 BLD-SCNC: 26 MMOL/L (ref 21–28)
CO2 BLD-SCNC: 27 MMOL/L (ref 21–28)
CO2 BLDCOV-SCNC: 24 MMOL/L (ref 21–28)
CO2 SERPL-SCNC: 23 MMOL/L (ref 20–32)
CO2 SERPL-SCNC: 23 MMOL/L (ref 20–32)
CPB APPLIED: ABNORMAL
CREAT SERPL-MCNC: 0.98 MG/DL (ref 0.66–1.25)
CREAT SERPL-MCNC: 1.01 MG/DL (ref 0.66–1.25)
ERYTHROCYTE [DISTWIDTH] IN BLOOD BY AUTOMATED COUNT: 13.6 % (ref 10–15)
ERYTHROCYTE [DISTWIDTH] IN BLOOD BY AUTOMATED COUNT: 13.7 % (ref 10–15)
FIBRINOGEN PPP-MCNC: 466 MG/DL (ref 200–420)
GFR SERPL CREATININE-BSD FRML MDRD: 88 ML/MIN/{1.73_M2}
GFR SERPL CREATININE-BSD FRML MDRD: >90 ML/MIN/{1.73_M2}
GLUCOSE BLDC GLUCOMTR-MCNC: 113 MG/DL (ref 70–99)
GLUCOSE BLDC GLUCOMTR-MCNC: 123 MG/DL (ref 70–99)
GLUCOSE BLDC GLUCOMTR-MCNC: 129 MG/DL (ref 70–99)
GLUCOSE BLDC GLUCOMTR-MCNC: 130 MG/DL (ref 70–99)
GLUCOSE BLDC GLUCOMTR-MCNC: 136 MG/DL (ref 70–99)
GLUCOSE BLDC GLUCOMTR-MCNC: 138 MG/DL (ref 70–99)
GLUCOSE BLDC GLUCOMTR-MCNC: 141 MG/DL (ref 70–99)
GLUCOSE BLDC GLUCOMTR-MCNC: 157 MG/DL (ref 70–99)
GLUCOSE BLDC GLUCOMTR-MCNC: 96 MG/DL (ref 70–99)
GLUCOSE SERPL-MCNC: 136 MG/DL (ref 70–99)
GLUCOSE SERPL-MCNC: 147 MG/DL (ref 70–99)
HCO3 BLD-SCNC: 25 MMOL/L (ref 21–28)
HCT VFR BLD AUTO: 34.2 % (ref 40–53)
HCT VFR BLD AUTO: 37.3 % (ref 40–53)
HCT VFR BLD CALC: 31 %PCV (ref 40–53)
HCT VFR BLD CALC: 32 %PCV (ref 40–53)
HCT VFR BLD CALC: 33 %PCV (ref 40–53)
HCT VFR BLD CALC: 35 %PCV (ref 40–53)
HCT VFR BLD CALC: 38 %PCV (ref 40–53)
HGB BLD CALC-MCNC: 10.5 G/DL (ref 13.3–17.7)
HGB BLD CALC-MCNC: 10.9 G/DL (ref 13.3–17.7)
HGB BLD CALC-MCNC: 11.2 G/DL (ref 13.3–17.7)
HGB BLD CALC-MCNC: 11.9 G/DL (ref 13.3–17.7)
HGB BLD CALC-MCNC: 12.9 G/DL (ref 13.3–17.7)
HGB BLD-MCNC: 11.1 G/DL (ref 13.3–17.7)
HGB BLD-MCNC: 12.4 G/DL (ref 13.3–17.7)
INR PPP: 1.24 (ref 0.86–1.14)
INR PPP: 1.63 (ref 0.86–1.14)
LACTATE BLD-SCNC: 0.5 MMOL/L (ref 0.7–2.1)
LACTATE BLD-SCNC: 0.6 MMOL/L (ref 0.7–2.1)
LACTATE BLD-SCNC: 0.7 MMOL/L (ref 0.7–2.1)
LACTATE BLD-SCNC: 0.7 MMOL/L (ref 0.7–2.1)
LACTATE BLD-SCNC: 1.1 MMOL/L (ref 0.7–2)
LACTATE BLD-SCNC: 1.8 MMOL/L (ref 0.7–2.1)
MAGNESIUM SERPL-MCNC: 2.8 MG/DL (ref 1.6–2.3)
MCH RBC QN AUTO: 30.3 PG (ref 26.5–33)
MCH RBC QN AUTO: 30.8 PG (ref 26.5–33)
MCHC RBC AUTO-ENTMCNC: 32.5 G/DL (ref 31.5–36.5)
MCHC RBC AUTO-ENTMCNC: 33.2 G/DL (ref 31.5–36.5)
MCV RBC AUTO: 93 FL (ref 78–100)
MCV RBC AUTO: 93 FL (ref 78–100)
O2/TOTAL GAS SETTING VFR VENT: ABNORMAL %
OXYHGB MFR BLD: 95 % (ref 92–100)
PCO2 BLD: 42 MM HG (ref 35–45)
PCO2 BLD: 43 MM HG (ref 35–45)
PCO2 BLD: 44 MM HG (ref 35–45)
PCO2 BLD: 45 MM HG (ref 35–45)
PCO2 BLD: 46 MM HG (ref 35–45)
PCO2 BLD: 46 MM HG (ref 35–45)
PCO2 BLD: 47 MM HG (ref 35–45)
PCO2 BLDV: 43 MM HG (ref 40–50)
PH BLD: 7.33 PH (ref 7.35–7.45)
PH BLD: 7.34 PH (ref 7.35–7.45)
PH BLD: 7.35 PH (ref 7.35–7.45)
PH BLD: 7.36 PH (ref 7.35–7.45)
PH BLD: 7.37 PH (ref 7.35–7.45)
PH BLD: 7.39 PH (ref 7.35–7.45)
PH BLD: 7.39 PH (ref 7.35–7.45)
PH BLDV: 7.36 PH (ref 7.32–7.43)
PHOSPHATE SERPL-MCNC: 3.7 MG/DL (ref 2.5–4.5)
PLATELET # BLD AUTO: 224 10E9/L (ref 150–450)
PLATELET # BLD AUTO: 239 10E9/L (ref 150–450)
PO2 BLD: 232 MM HG (ref 80–105)
PO2 BLD: 233 MM HG (ref 80–105)
PO2 BLD: 253 MM HG (ref 80–105)
PO2 BLD: 263 MM HG (ref 80–105)
PO2 BLD: 274 MM HG (ref 80–105)
PO2 BLD: 279 MM HG (ref 80–105)
PO2 BLD: 303 MM HG (ref 80–105)
PO2 BLD: 333 MM HG (ref 80–105)
PO2 BLD: 86 MM HG (ref 80–105)
PO2 BLD: 87 MM HG (ref 80–105)
PO2 BLD: 93 MM HG (ref 80–105)
PO2 BLDV: 43 MM HG (ref 25–47)
POTASSIUM BLD-SCNC: 4 MMOL/L (ref 3.4–5.3)
POTASSIUM BLD-SCNC: 4.1 MMOL/L (ref 3.4–5.3)
POTASSIUM BLD-SCNC: 4.1 MMOL/L (ref 3.4–5.3)
POTASSIUM BLD-SCNC: 4.5 MMOL/L (ref 3.4–5.3)
POTASSIUM BLD-SCNC: 4.5 MMOL/L (ref 3.4–5.3)
POTASSIUM BLD-SCNC: 4.6 MMOL/L (ref 3.4–5.3)
POTASSIUM BLD-SCNC: 4.6 MMOL/L (ref 3.4–5.3)
POTASSIUM BLD-SCNC: 4.7 MMOL/L (ref 3.4–5.3)
POTASSIUM SERPL-SCNC: 4.1 MMOL/L (ref 3.4–5.3)
POTASSIUM SERPL-SCNC: 4.2 MMOL/L (ref 3.4–5.3)
POTASSIUM SERPL-SCNC: 5 MMOL/L (ref 3.4–5.3)
PROT SERPL-MCNC: 5.5 G/DL (ref 6.8–8.8)
PROT SERPL-MCNC: 5.8 G/DL (ref 6.8–8.8)
RBC # BLD AUTO: 3.66 10E12/L (ref 4.4–5.9)
RBC # BLD AUTO: 4.03 10E12/L (ref 4.4–5.9)
SAO2 % BLDA FROM PO2: 100 % (ref 92–100)
SAO2 % BLDA FROM PO2: 96 % (ref 92–100)
SAO2 % BLDA FROM PO2: 97 % (ref 92–100)
SAO2 % BLDV FROM PO2: 76 %
SODIUM BLD-SCNC: 138 MMOL/L (ref 133–144)
SODIUM BLD-SCNC: 139 MMOL/L (ref 133–144)
SODIUM BLD-SCNC: 140 MMOL/L (ref 133–144)
SODIUM BLD-SCNC: 140 MMOL/L (ref 133–144)
SODIUM BLD-SCNC: 141 MMOL/L (ref 133–144)
SODIUM BLD-SCNC: 142 MMOL/L (ref 133–144)
SODIUM SERPL-SCNC: 143 MMOL/L (ref 133–144)
SODIUM SERPL-SCNC: 144 MMOL/L (ref 133–144)
TRANSFUSION STATUS PATIENT QL: NORMAL
WBC # BLD AUTO: 16.5 10E9/L (ref 4–11)
WBC # BLD AUTO: 28.8 10E9/L (ref 4–11)

## 2019-03-12 PROCEDURE — 25000132 ZZH RX MED GY IP 250 OP 250 PS 637: Performed by: STUDENT IN AN ORGANIZED HEALTH CARE EDUCATION/TRAINING PROGRAM

## 2019-03-12 PROCEDURE — 85027 COMPLETE CBC AUTOMATED: CPT | Performed by: HOSPITALIST

## 2019-03-12 PROCEDURE — 80053 COMPREHEN METABOLIC PANEL: CPT | Performed by: HOSPITALIST

## 2019-03-12 PROCEDURE — 00000146 ZZHCL STATISTIC GLUCOSE BY METER IP

## 2019-03-12 PROCEDURE — 25000131 ZZH RX MED GY IP 250 OP 636 PS 637: Performed by: STUDENT IN AN ORGANIZED HEALTH CARE EDUCATION/TRAINING PROGRAM

## 2019-03-12 PROCEDURE — 25800030 ZZH RX IP 258 OP 636: Performed by: ANESTHESIOLOGY

## 2019-03-12 PROCEDURE — 85384 FIBRINOGEN ACTIVITY: CPT | Performed by: HOSPITALIST

## 2019-03-12 PROCEDURE — 93005 ELECTROCARDIOGRAM TRACING: CPT

## 2019-03-12 PROCEDURE — 40000275 ZZH STATISTIC RCP TIME EA 10 MIN

## 2019-03-12 PROCEDURE — 82330 ASSAY OF CALCIUM: CPT

## 2019-03-12 PROCEDURE — 85610 PROTHROMBIN TIME: CPT | Performed by: HOSPITALIST

## 2019-03-12 PROCEDURE — 99233 SBSQ HOSP IP/OBS HIGH 50: CPT | Performed by: NURSE PRACTITIONER

## 2019-03-12 PROCEDURE — 25000125 ZZHC RX 250: Performed by: THORACIC SURGERY (CARDIOTHORACIC VASCULAR SURGERY)

## 2019-03-12 PROCEDURE — 25000128 H RX IP 250 OP 636: Performed by: THORACIC SURGERY (CARDIOTHORACIC VASCULAR SURGERY)

## 2019-03-12 PROCEDURE — 37000008 ZZH ANESTHESIA TECHNICAL FEE, 1ST 30 MIN: Performed by: THORACIC SURGERY (CARDIOTHORACIC VASCULAR SURGERY)

## 2019-03-12 PROCEDURE — 02100Z9 BYPASS CORONARY ARTERY, ONE ARTERY FROM LEFT INTERNAL MAMMARY, OPEN APPROACH: ICD-10-PCS | Performed by: THORACIC SURGERY (CARDIOTHORACIC VASCULAR SURGERY)

## 2019-03-12 PROCEDURE — 83605 ASSAY OF LACTIC ACID: CPT

## 2019-03-12 PROCEDURE — 85014 HEMATOCRIT: CPT

## 2019-03-12 PROCEDURE — 25000128 H RX IP 250 OP 636

## 2019-03-12 PROCEDURE — 37000009 ZZH ANESTHESIA TECHNICAL FEE, EACH ADDTL 15 MIN: Performed by: THORACIC SURGERY (CARDIOTHORACIC VASCULAR SURGERY)

## 2019-03-12 PROCEDURE — 84132 ASSAY OF SERUM POTASSIUM: CPT

## 2019-03-12 PROCEDURE — 25000128 H RX IP 250 OP 636: Performed by: NURSE ANESTHETIST, CERTIFIED REGISTERED

## 2019-03-12 PROCEDURE — 84132 ASSAY OF SERUM POTASSIUM: CPT | Performed by: INTERNAL MEDICINE

## 2019-03-12 PROCEDURE — 25000125 ZZHC RX 250

## 2019-03-12 PROCEDURE — 83605 ASSAY OF LACTIC ACID: CPT | Performed by: STUDENT IN AN ORGANIZED HEALTH CARE EDUCATION/TRAINING PROGRAM

## 2019-03-12 PROCEDURE — 25000132 ZZH RX MED GY IP 250 OP 250 PS 637: Performed by: THORACIC SURGERY (CARDIOTHORACIC VASCULAR SURGERY)

## 2019-03-12 PROCEDURE — 94002 VENT MGMT INPAT INIT DAY: CPT

## 2019-03-12 PROCEDURE — 85610 PROTHROMBIN TIME: CPT | Performed by: STUDENT IN AN ORGANIZED HEALTH CARE EDUCATION/TRAINING PROGRAM

## 2019-03-12 PROCEDURE — 25000125 ZZHC RX 250: Performed by: NURSE ANESTHETIST, CERTIFIED REGISTERED

## 2019-03-12 PROCEDURE — 80053 COMPREHEN METABOLIC PANEL: CPT | Performed by: STUDENT IN AN ORGANIZED HEALTH CARE EDUCATION/TRAINING PROGRAM

## 2019-03-12 PROCEDURE — 25000128 H RX IP 250 OP 636: Performed by: HOSPITALIST

## 2019-03-12 PROCEDURE — 71045 X-RAY EXAM CHEST 1 VIEW: CPT

## 2019-03-12 PROCEDURE — 85027 COMPLETE CBC AUTOMATED: CPT | Performed by: STUDENT IN AN ORGANIZED HEALTH CARE EDUCATION/TRAINING PROGRAM

## 2019-03-12 PROCEDURE — 25000125 ZZHC RX 250: Performed by: ANESTHESIOLOGY

## 2019-03-12 PROCEDURE — 25800030 ZZH RX IP 258 OP 636: Performed by: STUDENT IN AN ORGANIZED HEALTH CARE EDUCATION/TRAINING PROGRAM

## 2019-03-12 PROCEDURE — 25000125 ZZHC RX 250: Performed by: STUDENT IN AN ORGANIZED HEALTH CARE EDUCATION/TRAINING PROGRAM

## 2019-03-12 PROCEDURE — 25000565 ZZH ISOFLURANE, EA 15 MIN: Performed by: THORACIC SURGERY (CARDIOTHORACIC VASCULAR SURGERY)

## 2019-03-12 PROCEDURE — 36415 COLL VENOUS BLD VENIPUNCTURE: CPT | Performed by: INTERNAL MEDICINE

## 2019-03-12 PROCEDURE — 25800030 ZZH RX IP 258 OP 636: Performed by: THORACIC SURGERY (CARDIOTHORACIC VASCULAR SURGERY)

## 2019-03-12 PROCEDURE — 84100 ASSAY OF PHOSPHORUS: CPT | Performed by: STUDENT IN AN ORGANIZED HEALTH CARE EDUCATION/TRAINING PROGRAM

## 2019-03-12 PROCEDURE — 25000131 ZZH RX MED GY IP 250 OP 636 PS 637: Performed by: THORACIC SURGERY (CARDIOTHORACIC VASCULAR SURGERY)

## 2019-03-12 PROCEDURE — 36000073 ZZH SURGERY LEVEL 6 1ST 30 MIN: Performed by: THORACIC SURGERY (CARDIOTHORACIC VASCULAR SURGERY)

## 2019-03-12 PROCEDURE — P9016 RBC LEUKOCYTES REDUCED: HCPCS | Performed by: INTERNAL MEDICINE

## 2019-03-12 PROCEDURE — 36000075 ZZH SURGERY LEVEL 6 EA 15 ADDTL MIN: Performed by: THORACIC SURGERY (CARDIOTHORACIC VASCULAR SURGERY)

## 2019-03-12 PROCEDURE — 25800030 ZZH RX IP 258 OP 636

## 2019-03-12 PROCEDURE — 41000022 ZZH PER-PERFUSION, SH ONLY,  1ST 30 MIN: Performed by: THORACIC SURGERY (CARDIOTHORACIC VASCULAR SURGERY)

## 2019-03-12 PROCEDURE — 25000301 ZZH OR RX SURGIFLO W/THROMBIN KIT 2ML 1991 OPNP: Performed by: THORACIC SURGERY (CARDIOTHORACIC VASCULAR SURGERY)

## 2019-03-12 PROCEDURE — 40000171 ZZH STATISTIC PRE-PROCEDURE ASSESSMENT III: Performed by: THORACIC SURGERY (CARDIOTHORACIC VASCULAR SURGERY)

## 2019-03-12 PROCEDURE — 25800030 ZZH RX IP 258 OP 636: Performed by: NURSE ANESTHETIST, CERTIFIED REGISTERED

## 2019-03-12 PROCEDURE — 021209W BYPASS CORONARY ARTERY, THREE ARTERIES FROM AORTA WITH AUTOLOGOUS VENOUS TISSUE, OPEN APPROACH: ICD-10-PCS | Performed by: THORACIC SURGERY (CARDIOTHORACIC VASCULAR SURGERY)

## 2019-03-12 PROCEDURE — 94003 VENT MGMT INPAT SUBQ DAY: CPT

## 2019-03-12 PROCEDURE — 25000128 H RX IP 250 OP 636: Performed by: STUDENT IN AN ORGANIZED HEALTH CARE EDUCATION/TRAINING PROGRAM

## 2019-03-12 PROCEDURE — 82803 BLOOD GASES ANY COMBINATION: CPT

## 2019-03-12 PROCEDURE — 27210794 ZZH OR GENERAL SUPPLY STERILE: Performed by: THORACIC SURGERY (CARDIOTHORACIC VASCULAR SURGERY)

## 2019-03-12 PROCEDURE — 06BQ4ZZ EXCISION OF LEFT SAPHENOUS VEIN, PERCUTANEOUS ENDOSCOPIC APPROACH: ICD-10-PCS | Performed by: THORACIC SURGERY (CARDIOTHORACIC VASCULAR SURGERY)

## 2019-03-12 PROCEDURE — 83735 ASSAY OF MAGNESIUM: CPT | Performed by: STUDENT IN AN ORGANIZED HEALTH CARE EDUCATION/TRAINING PROGRAM

## 2019-03-12 PROCEDURE — 5A1221Z PERFORMANCE OF CARDIAC OUTPUT, CONTINUOUS: ICD-10-PCS | Performed by: THORACIC SURGERY (CARDIOTHORACIC VASCULAR SURGERY)

## 2019-03-12 PROCEDURE — C9113 INJ PANTOPRAZOLE SODIUM, VIA: HCPCS | Performed by: STUDENT IN AN ORGANIZED HEALTH CARE EDUCATION/TRAINING PROGRAM

## 2019-03-12 PROCEDURE — 93010 ELECTROCARDIOGRAM REPORT: CPT | Performed by: INTERNAL MEDICINE

## 2019-03-12 PROCEDURE — 82330 ASSAY OF CALCIUM: CPT | Performed by: STUDENT IN AN ORGANIZED HEALTH CARE EDUCATION/TRAINING PROGRAM

## 2019-03-12 PROCEDURE — 40000008 ZZH STATISTIC AIRWAY CARE

## 2019-03-12 PROCEDURE — 25000125 ZZHC RX 250: Performed by: HOSPITALIST

## 2019-03-12 PROCEDURE — 85730 THROMBOPLASTIN TIME PARTIAL: CPT | Performed by: STUDENT IN AN ORGANIZED HEALTH CARE EDUCATION/TRAINING PROGRAM

## 2019-03-12 PROCEDURE — 20000003 ZZH R&B ICU

## 2019-03-12 PROCEDURE — 25000128 H RX IP 250 OP 636: Performed by: ANESTHESIOLOGY

## 2019-03-12 PROCEDURE — 25000132 ZZH RX MED GY IP 250 OP 250 PS 637: Performed by: INTERNAL MEDICINE

## 2019-03-12 PROCEDURE — 41000023 ZZH PERA-PERFUSION, SH ONLY,  EACH ADDTL 15 MIN: Performed by: THORACIC SURGERY (CARDIOTHORACIC VASCULAR SURGERY)

## 2019-03-12 PROCEDURE — 84295 ASSAY OF SERUM SODIUM: CPT

## 2019-03-12 PROCEDURE — 82805 BLOOD GASES W/O2 SATURATION: CPT | Performed by: STUDENT IN AN ORGANIZED HEALTH CARE EDUCATION/TRAINING PROGRAM

## 2019-03-12 PROCEDURE — P9041 ALBUMIN (HUMAN),5%, 50ML: HCPCS

## 2019-03-12 PROCEDURE — 25800030 ZZH RX IP 258 OP 636: Performed by: HOSPITALIST

## 2019-03-12 PROCEDURE — B24BZZ4 ULTRASONOGRAPHY OF HEART WITH AORTA, TRANSESOPHAGEAL: ICD-10-PCS | Performed by: THORACIC SURGERY (CARDIOTHORACIC VASCULAR SURGERY)

## 2019-03-12 PROCEDURE — 85730 THROMBOPLASTIN TIME PARTIAL: CPT | Performed by: HOSPITALIST

## 2019-03-12 DEVICE — LEAD PACER MYOCARDIAL BIPOLAR TEMPORARY 53CM 6495F: Type: IMPLANTABLE DEVICE | Status: FUNCTIONAL

## 2019-03-12 RX ORDER — POTASSIUM CHLORIDE 29.8 MG/ML
20 INJECTION INTRAVENOUS
Status: DISCONTINUED | OUTPATIENT
Start: 2019-03-12 | End: 2019-03-18 | Stop reason: HOSPADM

## 2019-03-12 RX ORDER — CEFAZOLIN SODIUM IN 0.9 % NACL 3 G/100 ML
3 INTRAVENOUS SOLUTION, PIGGYBACK (ML) INTRAVENOUS
Status: COMPLETED | OUTPATIENT
Start: 2019-03-12 | End: 2019-03-12

## 2019-03-12 RX ORDER — MAGNESIUM SULFATE HEPTAHYDRATE 40 MG/ML
2 INJECTION, SOLUTION INTRAVENOUS DAILY PRN
Status: DISCONTINUED | OUTPATIENT
Start: 2019-03-12 | End: 2019-03-18 | Stop reason: HOSPADM

## 2019-03-12 RX ORDER — POTASSIUM CHLORIDE 7.45 MG/ML
10 INJECTION INTRAVENOUS
Status: DISCONTINUED | OUTPATIENT
Start: 2019-03-12 | End: 2019-03-18 | Stop reason: HOSPADM

## 2019-03-12 RX ORDER — NICOTINE POLACRILEX 4 MG
15-30 LOZENGE BUCCAL
Status: DISCONTINUED | OUTPATIENT
Start: 2019-03-12 | End: 2019-03-18 | Stop reason: HOSPADM

## 2019-03-12 RX ORDER — PROTAMINE SULFATE 10 MG/ML
INJECTION, SOLUTION INTRAVENOUS PRN
Status: DISCONTINUED | OUTPATIENT
Start: 2019-03-12 | End: 2019-03-12

## 2019-03-12 RX ORDER — OXYCODONE HYDROCHLORIDE 5 MG/1
5-10 TABLET ORAL
Status: DISCONTINUED | OUTPATIENT
Start: 2019-03-12 | End: 2019-03-14

## 2019-03-12 RX ORDER — MEPERIDINE HYDROCHLORIDE 25 MG/ML
12.5-25 INJECTION INTRAMUSCULAR; INTRAVENOUS; SUBCUTANEOUS
Status: DISCONTINUED | OUTPATIENT
Start: 2019-03-12 | End: 2019-03-14

## 2019-03-12 RX ORDER — FENTANYL CITRATE 50 UG/ML
50-100 INJECTION, SOLUTION INTRAMUSCULAR; INTRAVENOUS
Status: DISCONTINUED | OUTPATIENT
Start: 2019-03-12 | End: 2019-03-15

## 2019-03-12 RX ORDER — SODIUM CHLORIDE 9 MG/ML
INJECTION, SOLUTION INTRAVENOUS CONTINUOUS PRN
Status: DISCONTINUED | OUTPATIENT
Start: 2019-03-12 | End: 2019-03-12

## 2019-03-12 RX ORDER — MUPIROCIN 20 MG/G
0.5 OINTMENT TOPICAL 2 TIMES DAILY
Status: DISPENSED | OUTPATIENT
Start: 2019-03-12 | End: 2019-03-17

## 2019-03-12 RX ORDER — CEFAZOLIN SODIUM 1 G/3ML
1 INJECTION, POWDER, FOR SOLUTION INTRAMUSCULAR; INTRAVENOUS SEE ADMIN INSTRUCTIONS
Status: DISCONTINUED | OUTPATIENT
Start: 2019-03-12 | End: 2019-03-12 | Stop reason: HOSPADM

## 2019-03-12 RX ORDER — ATORVASTATIN CALCIUM 40 MG/1
40 TABLET, FILM COATED ORAL AT BEDTIME
Status: DISCONTINUED | OUTPATIENT
Start: 2019-03-12 | End: 2019-03-16

## 2019-03-12 RX ORDER — CEFAZOLIN SODIUM 1 G/3ML
1 INJECTION, POWDER, FOR SOLUTION INTRAMUSCULAR; INTRAVENOUS EVERY 8 HOURS
Status: DISCONTINUED | OUTPATIENT
Start: 2019-03-12 | End: 2019-03-12

## 2019-03-12 RX ORDER — LIDOCAINE 40 MG/G
CREAM TOPICAL
Status: DISCONTINUED | OUTPATIENT
Start: 2019-03-12 | End: 2019-03-18 | Stop reason: HOSPADM

## 2019-03-12 RX ORDER — LIDOCAINE 4 G/G
1 PATCH TOPICAL
Status: DISCONTINUED | OUTPATIENT
Start: 2019-03-13 | End: 2019-03-18 | Stop reason: HOSPADM

## 2019-03-12 RX ORDER — POTASSIUM CHLORIDE 1.5 G/1.58G
20-40 POWDER, FOR SOLUTION ORAL
Status: DISCONTINUED | OUTPATIENT
Start: 2019-03-12 | End: 2019-03-18 | Stop reason: HOSPADM

## 2019-03-12 RX ORDER — FENTANYL CITRATE 50 UG/ML
INJECTION, SOLUTION INTRAMUSCULAR; INTRAVENOUS PRN
Status: DISCONTINUED | OUTPATIENT
Start: 2019-03-12 | End: 2019-03-12

## 2019-03-12 RX ORDER — DEXTROSE MONOHYDRATE, SODIUM CHLORIDE, AND POTASSIUM CHLORIDE 50; 1.49; 4.5 G/1000ML; G/1000ML; G/1000ML
INJECTION, SOLUTION INTRAVENOUS CONTINUOUS
Status: DISCONTINUED | OUTPATIENT
Start: 2019-03-12 | End: 2019-03-15

## 2019-03-12 RX ORDER — SODIUM CHLORIDE, SODIUM LACTATE, POTASSIUM CHLORIDE, CALCIUM CHLORIDE 600; 310; 30; 20 MG/100ML; MG/100ML; MG/100ML; MG/100ML
INJECTION, SOLUTION INTRAVENOUS CONTINUOUS
Status: DISCONTINUED | OUTPATIENT
Start: 2019-03-12 | End: 2019-03-12 | Stop reason: HOSPADM

## 2019-03-12 RX ORDER — SODIUM CHLORIDE, SODIUM LACTATE, POTASSIUM CHLORIDE, CALCIUM CHLORIDE 600; 310; 30; 20 MG/100ML; MG/100ML; MG/100ML; MG/100ML
INJECTION, SOLUTION INTRAVENOUS CONTINUOUS PRN
Status: DISCONTINUED | OUTPATIENT
Start: 2019-03-12 | End: 2019-03-12

## 2019-03-12 RX ORDER — HEPARIN SODIUM 1000 [USP'U]/ML
INJECTION, SOLUTION INTRAVENOUS; SUBCUTANEOUS PRN
Status: DISCONTINUED | OUTPATIENT
Start: 2019-03-12 | End: 2019-03-12

## 2019-03-12 RX ORDER — AMOXICILLIN 250 MG
2 CAPSULE ORAL 2 TIMES DAILY
Status: DISCONTINUED | OUTPATIENT
Start: 2019-03-12 | End: 2019-03-18 | Stop reason: HOSPADM

## 2019-03-12 RX ORDER — POTASSIUM CL/LIDO/0.9 % NACL 10MEQ/0.1L
10 INTRAVENOUS SOLUTION, PIGGYBACK (ML) INTRAVENOUS
Status: DISCONTINUED | OUTPATIENT
Start: 2019-03-12 | End: 2019-03-18 | Stop reason: HOSPADM

## 2019-03-12 RX ORDER — HEPARIN SODIUM 5000 [USP'U]/.5ML
5000 INJECTION, SOLUTION INTRAVENOUS; SUBCUTANEOUS EVERY 8 HOURS
Status: DISCONTINUED | OUTPATIENT
Start: 2019-03-13 | End: 2019-03-18 | Stop reason: HOSPADM

## 2019-03-12 RX ORDER — EPHEDRINE SULFATE 50 MG/ML
INJECTION, SOLUTION INTRAMUSCULAR; INTRAVENOUS; SUBCUTANEOUS PRN
Status: DISCONTINUED | OUTPATIENT
Start: 2019-03-12 | End: 2019-03-12

## 2019-03-12 RX ORDER — NITROGLYCERIN 20 MG/100ML
.07-1.37 INJECTION INTRAVENOUS CONTINUOUS
Status: DISCONTINUED | OUTPATIENT
Start: 2019-03-12 | End: 2019-03-13

## 2019-03-12 RX ORDER — NITROGLYCERIN 10 MG/100ML
INJECTION INTRAVENOUS PRN
Status: DISCONTINUED | OUTPATIENT
Start: 2019-03-12 | End: 2019-03-12

## 2019-03-12 RX ORDER — ACETAMINOPHEN 325 MG/1
975 TABLET ORAL EVERY 8 HOURS
Status: COMPLETED | OUTPATIENT
Start: 2019-03-12 | End: 2019-03-15

## 2019-03-12 RX ORDER — FENTANYL CITRATE 50 UG/ML
200 INJECTION, SOLUTION INTRAMUSCULAR; INTRAVENOUS ONCE
Status: COMPLETED | OUTPATIENT
Start: 2019-03-12 | End: 2019-03-12

## 2019-03-12 RX ORDER — PROPOFOL 10 MG/ML
5-75 INJECTION, EMULSION INTRAVENOUS CONTINUOUS
Status: DISCONTINUED | OUTPATIENT
Start: 2019-03-12 | End: 2019-03-13

## 2019-03-12 RX ORDER — VECURONIUM BROMIDE 1 MG/ML
INJECTION, POWDER, LYOPHILIZED, FOR SOLUTION INTRAVENOUS PRN
Status: DISCONTINUED | OUTPATIENT
Start: 2019-03-12 | End: 2019-03-12

## 2019-03-12 RX ORDER — GABAPENTIN 300 MG/1
300 CAPSULE ORAL 2 TIMES DAILY
Status: COMPLETED | OUTPATIENT
Start: 2019-03-12 | End: 2019-03-15

## 2019-03-12 RX ORDER — MAGNESIUM SULFATE HEPTAHYDRATE 40 MG/ML
4 INJECTION, SOLUTION INTRAVENOUS EVERY 4 HOURS PRN
Status: DISCONTINUED | OUTPATIENT
Start: 2019-03-12 | End: 2019-03-18 | Stop reason: HOSPADM

## 2019-03-12 RX ORDER — PROPOFOL 10 MG/ML
INJECTION, EMULSION INTRAVENOUS PRN
Status: DISCONTINUED | OUTPATIENT
Start: 2019-03-12 | End: 2019-03-12

## 2019-03-12 RX ORDER — ONDANSETRON 4 MG/1
4 TABLET, ORALLY DISINTEGRATING ORAL EVERY 6 HOURS PRN
Status: DISCONTINUED | OUTPATIENT
Start: 2019-03-12 | End: 2019-03-18 | Stop reason: HOSPADM

## 2019-03-12 RX ORDER — METHOCARBAMOL 750 MG/1
750 TABLET, FILM COATED ORAL 4 TIMES DAILY PRN
Status: DISCONTINUED | OUTPATIENT
Start: 2019-03-12 | End: 2019-03-18 | Stop reason: HOSPADM

## 2019-03-12 RX ORDER — NALOXONE HYDROCHLORIDE 0.4 MG/ML
.1-.4 INJECTION, SOLUTION INTRAMUSCULAR; INTRAVENOUS; SUBCUTANEOUS
Status: DISCONTINUED | OUTPATIENT
Start: 2019-03-12 | End: 2019-03-18 | Stop reason: HOSPADM

## 2019-03-12 RX ORDER — HYDRALAZINE HYDROCHLORIDE 20 MG/ML
10 INJECTION INTRAMUSCULAR; INTRAVENOUS EVERY 30 MIN PRN
Status: DISCONTINUED | OUTPATIENT
Start: 2019-03-12 | End: 2019-03-18 | Stop reason: HOSPADM

## 2019-03-12 RX ORDER — POTASSIUM CHLORIDE 1500 MG/1
20-40 TABLET, EXTENDED RELEASE ORAL
Status: DISCONTINUED | OUTPATIENT
Start: 2019-03-12 | End: 2019-03-18 | Stop reason: HOSPADM

## 2019-03-12 RX ORDER — CEFAZOLIN SODIUM 2 G/100ML
2 INJECTION, SOLUTION INTRAVENOUS EVERY 8 HOURS
Status: COMPLETED | OUTPATIENT
Start: 2019-03-12 | End: 2019-03-13

## 2019-03-12 RX ORDER — ALBUMIN, HUMAN INJ 5% 5 %
500-1000 SOLUTION INTRAVENOUS
Status: DISCONTINUED | OUTPATIENT
Start: 2019-03-12 | End: 2019-03-15

## 2019-03-12 RX ORDER — METOPROLOL TARTRATE 25 MG/1
25 TABLET, FILM COATED ORAL EVERY 12 HOURS
Status: DISCONTINUED | OUTPATIENT
Start: 2019-03-13 | End: 2019-03-14

## 2019-03-12 RX ORDER — ACETAMINOPHEN 325 MG/1
650 TABLET ORAL EVERY 4 HOURS PRN
Status: DISCONTINUED | OUTPATIENT
Start: 2019-03-15 | End: 2019-03-15

## 2019-03-12 RX ORDER — SODIUM CHLORIDE 9 MG/ML
INJECTION, SOLUTION INTRAVENOUS CONTINUOUS
Status: DISCONTINUED | OUTPATIENT
Start: 2019-03-12 | End: 2019-03-15

## 2019-03-12 RX ORDER — AMOXICILLIN 250 MG
1 CAPSULE ORAL 2 TIMES DAILY
Status: DISCONTINUED | OUTPATIENT
Start: 2019-03-12 | End: 2019-03-18 | Stop reason: HOSPADM

## 2019-03-12 RX ORDER — ONDANSETRON 2 MG/ML
4 INJECTION INTRAMUSCULAR; INTRAVENOUS EVERY 6 HOURS PRN
Status: DISCONTINUED | OUTPATIENT
Start: 2019-03-12 | End: 2019-03-18 | Stop reason: HOSPADM

## 2019-03-12 RX ORDER — ASPIRIN 81 MG/1
81 TABLET ORAL DAILY
Status: DISCONTINUED | OUTPATIENT
Start: 2019-03-13 | End: 2019-03-13

## 2019-03-12 RX ORDER — PROPOFOL 10 MG/ML
INJECTION, EMULSION INTRAVENOUS CONTINUOUS PRN
Status: DISCONTINUED | OUTPATIENT
Start: 2019-03-12 | End: 2019-03-12

## 2019-03-12 RX ORDER — DEXTROSE MONOHYDRATE 25 G/50ML
25-50 INJECTION, SOLUTION INTRAVENOUS
Status: DISCONTINUED | OUTPATIENT
Start: 2019-03-12 | End: 2019-03-18 | Stop reason: HOSPADM

## 2019-03-12 RX ORDER — CEFAZOLIN SODIUM IN 0.9 % NACL 3 G/100 ML
3 INTRAVENOUS SOLUTION, PIGGYBACK (ML) INTRAVENOUS
Status: DISCONTINUED | OUTPATIENT
Start: 2019-03-12 | End: 2019-03-12 | Stop reason: HOSPADM

## 2019-03-12 RX ADMIN — VECURONIUM BROMIDE 5 MG: 1 INJECTION, POWDER, LYOPHILIZED, FOR SOLUTION INTRAVENOUS at 14:06

## 2019-03-12 RX ADMIN — Medication 1 G: at 15:01

## 2019-03-12 RX ADMIN — SENNOSIDES AND DOCUSATE SODIUM 1 TABLET: 8.6; 5 TABLET ORAL at 21:36

## 2019-03-12 RX ADMIN — LIDOCAINE HYDROCHLORIDE 0.3 ML: 10 INJECTION, SOLUTION EPIDURAL; INFILTRATION; INTRACAUDAL; PERINEURAL at 06:55

## 2019-03-12 RX ADMIN — PHENYLEPHRINE HYDROCHLORIDE 50 MCG: 10 INJECTION, SOLUTION INTRAMUSCULAR; INTRAVENOUS; SUBCUTANEOUS at 08:47

## 2019-03-12 RX ADMIN — FENTANYL CITRATE 50 MCG: 50 INJECTION, SOLUTION INTRAMUSCULAR; INTRAVENOUS at 07:10

## 2019-03-12 RX ADMIN — SODIUM CHLORIDE: 9 INJECTION, SOLUTION INTRAVENOUS at 14:45

## 2019-03-12 RX ADMIN — VECURONIUM BROMIDE 3 MG: 1 INJECTION, POWDER, LYOPHILIZED, FOR SOLUTION INTRAVENOUS at 10:39

## 2019-03-12 RX ADMIN — HEPARIN SODIUM 40000 UNITS: 1000 INJECTION, SOLUTION INTRAVENOUS; SUBCUTANEOUS at 11:04

## 2019-03-12 RX ADMIN — ACETAMINOPHEN 975 MG: 325 TABLET, FILM COATED ORAL at 21:35

## 2019-03-12 RX ADMIN — FENTANYL CITRATE 100 MCG: 50 INJECTION, SOLUTION INTRAMUSCULAR; INTRAVENOUS at 20:17

## 2019-03-12 RX ADMIN — Medication 2.5 MG: at 09:20

## 2019-03-12 RX ADMIN — MIDAZOLAM 2 MG: 1 INJECTION INTRAMUSCULAR; INTRAVENOUS at 07:05

## 2019-03-12 RX ADMIN — VECURONIUM BROMIDE 2 MG: 1 INJECTION, POWDER, LYOPHILIZED, FOR SOLUTION INTRAVENOUS at 10:12

## 2019-03-12 RX ADMIN — FENTANYL CITRATE 100 MCG: 50 INJECTION, SOLUTION INTRAMUSCULAR; INTRAVENOUS at 17:28

## 2019-03-12 RX ADMIN — MIDAZOLAM HYDROCHLORIDE 2 MG: 1 INJECTION, SOLUTION INTRAMUSCULAR; INTRAVENOUS at 11:19

## 2019-03-12 RX ADMIN — FENTANYL CITRATE 100 MCG: 50 INJECTION, SOLUTION INTRAMUSCULAR; INTRAVENOUS at 10:40

## 2019-03-12 RX ADMIN — NITROGLYCERIN 20 MCG: 10 INJECTION INTRAVENOUS at 11:21

## 2019-03-12 RX ADMIN — PROPOFOL 50 MCG/KG/MIN: 10 INJECTION, EMULSION INTRAVENOUS at 14:53

## 2019-03-12 RX ADMIN — PHENYLEPHRINE HYDROCHLORIDE 100 MCG: 10 INJECTION, SOLUTION INTRAMUSCULAR; INTRAVENOUS; SUBCUTANEOUS at 15:41

## 2019-03-12 RX ADMIN — RANITIDINE 150 MG: 150 TABLET ORAL at 04:41

## 2019-03-12 RX ADMIN — METOPROLOL SUCCINATE 25 MG: 25 TABLET, EXTENDED RELEASE ORAL at 04:41

## 2019-03-12 RX ADMIN — SODIUM CHLORIDE, POTASSIUM CHLORIDE, SODIUM LACTATE AND CALCIUM CHLORIDE: 600; 310; 30; 20 INJECTION, SOLUTION INTRAVENOUS at 07:00

## 2019-03-12 RX ADMIN — EPINEPHRINE 0.03 MCG/KG/MIN: 1 INJECTION INTRAMUSCULAR; INTRAVENOUS; SUBCUTANEOUS at 14:02

## 2019-03-12 RX ADMIN — OXYCODONE HYDROCHLORIDE 5 MG: 5 TABLET ORAL at 23:08

## 2019-03-12 RX ADMIN — VECURONIUM BROMIDE 5 MG: 1 INJECTION, POWDER, LYOPHILIZED, FOR SOLUTION INTRAVENOUS at 12:58

## 2019-03-12 RX ADMIN — FENTANYL CITRATE 100 MCG: 50 INJECTION, SOLUTION INTRAMUSCULAR; INTRAVENOUS at 11:19

## 2019-03-12 RX ADMIN — PROPOFOL 140 MG: 10 INJECTION, EMULSION INTRAVENOUS at 08:35

## 2019-03-12 RX ADMIN — MIDAZOLAM HYDROCHLORIDE 2 MG: 1 INJECTION, SOLUTION INTRAMUSCULAR; INTRAVENOUS at 14:06

## 2019-03-12 RX ADMIN — PHENYLEPHRINE HYDROCHLORIDE 0.25 MCG/KG/MIN: 10 INJECTION, SOLUTION INTRAMUSCULAR; INTRAVENOUS; SUBCUTANEOUS at 08:46

## 2019-03-12 RX ADMIN — GABAPENTIN 300 MG: 300 CAPSULE ORAL at 21:36

## 2019-03-12 RX ADMIN — MIDAZOLAM HYDROCHLORIDE 2 MG: 1 INJECTION, SOLUTION INTRAMUSCULAR; INTRAVENOUS at 15:45

## 2019-03-12 RX ADMIN — FENTANYL CITRATE 100 MCG: 50 INJECTION, SOLUTION INTRAMUSCULAR; INTRAVENOUS at 09:16

## 2019-03-12 RX ADMIN — VECURONIUM BROMIDE 5 MG: 1 INJECTION, POWDER, LYOPHILIZED, FOR SOLUTION INTRAVENOUS at 10:57

## 2019-03-12 RX ADMIN — OXYCODONE HYDROCHLORIDE 5 MG: 5 TABLET ORAL at 22:18

## 2019-03-12 RX ADMIN — FENTANYL CITRATE 100 MCG: 50 INJECTION, SOLUTION INTRAMUSCULAR; INTRAVENOUS at 14:06

## 2019-03-12 RX ADMIN — ROCURONIUM BROMIDE 20 MG: 10 INJECTION INTRAVENOUS at 09:16

## 2019-03-12 RX ADMIN — PROPOFOL 60 MG: 10 INJECTION, EMULSION INTRAVENOUS at 08:45

## 2019-03-12 RX ADMIN — FENTANYL CITRATE 50 MCG: 50 INJECTION, SOLUTION INTRAMUSCULAR; INTRAVENOUS at 08:25

## 2019-03-12 RX ADMIN — FENTANYL CITRATE 100 MCG: 50 INJECTION, SOLUTION INTRAMUSCULAR; INTRAVENOUS at 08:57

## 2019-03-12 RX ADMIN — MIDAZOLAM 1 MG: 1 INJECTION INTRAMUSCULAR; INTRAVENOUS at 07:14

## 2019-03-12 RX ADMIN — FENTANYL CITRATE 100 MCG: 50 INJECTION, SOLUTION INTRAMUSCULAR; INTRAVENOUS at 09:36

## 2019-03-12 RX ADMIN — AMINOCAPROIC ACID 1 G/HR: 250 INJECTION, SOLUTION INTRAVENOUS at 09:55

## 2019-03-12 RX ADMIN — FENTANYL CITRATE 100 MCG: 50 INJECTION, SOLUTION INTRAMUSCULAR; INTRAVENOUS at 19:22

## 2019-03-12 RX ADMIN — SUCCINYLCHOLINE CHLORIDE 100 MG: 20 INJECTION, SOLUTION INTRAMUSCULAR; INTRAVENOUS; PARENTERAL at 08:35

## 2019-03-12 RX ADMIN — CEFAZOLIN SODIUM 2 G: 2 INJECTION, SOLUTION INTRAVENOUS at 23:10

## 2019-03-12 RX ADMIN — Medication 1 G: at 13:00

## 2019-03-12 RX ADMIN — MUPIROCIN 1 EACH: 20 OINTMENT TOPICAL at 04:41

## 2019-03-12 RX ADMIN — PHENYLEPHRINE HYDROCHLORIDE 100 MCG: 10 INJECTION, SOLUTION INTRAMUSCULAR; INTRAVENOUS; SUBCUTANEOUS at 11:29

## 2019-03-12 RX ADMIN — FENTANYL CITRATE 150 MCG: 50 INJECTION, SOLUTION INTRAMUSCULAR; INTRAVENOUS at 09:28

## 2019-03-12 RX ADMIN — PANTOPRAZOLE SODIUM 40 MG: 40 INJECTION, POWDER, FOR SOLUTION INTRAVENOUS at 18:27

## 2019-03-12 RX ADMIN — VECURONIUM BROMIDE 5 MG: 1 INJECTION, POWDER, LYOPHILIZED, FOR SOLUTION INTRAVENOUS at 11:53

## 2019-03-12 RX ADMIN — Medication 3 G: at 09:00

## 2019-03-12 RX ADMIN — Medication 2.5 MG: at 10:18

## 2019-03-12 RX ADMIN — MIDAZOLAM HYDROCHLORIDE 1 MG: 1 INJECTION, SOLUTION INTRAMUSCULAR; INTRAVENOUS at 09:37

## 2019-03-12 RX ADMIN — SODIUM CHLORIDE: 9 INJECTION, SOLUTION INTRAVENOUS at 08:26

## 2019-03-12 RX ADMIN — PHENYLEPHRINE HYDROCHLORIDE 100 MCG: 10 INJECTION, SOLUTION INTRAMUSCULAR; INTRAVENOUS; SUBCUTANEOUS at 15:20

## 2019-03-12 RX ADMIN — Medication 1 G: at 11:00

## 2019-03-12 RX ADMIN — Medication 2.5 MG: at 09:19

## 2019-03-12 RX ADMIN — POTASSIUM CHLORIDE, DEXTROSE MONOHYDRATE AND SODIUM CHLORIDE: 150; 5; 450 INJECTION, SOLUTION INTRAVENOUS at 17:25

## 2019-03-12 RX ADMIN — SODIUM CHLORIDE 1 UNITS/HR: 9 INJECTION, SOLUTION INTRAVENOUS at 20:19

## 2019-03-12 RX ADMIN — FENTANYL CITRATE 100 MCG: 50 INJECTION, SOLUTION INTRAMUSCULAR; INTRAVENOUS at 08:35

## 2019-03-12 RX ADMIN — FENTANYL CITRATE 50 MCG: 50 INJECTION, SOLUTION INTRAMUSCULAR; INTRAVENOUS at 07:15

## 2019-03-12 RX ADMIN — FENTANYL CITRATE 50 MCG: 50 INJECTION, SOLUTION INTRAMUSCULAR; INTRAVENOUS at 07:26

## 2019-03-12 RX ADMIN — AMINOCAPROIC ACID 5 G/HR: 250 INJECTION, SOLUTION INTRAVENOUS at 08:50

## 2019-03-12 RX ADMIN — NITROGLYCERIN 60 MCG: 10 INJECTION INTRAVENOUS at 14:25

## 2019-03-12 RX ADMIN — FENTANYL CITRATE 50 MCG: 50 INJECTION, SOLUTION INTRAMUSCULAR; INTRAVENOUS at 07:05

## 2019-03-12 RX ADMIN — ROCURONIUM BROMIDE 50 MG: 10 INJECTION INTRAVENOUS at 08:41

## 2019-03-12 RX ADMIN — PROTAMINE SULFATE 375 MG: 10 INJECTION, SOLUTION INTRAVENOUS at 14:25

## 2019-03-12 RX ADMIN — NITROGLYCERIN 40 MCG: 10 INJECTION INTRAVENOUS at 14:29

## 2019-03-12 RX ADMIN — NITROGLYCERIN 0.07 MCG/KG/MIN: 20 INJECTION INTRAVENOUS at 19:55

## 2019-03-12 RX ADMIN — ROCURONIUM BROMIDE 30 MG: 10 INJECTION INTRAVENOUS at 09:36

## 2019-03-12 RX ADMIN — SODIUM CHLORIDE: 9 INJECTION, SOLUTION INTRAVENOUS at 15:24

## 2019-03-12 RX ADMIN — SODIUM CHLORIDE, POTASSIUM CHLORIDE, SODIUM LACTATE AND CALCIUM CHLORIDE: 600; 310; 30; 20 INJECTION, SOLUTION INTRAVENOUS at 07:37

## 2019-03-12 RX ADMIN — NITROGLYCERIN 20 MCG: 10 INJECTION INTRAVENOUS at 11:20

## 2019-03-12 RX ADMIN — PHENYLEPHRINE HYDROCHLORIDE 100 MCG: 10 INJECTION, SOLUTION INTRAMUSCULAR; INTRAVENOUS; SUBCUTANEOUS at 11:28

## 2019-03-12 RX ADMIN — MIDAZOLAM HYDROCHLORIDE 1 MG: 1 INJECTION, SOLUTION INTRAMUSCULAR; INTRAVENOUS at 08:25

## 2019-03-12 RX ADMIN — MIDAZOLAM 1 MG: 1 INJECTION INTRAMUSCULAR; INTRAVENOUS at 07:26

## 2019-03-12 RX ADMIN — ATORVASTATIN CALCIUM 40 MG: 40 TABLET, FILM COATED ORAL at 21:36

## 2019-03-12 RX ADMIN — SODIUM CHLORIDE, POTASSIUM CHLORIDE, SODIUM LACTATE AND CALCIUM CHLORIDE: 600; 310; 30; 20 INJECTION, SOLUTION INTRAVENOUS at 08:26

## 2019-03-12 RX ADMIN — DEXMEDETOMIDINE HYDROCHLORIDE 0.2 MCG/KG/HR: 100 INJECTION, SOLUTION INTRAVENOUS at 18:26

## 2019-03-12 RX ADMIN — FENTANYL CITRATE 100 MCG: 50 INJECTION, SOLUTION INTRAMUSCULAR; INTRAVENOUS at 14:29

## 2019-03-12 ASSESSMENT — MIFFLIN-ST. JEOR: SCORE: 2419.4

## 2019-03-12 ASSESSMENT — ACTIVITIES OF DAILY LIVING (ADL)
ADLS_ACUITY_SCORE: 14
ADLS_ACUITY_SCORE: 15

## 2019-03-12 ASSESSMENT — LIFESTYLE VARIABLES: TOBACCO_USE: 1

## 2019-03-12 NOTE — ANESTHESIA POSTPROCEDURE EVALUATION
Patient: Edilberto Brooks    Procedure(s):  CORONARY ARTERY BYPASS GRAFTING x 4 (LIMA - LAD; SV - OM; SV - PDA; SV - DIAGONAL) WITH ENDOSCOPIC VEIN HARVEST ON THE LEFT LOWER EXTREMITY    (ON PUMP OXYGENATOR ; NANCY BY BRENNEN)    Diagnosis:CORONARY ARTERY DISEASE  Diagnosis Additional Information: No value filed.    Anesthesia Type:  General, ETT    Note:  Anesthesia Post Evaluation    Patient location during evaluation: ICU  Patient participation: Unable to participate in evaluation secondary to underlying medical condition  Level of consciousness: obtunded/minimal responses  Pain management: unable to assess  Airway patency: patent  Cardiovascular status: stable  Respiratory status: ventilator and intubated  Hydration status: acceptable  PONV: unable to assess             Last vitals:  Vitals:    03/12/19 0300 03/12/19 0441 03/12/19 0718   BP: 128/78 126/82 (!) 144/93   Pulse:      Resp: 16  16   Temp: 36.8  C (98.2  F)  36.3  C (97.3  F)   SpO2:   98%         Electronically Signed By: Nataliya Dowell MD  March 12, 2019  4:46 PM

## 2019-03-12 NOTE — PLAN OF CARE
VSS and WNL for this pt. Pt A&O. LS clear on room air and tele SR. Pt having CABG this AM, answered questions and aware of plan to go to preop at 0600. Has been NPO since NOC. Pt denies CP or SOB. Will go to ICU following 0800 procedure. Will pass on and continue to monitor.

## 2019-03-12 NOTE — ANESTHESIA PREPROCEDURE EVALUATION
Anesthesia Pre-Procedure Evaluation    Patient: Edilberto Brooks   MRN: [id number] : 1970          Preoperative Diagnosis: CORONARY ARTERY DISEASE    Procedure(s):  CORONARY ARTERY BYPASS GRAFTING (ON PUMP, NO NANCY, NO BALLOON)  CARDIOLOGIST:  CEASAR    Past Medical History:   Diagnosis Date     Hypercholesteremia      Hypertension      Obesity      Tobacco use disorder, continuous      Past Surgical History:   Procedure Laterality Date     CV HEART CATHETERIZATION WITH POSSIBLE INTERVENTION N/A 3/7/2019    Procedure: Heart Catheterization with possible Intervention;  Surgeon: Drew Logan MD;  Location:  HEART CARDIAC CATH LAB     FRACTURE SURGERY      left leg        Anesthesia Evaluation     . Pt has had prior anesthetic.     No history of anesthetic complications          ROS/MED HX    ENT/Pulmonary:     (+)tobacco use, Current use , . .   (-) sleep apnea   Neurologic:  - neg neurologic ROS     Cardiovascular:     (+) Dyslipidemia, hypertension--CAD, -past MI,-. : . . . :. . Previous cardiac testing Echodate:3/2019results:Technically difficult study.  The visual ejection fraction is estimated at 55-60%.  Normal left ventricular wall motion  Dilated inferior vena cava  Borderline/mild aortic root dilatation.date: results: date: results:Cath date: results:1) Normal left main  2) LAD is a large vessel that has 90% ostial disease and 80% tubular  disease. Mid and distal LAD are good targets.  LAD gives rise to D1  with 80% disease and good distal target  3) Circumflex is large and dominant. Gives rise to OM1 that has 70%  ostial followed by 60% discrete lesion and no significant disease  distally. OM2 without significant disease. L-PDA with 99% disease and  L-L collaterals. There is a good distal target.   4) RCA is occluded at the ostium with L-R collaterals. It is small in  size.          METS/Exercise Tolerance:     Hematologic:  - neg hematologic  ROS       Musculoskeletal:         GI/Hepatic:  -  "neg GI/hepatic ROS      (-) GERD   Renal/Genitourinary:  - ROS Renal section negative       Endo:     (+) Obesity, .      Psychiatric:         Infectious Disease:         Malignancy:         Other:                          Physical Exam  Normal systems: cardiovascular, pulmonary and dental    Airway   Mallampati: III  TM distance: >3 FB  Neck ROM: full    Dental     Cardiovascular       Pulmonary             Lab Results   Component Value Date    WBC 9.6 03/10/2019    HGB 13.2 (L) 03/10/2019    HCT 39.2 (L) 03/10/2019     03/10/2019    CRP 85.2 (H) 03/11/2019    SED 19 (H) 03/11/2019     03/11/2019    POTASSIUM 4.2 03/12/2019    CHLORIDE 106 03/11/2019    CO2 26 03/11/2019    BUN 15 03/11/2019    CR 0.98 03/11/2019    GLC 94 03/11/2019    KAREN 8.4 (L) 03/11/2019    MAG 2.2 03/06/2019    ALBUMIN 3.0 (L) 03/11/2019    PROTTOTAL 6.5 (L) 03/11/2019    ALT 65 03/11/2019    AST 33 03/11/2019    ALKPHOS 98 03/11/2019    BILITOTAL 0.7 03/11/2019       Preop Vitals  BP Readings from Last 3 Encounters:   03/12/19 126/82    Pulse Readings from Last 3 Encounters:   03/11/19 73      Resp Readings from Last 3 Encounters:   03/12/19 16    SpO2 Readings from Last 3 Encounters:   03/11/19 97%      Temp Readings from Last 1 Encounters:   03/12/19 36.8  C (98.2  F) (Oral)    Ht Readings from Last 1 Encounters:   03/06/19 1.905 m (6' 3\")      Wt Readings from Last 1 Encounters:   03/12/19 145.9 kg (321 lb 9.6 oz)    Estimated body mass index is 40.2 kg/m  as calculated from the following:    Height as of this encounter: 1.905 m (6' 3\").    Weight as of this encounter: 145.9 kg (321 lb 9.6 oz).       Anesthesia Plan      History & Physical Review  History and physical reviewed and following examination; no interval change.    ASA Status:  3 .    NPO Status:  > 8 hours    Plan for General and ETT with Intravenous and Propofol induction. Maintenance will be Balanced.    PONV prophylaxis:  Ondansetron (or other " 5HT-3)  Additional equipment: Videolaryngoscope, 2nd IV, Arterial Line, Central Line, CVP and NANCY      Postoperative Care  Postoperative pain management:  Multi-modal analgesia.      Consents  Anesthetic plan, risks, benefits and alternatives discussed with:  Patient..                 Nataliya Dowell MD

## 2019-03-12 NOTE — ANESTHESIA PROCEDURE NOTES
CENTRAL LINE INSERTION PROCEDURE NOTE:   Pre-Procedure    Location: pre-op      Pre-Anesthestic Checklist: patient identified, IV checked, site marked, risks and benefits discussed, informed consent, monitors and equipment checked, pre-op evaluation and at physician/surgeon's request    Timeout  Correct Patient: Yes   Correct Procedure: Yes   Correct Site: Yes   Correct Laterality: N/A   Correct Position: Yes   Site Marked: Yes, N/A   .   Procedure Documentation   Procedure: central line and elective  Position: Trendelenburg  Patient Prep;all elements of maximal sterile barrier technique followed, mask, hat, sterile gown, sterile gloves, draped, hand hygiene, chlorhexidine gluconate and isopropyl alcohol      Insertion Site:right, internal jugular  Using U/S with sterile probe cover and sterile gel, vein evaluated for patency/adequacy of cortis insertion is adequate, and using realtime U/S imaging the kylah was punctured, and needle was observed entering vein on U/S. A permanent image is entered into the patient's record.   Skin infiltrated with mL of 1% lidocaine.  Catheter: 9 [place]i, 10 cm (sheath introducer)  Assessment/Narrative         Secured by suture  Tegaderm and Biopatch dressing used.  blood aspirated from all lumens  All lumens flushed: Yes  Verification method: Placement to be verified post-op

## 2019-03-12 NOTE — OP NOTE
Date of Service: 3/12/2019    Preoperative Diagnosis:   1) Severe multivessel coronary artery disease with recent NSTEMI  2) Hypertension  3) Hyperlipidemia  4) Morbid obesity, with BMI >40    Postoperative Diagnosis:   Same    Operation: Coronary artery bypass grafting x4, LIMA-LAD, SVG-DIAG, SVG-OM, SVG-LPDA; endoscopic harvest left greater saphenous vein    Surgeon: Mike Rincon MD    Assistant: Clarence Carballo MD; AIED Jacobs      Indication: 48 year old male admitted last week with chest pain and found to have elevated troponin. Cor angio revealed severe multivessel coronary disease. ECHO showed essentially preserved function. Following discussion of risks and benefits, he has elected to proceed with surgery.      Findings: Sternum of adequate quality. LIMA 2mm and good flow. Saphenous vein mostly 3-4mm and good quality. LPDA diffusely diseased/calcified. Grafted at best appearing site, still moderate disease, only 1.5mm, not a very good target. Non diseased distal LCx system/distal OM within 1.5cm of this vessel. OM 2mm and excellent target.  Diagonal moderate diffuse disease, grafted somewhat distally, minimal disease at this site, 1.75mm. LAD 2mm and good target.    Procedure: The patient was brought to the operating room and placed supine on the OR table. Appropriate monitoring lines were placed and general endotracheal anesthesia established. The patient was then sterilely prepped and draped in the standard fashion. A time out was performed to confirm patient identity, procedure to be performed, and the administration of pre operative antibiotics.    A midline sternotomy was performed. Peristernal hemostasis was achieved and the pericardium opened. The left pleural space was entered and the left internal thoracic artery was dissected off the chest wall from the level of the subclavian vein to the xiphoid process. Simultaneously, the greater saphenous vein was harvested from the left lower  extremity.    The patient was systemically heparinized and the internal thoracic artery divided. Hemostasis of the chest wall was confirmed. The pericardium was tented on sutures and the vein inspected and noted to be adequate for conduit. After confirmation of adequate ACT, the distal ascending aorta and right atrial appendage were cannulated. Additionally a retrograde cardioplegia catheter was inserted into the coronary sinus and an antegrade catheter/root vent secured in the mid ascending aorta. Cardiopulmonary bypass was established.    The aorta was cross clamped and the heart arrested with antegrade cardioplegia. Additional retrograde was given to ensure good position, and additional doses of cardioplegia were supplied every 15 minutes for the remainder of cross clamp.    The left posterior descending coronary artery was identified and dissected out over approximately 1 cm and opened for approximately 7 mm. The vein was brought to the field and the distal anastomosis created in end to side fashion with running 7-0 Prolene. Cardioplegia was flushed down the graft and the anastomosis noted to be hemostatic.    The obtuse marginal coronary artery was identified and dissected out over approximately 1 cm and opened for approximately 7 mm. The vein was brought to the field and the distal anastomosis created in end to side fashion with running 7-0 Prolene. Cardioplegia was flushed down the graft and the anastomosis noted to be hemostatic.    The diagonal branch of the left anterior descending coronary artery was identified and dissected out over approximately 1 cm and opened for approximately 7 mm. The vein was brought to the field and the distal anastomosis created in end to side fashion with running 7-0 Prolene. Cardioplegia was flushed down the graft and the anastomosis noted to be hemostatic.    The left anterior descending coronary artery was identified and dissected out over approximately 1 cm and opened for  approximately 7 mm. The internal thoracic artery was brought to the field and the distal anastomosis created in end to side fashion with running 7-0 Prolene. Native flow was allowed down the graft and the anastomosis noted to be hemostatic.    Three separate punch aortotomies were created on the ascending aorta, and the proximal anastomoses created in end to side fashion with running 6-0 Prolene. The hot shot was administered in a retrograde fashion and the aortic cross clamp removed. Total period of aortic cross clamp was 131 minutes. The vein grafts were de aired and the retrograde catheter removed. The distal anastomoses were inspected and noted to be hemostatic. Ventricular pacing wires were placed.    Ventilation was resumed and the patient weaned from cardiopulmonary bypass without difficulty. Total period of cardiopulmonary bypass was 154 minutes. Protamine was administered and the patient decannulated after the pump volume was infused.      A 32F angled chest tube was placed in the left pleural space, as well as a 32F mediastinal chest tube, both of which were brought out through separate subcostal stab incisions. The sternum was then reapproximated with interrupted single and double steel wires and the presternal tissues closed in multiple layers of absorbable suture. Sterile dressings were applied. All counts were reported as correct.    The patient was then taken to the intensive care unit in critical but stable condition.        Mike Rincon MD

## 2019-03-12 NOTE — PROGRESS NOTES
CVTS Staff Note    Met with patient yesterday to discuss operative plan, risks and benefits, and anticipated postoperative recovery. He expressed understanding and had all of his questions answered.    Again met with patient this AM along with his family. He had no further questions.    His sister had many concerns about his hospital course to date, his care prior to surgery, and the information provided during that time. I apologized for any inaccurate information that was given regarding the perioperative period by caregivers not on the cardiac surgical team, and encouraged her to provide feedback to the unit on which he was staying.     I again reiterated the typical postoperative course and answered all of her questions.      Mike Rincon MD

## 2019-03-12 NOTE — CONSULTS
CARDIAC SURGERY NUTRITION CONSULT    Received standing order to assess and educate patient.    Will follow and complete assessment once patient is extubated and/or is transferred to medical unit.    Patient will receive nutrition education during the Outpatient Cardiac Rehab Program (nutrition classes/dietitian counseling).    Karla Estevez, MADELINE, LD, [facility]

## 2019-03-12 NOTE — PROGRESS NOTES
Hospitalist brief note:     Patient in OR today. Will f/u tomorrow.     Violeta Dennis MD  Hospitalist

## 2019-03-12 NOTE — ANESTHESIA PROCEDURE NOTES
ARTERIAL LINE PROCEDURE NOTE:   Pre-Procedure  Performed by Nataliya Dowell MD  Location: pre-op      Pre-Anesthestic Checklist: patient identified, IV checked, risks and benefits discussed, informed consent, monitors and equipment checked and pre-op evaluation    Timeout  Correct Patient: Yes   Correct Procedure: Yes   Correct Site: Yes     Correct Position: Yes     .   Procedure Documentation  Procedure: arterial line  ASA 3  Supine  Insertion Site:radial, right.Skin infiltrated with 1 mL of 1% lidocaine. Injection technique: Seldinger Technique, Sukumar's test completed and ultrasound guided  .  .  Patient Prep;all elements of maximal sterile barrier technique followed, mask, hat, sterile gown, sterile gloves, draped, hand hygiene, chlorhexidine gluconate and isopropyl alcohol    Assessment/Narrative    Catheter: 20 gauge, 12 cm     Secured by suture and other  Tegaderm dressing used.    Arterial waveform: Yes IBP within 10% of NIBP: Yes    Comments:  U/S image was saved.  Abilio Dowell MD

## 2019-03-12 NOTE — ANESTHESIA CARE TRANSFER NOTE
Patient: Edilberto Brooks    Procedure(s):  CORONARY ARTERY BYPASS GRAFTING x 4 (LIMA - LAD; SV - OM; SV - PDA; SV - DIAGONAL) WITH ENDOSCOPIC VEIN HARVEST ON THE LEFT LOWER EXTREMITY    (ON PUMP OXYGENATOR ; NANCY BY BRENNEN)    Diagnosis: CORONARY ARTERY DISEASE  Diagnosis Additional Information: No value filed.    Anesthesia Type:   General, ETT     Note:  Level of Conscious:Sedated on propofol gtt  Vital Signs   BP:122/61   HR:78   RR:15   O2 Saturation:100   Oxygen FiO2: 0.6   PEEP: 5  Dentition:Unchanged from preop  Patient Status:Stable  Report to ICU RN.  Vitals: (Last set prior to Anesthesia Care Transfer)    CRNA VITALS  3/12/2019 1515 - 3/12/2019 1612      3/12/2019             Pulse:  74    ART BP:  128/73                Electronically Signed By: Christine Marie Volp Hodgkins, CRNA, APRN CRNA  March 12, 2019  4:12 PM

## 2019-03-12 NOTE — CONSULTS
[facility]  History and Physical/ Consult  Critical Care Service  Date of Admission:  3/6/2019  Date of Service (when I saw the patient): 03/12/19  Assessment & Plan   Edilberto Brooks is a 48 year old male with PMH obesity and HTN who presents to the ICU after a CABG x4 (LIMA-LAD, SVG-DIAG, SVG-OM, SVG-LPDA).    Neuro  Dx: Post-operative pain and sedation needs  - Propofol infusion to achieve RASS goal 0 to -1  - Fentanyl, acetaminophen, and oxycodone available prn  - Avoiding morphine at family request    CV:  Dx: CAD  Dx: HTN  Dx: HLD  - Maintain SBP <130, not currently on any vasoactive medications  - Cardiac meds per CV surgery (currently on amlodipine, losartan, and metoprolol)  - Continue ASA and statin  - Epicardial ventricular pacing wire in, not currently paced    Resp:  Dx: Post-operative ventilator management  - Assist control 15/550/+5/40%  - SBT when awake, hope to extubate this evening    Dx: Concern for bronchospasm, no history of obstructive airway disease  - Continue duonebs prn    GI/Nutrition  Dx: Nutrition  - NPO until extubated    Renal  Dx: No acute issues, baseline creatinine 1.0  - Monitor function and electrolytes as needed with replacement per ICU protocols.   - Avoid nephrotoxic agents such as NSAID, IV contrast unless specifically required  - Adjust medications as needed for renal clearance  - Caraballo for strict I/O in post-operative period    ID  Dx: No acute issues  - Complete dione-operative antibiotics (ancef)  - Monitor fevers and leukocytosis consistent with stress response from procedure, no cultures for fevers in POD 1-2    Endocrine  Dx: Stress induced hyperglycemia  - POC glucose every 2 hours for the first 4 hours and then 4 hours for 48 hours, goal to maintain less than 150  - Insulin drip if needed to maintain blood glucose <150    Heme:  Dx: Acute blood loss anemia  Dx: Leukocytosis, likely reactive in the setting of surgery  - Monitor chest tube output  - Transfuse  for hemoglobin <7  - CBC again tomorrow    MSK:  Dx: Sternotomy  - Sternal precautions per protocol    Dx: Suspected right ankle gout  - Continuing short course of prednisone    General cares:  DVT Prophylaxis: Defer to primary service  GI Prophylaxis: PPI per primary service  Restraints: Restraints for medical healing needed: YES  Family update by me today: No  Current lines are required for patient management  Access:  - Right internal jugular  - Arterial line  - ETT  - OG  - Caraballo  - Chest tube x2    Anat Burks    Time Spent on this Encounter   Billing: I spent 30 minutes bedside and on the inpatient unit today managing the care of Edilberto Brooks in relation to the issues listed in this note.    Code Status   Full Code    Primary Care Physician   [facility]    Chief Complaint   Post-op CABG  History is obtained from the chart. The patient is unable to participate in interview secondary to intubation and sedation.     History of Present Illness   Edilberto Brooks is a 48 year old male with PMH obesity and HTN who presents to the ICU after a CABG x4. He initially presented to the ED on 3/6/19 after experiencing chest tightness that didn't improve with ASA and ibuprofen and was followed by shortness of breath, lightheadedness, and bilateral arm tingling. Workup revealed elevated troponin without EKG changes and hypokalemia.     He was admitted to the hospitalist service, and when his troponin continued to rise he was started on heparin infusion and cardiology was consulted. A coronary angio done on 3/8/19 showed severe 3 vessel disease with mild aortic root dilation. CV surgery was consulted and CABG planned for today.     He was an easy intubation with glidescope. He was difficult to mask and required two people with an oral airway. The procedure was uncomplicated. CPB was 154 minutes, cross clamp time 131 minutes. He received 2.9 L crystalloid, 435 mL cell saver, and no other blood product. EBL was 725 mL  and  mL.     Past Medical History    PMH is obtained from the chart. The patient is unable to participate in interview secondary to intubation and sedation.  HTN  Obesity    Past Surgical History   Surgical history is obtained from the chart. The patient is unable to participate in interview secondary to intubation and sedation.  No surgical history in his chart.    Prior to Admission Medications   Prior to Admission Medications   Prescriptions Last Dose Informant Patient Reported? Taking?   acetaminophen (TYLENOL) 325 MG tablet prn  Yes Yes   Sig: Take 325-650 mg by mouth every 6 hours as needed for mild pain   amLODIPine (NORVASC) 10 MG tablet 3/5/2019 at pm  Yes Yes   Sig: Take 10 mg by mouth daily   aspirin (ASA) 325 MG EC tablet prn  Yes Yes   Sig: Take 325 mg by mouth daily as needed for moderate pain    ibuprofen (ADVIL/MOTRIN) 200 MG tablet prn  Yes Yes   Sig: Take 200-400 mg by mouth every 4 hours as needed for mild pain   levothyroxine (SYNTHROID/LEVOTHROID) 50 MCG tablet Past Month at Unknown time  Yes Yes   Sig: Take 50 mcg by mouth daily   losartan (COZAAR) 25 MG tablet 3/5/2019 at pm  Yes Yes   Sig: Take 25 mg by mouth daily   triamterene-HCTZ (MAXZIDE) 75-50 MG tablet 3/5/2019 at pm  Yes Yes   Sig: Take 1 tablet by mouth every morning       Facility-Administered Medications: None     Allergies   Allergies   Allergen Reactions     Codeine      Hypotension/ Dizziness          Social History   Social history is obtained from the chart. The patient is unable to participate in interview secondary to intubation and sedation.  He says he does not currently smoke, chart indicates he smokes 1/2 ppd with a 8.5 pack year history. He endorses social alcohol use and denies illicit drug use.     Family History   Family history is obtained from the chart. The patient is unable to participate in interview secondary to intubation and sedation.  He has a family history of coronary artery disease.     Review of  Systems   Unable to perform complete review of systems secondary to intubation and sedation.    Physical Exam   Temp: 97.3  F (36.3  C) Temp src: Temporal Temp  Min: 97.3  F (36.3  C)  Max: 98.4  F (36.9  C) BP: (!) 144/93   Heart Rate: 71 Resp: 16 SpO2: 98 % O2 Device: None (Room air)    Vital Signs with Ranges  Temp:  [97.3  F (36.3  C)-98.4  F (36.9  C)] 97.3  F (36.3  C)  Heart Rate:  [71-92] 71  Resp:  [16] 16  BP: (126-144)/(78-93) 144/93  SpO2:  [97 %-98 %] 98 %  321 lbs 9.6 oz    GEN: Sedated  EYES: No icterus  HEENT:  Normocephalic, trachea midline, ETT secure  CV: RRR, no gallops, rubs, or murmurs; right pedal pulses 2+, left pedal pulses 1+; no pedal edema, left foot cool to touch  PULM/CHEST: Clear breath sounds bilaterally without rhonchi, crackles or wheeze, symmetric chest rise  GI: Bowel sounds hypoactive, abdomen soft and non-tender, no masses  : Caraballo catheter in place, urine yellow and clear  NEURO: Unable to assess due to sedation and NMB. Pupils 3 mm.  SKIN: No rashes noted. Sternotomy incision covered with dressing, clean/dry/intact. Chest tube insertion sites without drainage.    Imaging personally reviewed: ECG and CXR    Data   Results for orders placed or performed during the hospital encounter of 03/06/19 (from the past 24 hour(s))   Potassium   Result Value Ref Range    Potassium 4.2 3.4 - 5.3 mmol/L   Glucose by meter   Result Value Ref Range    Glucose 96 70 - 99 mg/dL   Glucose by meter   Result Value Ref Range    Glucose 113 (H) 70 - 99 mg/dL   Glucose by meter   Result Value Ref Range    Glucose 123 (H) 70 - 99 mg/dL   Glucose by meter   Result Value Ref Range    Glucose 141 (H) 70 - 99 mg/dL   CBC with platelets   Result Value Ref Range    WBC 28.8 (H) 4.0 - 11.0 10e9/L    RBC Count 3.66 (L) 4.4 - 5.9 10e12/L    Hemoglobin 11.1 (L) 13.3 - 17.7 g/dL    Hematocrit 34.2 (L) 40.0 - 53.0 %    MCV 93 78 - 100 fl    MCH 30.3 26.5 - 33.0 pg    MCHC 32.5 31.5 - 36.5 g/dL    RDW 13.6 10.0 -  15.0 %    Platelet Count 239 150 - 450 10e9/L   Comprehensive metabolic panel   Result Value Ref Range    Sodium 143 133 - 144 mmol/L    Potassium 4.1 3.4 - 5.3 mmol/L    Chloride 114 (H) 94 - 109 mmol/L    Carbon Dioxide 23 20 - 32 mmol/L    Anion Gap 6 3 - 14 mmol/L    Glucose 147 (H) 70 - 99 mg/dL    Urea Nitrogen 18 7 - 30 mg/dL    Creatinine 1.01 0.66 - 1.25 mg/dL    GFR Estimate 88 >60 mL/min/[1.73_m2]    GFR Estimate If Black >90 >60 mL/min/[1.73_m2]    Calcium 9.7 8.5 - 10.1 mg/dL    Bilirubin Total 0.5 0.2 - 1.3 mg/dL    Albumin 2.7 (L) 3.4 - 5.0 g/dL    Protein Total 5.5 (L) 6.8 - 8.8 g/dL    Alkaline Phosphatase 76 40 - 150 U/L    ALT 50 0 - 70 U/L    AST 43 0 - 45 U/L   Fibrinogen activity   Result Value Ref Range    Fibrinogen 466 (H) 200 - 420 mg/dL   INR   Result Value Ref Range    INR 1.63 (H) 0.86 - 1.14   Partial thromboplastin time   Result Value Ref Range    PTT 37 22 - 37 sec   Glucose by meter   Result Value Ref Range    Glucose 136 (H) 70 - 99 mg/dL   Glucose by meter   Result Value Ref Range    Glucose 129 (H) 70 - 99 mg/dL

## 2019-03-12 NOTE — BRIEF OP NOTE
[facility]    Brief Operative Note    Pre-operative diagnosis: CORONARY ARTERY DISEASE  Post-operative diagnosis * No post-op diagnosis entered *  Procedure: Procedure(s):  CORONARY ARTERY BYPASS GRAFTING x 4 (LIMA - LAD; SV - OM; SV - PDA; SV - DIAGONAL) WITH ENDOSCOPIC VEIN HARVEST ON THE LEFT LOWER EXTREMITY    (ON PUMP OXYGENATOR ; NANCY BY BRENNEN)  Surgeon: Surgeon(s) and Role:     * Mike Rincon MD - Primary     * Rachel Zavala PA-C - Assisting     * Clarence Carballo MD - Assisting  Anesthesia: General   Estimated blood loss: 500 ml  Drains: Chest tubes  Specimens: * No specimens in log *  Findings:   see op note.  Complications: None.  Implants: None.

## 2019-03-13 ENCOUNTER — APPOINTMENT (OUTPATIENT)
Dept: GENERAL RADIOLOGY | Facility: CLINIC | Age: 49
DRG: 234 | End: 2019-03-13
Attending: STUDENT IN AN ORGANIZED HEALTH CARE EDUCATION/TRAINING PROGRAM
Payer: COMMERCIAL

## 2019-03-13 ENCOUNTER — APPOINTMENT (OUTPATIENT)
Dept: PHYSICAL THERAPY | Facility: CLINIC | Age: 49
DRG: 234 | End: 2019-03-13
Attending: STUDENT IN AN ORGANIZED HEALTH CARE EDUCATION/TRAINING PROGRAM
Payer: COMMERCIAL

## 2019-03-13 LAB
ALBUMIN SERPL-MCNC: 2.7 G/DL (ref 3.4–5)
ALP SERPL-CCNC: 68 U/L (ref 40–150)
ALT SERPL W P-5'-P-CCNC: 46 U/L (ref 0–70)
ANION GAP SERPL CALCULATED.3IONS-SCNC: 5 MMOL/L (ref 3–14)
AST SERPL W P-5'-P-CCNC: 39 U/L (ref 0–45)
BILIRUB SERPL-MCNC: 0.4 MG/DL (ref 0.2–1.3)
BLD PROD TYP BPU: NORMAL
BLD PROD TYP BPU: NORMAL
BLD UNIT ID BPU: 0
BLD UNIT ID BPU: 0
BLOOD PRODUCT CODE: NORMAL
BLOOD PRODUCT CODE: NORMAL
BPU ID: NORMAL
BPU ID: NORMAL
BUN SERPL-MCNC: 20 MG/DL (ref 7–30)
CA-I BLD-MCNC: 4.7 MG/DL (ref 4.4–5.2)
CALCIUM SERPL-MCNC: 8 MG/DL (ref 8.5–10.1)
CHLORIDE SERPL-SCNC: 111 MMOL/L (ref 94–109)
CO2 SERPL-SCNC: 26 MMOL/L (ref 20–32)
CREAT SERPL-MCNC: 1.13 MG/DL (ref 0.66–1.25)
ERYTHROCYTE [DISTWIDTH] IN BLOOD BY AUTOMATED COUNT: 13.8 % (ref 10–15)
GFR SERPL CREATININE-BSD FRML MDRD: 76 ML/MIN/{1.73_M2}
GLUCOSE BLDC GLUCOMTR-MCNC: 117 MG/DL (ref 70–99)
GLUCOSE BLDC GLUCOMTR-MCNC: 119 MG/DL (ref 70–99)
GLUCOSE BLDC GLUCOMTR-MCNC: 125 MG/DL (ref 70–99)
GLUCOSE BLDC GLUCOMTR-MCNC: 126 MG/DL (ref 70–99)
GLUCOSE BLDC GLUCOMTR-MCNC: 127 MG/DL (ref 70–99)
GLUCOSE BLDC GLUCOMTR-MCNC: 132 MG/DL (ref 70–99)
GLUCOSE BLDC GLUCOMTR-MCNC: 135 MG/DL (ref 70–99)
GLUCOSE BLDC GLUCOMTR-MCNC: 137 MG/DL (ref 70–99)
GLUCOSE BLDC GLUCOMTR-MCNC: 146 MG/DL (ref 70–99)
GLUCOSE SERPL-MCNC: 119 MG/DL (ref 70–99)
HCT VFR BLD AUTO: 36.1 % (ref 40–53)
HGB BLD-MCNC: 11.8 G/DL (ref 13.3–17.7)
MAGNESIUM SERPL-MCNC: 2.3 MG/DL (ref 1.6–2.3)
MAGNESIUM SERPL-MCNC: 2.3 MG/DL (ref 1.6–2.3)
MCH RBC QN AUTO: 30.6 PG (ref 26.5–33)
MCHC RBC AUTO-ENTMCNC: 32.7 G/DL (ref 31.5–36.5)
MCV RBC AUTO: 94 FL (ref 78–100)
PHOSPHATE SERPL-MCNC: 3.8 MG/DL (ref 2.5–4.5)
PLATELET # BLD AUTO: 242 10E9/L (ref 150–450)
POTASSIUM SERPL-SCNC: 4.4 MMOL/L (ref 3.4–5.3)
POTASSIUM SERPL-SCNC: 4.5 MMOL/L (ref 3.4–5.3)
PROT SERPL-MCNC: 5.7 G/DL (ref 6.8–8.8)
RBC # BLD AUTO: 3.86 10E12/L (ref 4.4–5.9)
SODIUM SERPL-SCNC: 142 MMOL/L (ref 133–144)
TRANSFUSION STATUS PATIENT QL: NORMAL
WBC # BLD AUTO: 12.7 10E9/L (ref 4–11)

## 2019-03-13 PROCEDURE — 25000128 H RX IP 250 OP 636: Performed by: STUDENT IN AN ORGANIZED HEALTH CARE EDUCATION/TRAINING PROGRAM

## 2019-03-13 PROCEDURE — C9113 INJ PANTOPRAZOLE SODIUM, VIA: HCPCS | Performed by: STUDENT IN AN ORGANIZED HEALTH CARE EDUCATION/TRAINING PROGRAM

## 2019-03-13 PROCEDURE — 36415 COLL VENOUS BLD VENIPUNCTURE: CPT | Performed by: STUDENT IN AN ORGANIZED HEALTH CARE EDUCATION/TRAINING PROGRAM

## 2019-03-13 PROCEDURE — 97161 PT EVAL LOW COMPLEX 20 MIN: CPT | Mod: GP

## 2019-03-13 PROCEDURE — 20000003 ZZH R&B ICU

## 2019-03-13 PROCEDURE — 25000132 ZZH RX MED GY IP 250 OP 250 PS 637: Performed by: HOSPITALIST

## 2019-03-13 PROCEDURE — 25000128 H RX IP 250 OP 636: Performed by: PHYSICIAN ASSISTANT

## 2019-03-13 PROCEDURE — 85027 COMPLETE CBC AUTOMATED: CPT | Performed by: STUDENT IN AN ORGANIZED HEALTH CARE EDUCATION/TRAINING PROGRAM

## 2019-03-13 PROCEDURE — 93010 ELECTROCARDIOGRAM REPORT: CPT | Performed by: INTERNAL MEDICINE

## 2019-03-13 PROCEDURE — 25000132 ZZH RX MED GY IP 250 OP 250 PS 637: Performed by: STUDENT IN AN ORGANIZED HEALTH CARE EDUCATION/TRAINING PROGRAM

## 2019-03-13 PROCEDURE — 25000125 ZZHC RX 250: Performed by: STUDENT IN AN ORGANIZED HEALTH CARE EDUCATION/TRAINING PROGRAM

## 2019-03-13 PROCEDURE — 83735 ASSAY OF MAGNESIUM: CPT | Performed by: STUDENT IN AN ORGANIZED HEALTH CARE EDUCATION/TRAINING PROGRAM

## 2019-03-13 PROCEDURE — 71045 X-RAY EXAM CHEST 1 VIEW: CPT

## 2019-03-13 PROCEDURE — 97530 THERAPEUTIC ACTIVITIES: CPT | Mod: GP

## 2019-03-13 PROCEDURE — 93005 ELECTROCARDIOGRAM TRACING: CPT

## 2019-03-13 PROCEDURE — 80053 COMPREHEN METABOLIC PANEL: CPT | Performed by: STUDENT IN AN ORGANIZED HEALTH CARE EDUCATION/TRAINING PROGRAM

## 2019-03-13 PROCEDURE — 84132 ASSAY OF SERUM POTASSIUM: CPT | Performed by: STUDENT IN AN ORGANIZED HEALTH CARE EDUCATION/TRAINING PROGRAM

## 2019-03-13 PROCEDURE — 25000128 H RX IP 250 OP 636: Performed by: NURSE PRACTITIONER

## 2019-03-13 PROCEDURE — 82330 ASSAY OF CALCIUM: CPT | Performed by: STUDENT IN AN ORGANIZED HEALTH CARE EDUCATION/TRAINING PROGRAM

## 2019-03-13 PROCEDURE — 00000146 ZZHCL STATISTIC GLUCOSE BY METER IP

## 2019-03-13 PROCEDURE — 84100 ASSAY OF PHOSPHORUS: CPT | Performed by: STUDENT IN AN ORGANIZED HEALTH CARE EDUCATION/TRAINING PROGRAM

## 2019-03-13 PROCEDURE — 99233 SBSQ HOSP IP/OBS HIGH 50: CPT | Performed by: INTERNAL MEDICINE

## 2019-03-13 PROCEDURE — 25000125 ZZHC RX 250: Performed by: INTERNAL MEDICINE

## 2019-03-13 RX ORDER — PREDNISONE 10 MG/1
20 TABLET ORAL DAILY
Status: COMPLETED | OUTPATIENT
Start: 2019-03-14 | End: 2019-03-15

## 2019-03-13 RX ORDER — POLYETHYLENE GLYCOL 3350 17 G/17G
17 POWDER, FOR SOLUTION ORAL 2 TIMES DAILY
Status: DISCONTINUED | OUTPATIENT
Start: 2019-03-13 | End: 2019-03-18 | Stop reason: HOSPADM

## 2019-03-13 RX ORDER — KETOROLAC TROMETHAMINE 30 MG/ML
30 INJECTION, SOLUTION INTRAMUSCULAR; INTRAVENOUS ONCE
Status: COMPLETED | OUTPATIENT
Start: 2019-03-13 | End: 2019-03-13

## 2019-03-13 RX ORDER — FUROSEMIDE 10 MG/ML
20 INJECTION INTRAMUSCULAR; INTRAVENOUS ONCE
Status: COMPLETED | OUTPATIENT
Start: 2019-03-13 | End: 2019-03-13

## 2019-03-13 RX ORDER — DEXTROSE MONOHYDRATE 25 G/50ML
25-50 INJECTION, SOLUTION INTRAVENOUS
Status: DISCONTINUED | OUTPATIENT
Start: 2019-03-13 | End: 2019-03-13

## 2019-03-13 RX ORDER — KETOROLAC TROMETHAMINE 15 MG/ML
15 INJECTION, SOLUTION INTRAMUSCULAR; INTRAVENOUS EVERY 6 HOURS PRN
Status: COMPLETED | OUTPATIENT
Start: 2019-03-13 | End: 2019-03-14

## 2019-03-13 RX ORDER — NICOTINE POLACRILEX 4 MG
15-30 LOZENGE BUCCAL
Status: DISCONTINUED | OUTPATIENT
Start: 2019-03-13 | End: 2019-03-13

## 2019-03-13 RX ADMIN — FENTANYL CITRATE 100 MCG: 50 INJECTION, SOLUTION INTRAMUSCULAR; INTRAVENOUS at 23:06

## 2019-03-13 RX ADMIN — CEFAZOLIN SODIUM 2 G: 2 INJECTION, SOLUTION INTRAVENOUS at 15:06

## 2019-03-13 RX ADMIN — MUPIROCIN 0.5 G: 20 OINTMENT TOPICAL at 20:34

## 2019-03-13 RX ADMIN — LEVOTHYROXINE SODIUM 50 MCG: 50 TABLET ORAL at 09:04

## 2019-03-13 RX ADMIN — KETOROLAC TROMETHAMINE 30 MG: 30 INJECTION, SOLUTION INTRAMUSCULAR at 09:32

## 2019-03-13 RX ADMIN — ACETAMINOPHEN 975 MG: 325 TABLET, FILM COATED ORAL at 06:20

## 2019-03-13 RX ADMIN — CEFAZOLIN SODIUM 2 G: 2 INJECTION, SOLUTION INTRAVENOUS at 07:03

## 2019-03-13 RX ADMIN — PANTOPRAZOLE SODIUM 40 MG: 40 INJECTION, POWDER, FOR SOLUTION INTRAVENOUS at 09:10

## 2019-03-13 RX ADMIN — OXYCODONE HYDROCHLORIDE 10 MG: 5 TABLET ORAL at 02:24

## 2019-03-13 RX ADMIN — GABAPENTIN 300 MG: 300 CAPSULE ORAL at 21:12

## 2019-03-13 RX ADMIN — LOSARTAN POTASSIUM 25 MG: 25 TABLET ORAL at 09:04

## 2019-03-13 RX ADMIN — ACETAMINOPHEN 975 MG: 325 TABLET, FILM COATED ORAL at 15:02

## 2019-03-13 RX ADMIN — LIDOCAINE 1 PATCH: 560 PATCH PERCUTANEOUS; TOPICAL; TRANSDERMAL at 08:35

## 2019-03-13 RX ADMIN — ACETAMINOPHEN 975 MG: 325 TABLET, FILM COATED ORAL at 22:30

## 2019-03-13 RX ADMIN — PREDNISONE 40 MG: 20 TABLET ORAL at 09:03

## 2019-03-13 RX ADMIN — METHOCARBAMOL TABLETS 750 MG: 750 TABLET, COATED ORAL at 19:24

## 2019-03-13 RX ADMIN — KETOROLAC TROMETHAMINE 15 MG: 15 INJECTION, SOLUTION INTRAMUSCULAR; INTRAVENOUS at 18:52

## 2019-03-13 RX ADMIN — HYDRALAZINE HYDROCHLORIDE 10 MG: 20 INJECTION INTRAMUSCULAR; INTRAVENOUS at 07:03

## 2019-03-13 RX ADMIN — OXYCODONE HYDROCHLORIDE 10 MG: 5 TABLET ORAL at 05:21

## 2019-03-13 RX ADMIN — OXYCODONE HYDROCHLORIDE 10 MG: 5 TABLET ORAL at 08:43

## 2019-03-13 RX ADMIN — AMLODIPINE BESYLATE 5 MG: 5 TABLET ORAL at 10:53

## 2019-03-13 RX ADMIN — GABAPENTIN 300 MG: 300 CAPSULE ORAL at 09:32

## 2019-03-13 RX ADMIN — METHOCARBAMOL TABLETS 750 MG: 750 TABLET, COATED ORAL at 04:25

## 2019-03-13 RX ADMIN — HEPARIN SODIUM 5000 UNITS: 5000 INJECTION, SOLUTION INTRAVENOUS; SUBCUTANEOUS at 13:01

## 2019-03-13 RX ADMIN — MUPIROCIN 0.5 G: 20 OINTMENT TOPICAL at 11:07

## 2019-03-13 RX ADMIN — METOPROLOL TARTRATE 25 MG: 25 TABLET ORAL at 10:53

## 2019-03-13 RX ADMIN — ATORVASTATIN CALCIUM 40 MG: 40 TABLET, FILM COATED ORAL at 22:30

## 2019-03-13 RX ADMIN — HEPARIN SODIUM 5000 UNITS: 5000 INJECTION, SOLUTION INTRAVENOUS; SUBCUTANEOUS at 20:29

## 2019-03-13 RX ADMIN — FUROSEMIDE 20 MG: 10 INJECTION, SOLUTION INTRAVENOUS at 19:52

## 2019-03-13 ASSESSMENT — ACTIVITIES OF DAILY LIVING (ADL)
ADLS_ACUITY_SCORE: 14
ADLS_ACUITY_SCORE: 14
ADLS_ACUITY_SCORE: 15
ADLS_ACUITY_SCORE: 14

## 2019-03-13 ASSESSMENT — MIFFLIN-ST. JEOR: SCORE: 2452.63

## 2019-03-13 NOTE — PROGRESS NOTES
Dr. Carballo notified of patient having 2.8 and 3 second pause on tele. Pt denies symptoms. TORB for potassium and magnesium check. Hook up pacer to back up rate of 50. MD will round on patient after surgery.

## 2019-03-13 NOTE — PROGRESS NOTES
03/13/19 1400   Quick Adds   Type of Visit Initial PT Evaluation   Living Environment   Lives With alone   Living Arrangements condominium   Home Accessibility no concerns   Self-Care   Usual Activity Tolerance good   Current Activity Tolerance fair   Equipment Currently Used at Home none   Functional Level Prior   Ambulation 0-->independent   Transferring 0-->independent   Toileting 0-->independent   Bathing 0-->independent   Communication 0-->understands/communicates without difficulty   Swallowing 0-->swallows foods/liquids without difficulty   Cognition 0 - no cognition issues reported   Fall history within last six months no   Which of the above functional risks had a recent onset or change? ambulation;transferring   General Information   Onset of Illness/Injury or Date of Surgery - Date 03/06/19   Referring Physician Clarence Carballo MD   Patient/Family Goals Statement Discharge to [facility]    Pertinent History of Current Problem (include personal factors and/or comorbidities that impact the POC) Patient admitted on 3/6/19 for evaluation of anterior chest pain; patient noted to have elevated troponin and admitted for NSTEMI. Cardiac cath revealed multivessel disease; patient is now POD-1 from CABG x4 to  LIMA-LAD, SVG-DIAG, SVG-OM, SVG-LPDA. Patient with PMH of HTN.    Precautions/Limitations fall precautions;sternal precautions   Weight-Bearing Status - LLE full weight-bearing   Weight-Bearing Status - RLE full weight-bearing   Heart Disease Risk Factors High blood pressure;Overweight   General Observations Patient sitting in chair upon arrival of therapist, pleasant and agreeable to working with PT    Cognitive Status Examination   Orientation orientation to person, place and time   Level of Consciousness alert   Follows Commands and Answers Questions 100% of the time;able to follow multistep instructions   Pain Assessment   Patient Currently in Pain No   Integumentary/Edema   Integumentary/Edema Comments  "Midline sternal incision noted   Posture    Posture Forward head position   Range of Motion (ROM)   ROM Comment B LE ROM WNL    Strength   Strength Comments At least 3+/5 with functional transfers and gait    Bed Mobility   Bed Mobility Comments Sit>supine with Mod A x 2    Transfer Skills   Transfer Comments Sit>stand with no AD and CGA    Gait   Gait Comments Able to take a few step from chair to bed with Min A for management of lines   Balance   Balance Comments Bracing backs of legs against chair during stand and needing A to correct, otherwise steady throughout    General Therapy Interventions   Planned Therapy Interventions gait training;home program guidelines;bed mobility training;transfer training;risk factor education;progressive activity/exercise   Clinical Impression   Criteria for Skilled Therapeutic Intervention yes, treatment indicated   PT Diagnosis Impaired mobility and ambulation s/p CABG x 4    Influenced by the following impairments pain, weakness, decreased tolerance to activity    Functional limitations due to impairments bed mobility, transfers, gait, stairs   Clinical Presentation Stable/Uncomplicated   Clinical Presentation Rationale Based on PMH, current presentation, and social support    Clinical Decision Making (Complexity) Low complexity   Therapy Frequency` 2 times/day   Predicted Duration of Therapy Intervention (days/wks) 3 days    Anticipated Discharge Disposition Transitional Care Facility   Risk & Benefits of therapy have been explained Yes   Patient, Family & other staff in agreement with plan of care Yes   [facility] [employer] \"6 Clicks\"   2016, Trustees of [facility], under license to [employer].  All rights reserved.   6 Clicks Short Forms Basic Mobility Inpatient Short Form   [facility] [employer]  \"6 Clicks\" V.2 Basic Mobility Inpatient Short Form   1. Turning from your back to your side while in a flat bed without using bedrails? 3 - A Little   2. Moving " from lying on your back to sitting on the side of a flat bed without using bedrails? 2 - A Lot   3. Moving to and from a bed to a chair (including a wheelchair)? 3 - A Little   4. Standing up from a chair using your arms (e.g., wheelchair, or bedside chair)? 3 - A Little   5. To walk in hospital room? 3 - A Little   6. Climbing 3-5 steps with a railing? 2 - A Lot   Basic Mobility Raw Score (Score out of 24.Lower scores equate to lower levels of function) 16   Total Evaluation Time   Total Evaluation Time (Minutes) 8

## 2019-03-13 NOTE — PLAN OF CARE
Pt A&Ox4, progressing per pathway. Pain remained between a stated rating of 5-7 out of 10. Taking PO analgesics. NSR, no pacing required. Chest tubes draining around 20ml/hr. 2L NC. CVP 15-18. IS effort fair. NG, R radial art, and triple lumen CVC removed. Caraballo w/ borderline output. Blood sugars controlled w/ insulin gtt. Tolerated up to chair last evening. Plan to continue to mobilize.     Delio Mattson    3/13 0648

## 2019-03-13 NOTE — PROGRESS NOTES
[facility]    Hospitalist Progress Note      Assessment & Plan   Edilberto Brooks is a 48 year old male with hx of HTN, who presented to the emergency room with anterior chest pressure. He is noted to have elevated troponin and admitted for NSTEMI.  Cardiac cath revealed multivessel disease and s/p CABG x 4 on 3/12     Non-ST-elevation myocardial infarction   CAD s/p CABGx4 (LIMA-LAD, SVG-DIAG, SVG-OM, SVG-LPDA) on 3/12  Patient presented with chest pain. Trop noted to be elevated(0.036--->0.255---1.771). TTE showed EF of 55-60%, no WMA. Coronary angiogram on 3/7 reveals multivessel disease. He is now s/p CABG.    - routine post op care per CV surgery  - Continue aspirin, Metoprolol, Losartan, lipitor  - chest tubes in place      Hypertension/ hyperlipidemia  - meds as above. PTA Dyazide was stopped on admission due to hypokalemia.      Suspected Right ankle gout. improved  Patient with ankle pain and swelling. No erythema around the joint. Stared on colchicine on 3/10. Pain feels slightly better. Ortho evaluated and felt likely gout. Given drug interaction in statin and colchicine, colchicine stopped on 3/11 and prednisone started.   - pain is improved with good range of motion, decrease Prednisone to 20mg daily for 2 more doses, then stop    Prediabetes  Post op hyperglycemia:  A1c 6.3%. Not on meds.  - diet, wt loss and life style modifications advised  - insulin post op per protocol    Per social work note, patient's sister concerned about depression and requested psych consult. Per discussion with patient on 3/13, he does not feel depressed. He denies suicidal thoughts.  Deferring pscy consult at this time. Will attempt to discuss with sister as well if able.        DVT Prophylaxis: Heparin sq  Code Status: Full Code    Disposition: Expected discharge pending post-op course per CV surgery    Violeta Dennis MD  Text Page  (7am to 6pm)    Interval History   C/o chest wall pain.  toradol added and  helping.   He reports his right ankle is much better and able to move it easily.decreasing swelling as well.     Per SW note, sister concerned about depression and psych consult. Spoke with patient. Patient reports he does not feel depressed. He denies suicidal or homicidal ideation.     -Data reviewed today: I reviewed all new labs and imaging results over the last 24 hours. I personally reviewed no images or EKG's today.    Physical Exam   Temp: 99.9  F (37.7  C) Temp src: Bladder BP: 125/70 Pulse: 83 Heart Rate: 78 Resp: 18 SpO2: 95 % O2 Device: Nasal cannula Oxygen Delivery: 2 LPM  Vitals:    03/11/19 0659 03/12/19 0527 03/13/19 0500   Weight: 147.2 kg (324 lb 9.6 oz) 145.9 kg (321 lb 9.6 oz) 149.2 kg (328 lb 14.8 oz)     Vital Signs with Ranges  Temp:  [97  F (36.1  C)-100.6  F (38.1  C)] 99.9  F (37.7  C)  Pulse:  [77-92] 83  Heart Rate:  [77-90] 78  Resp:  [11-34] 18  BP: ()/(30-96) 125/70  MAP:  [68 mmHg-98 mmHg] 77 mmHg  Arterial Line BP: ()/(54-91) 105/60  FiO2 (%):  [40 %-60 %] 40 %  SpO2:  [83 %-100 %] 95 %  I/O last 3 completed shifts:  In: 6509.87 [P.O.:480; I.V.:5594.87; Other:435]  Out: 2248 [Urine:1153; Blood:725; Chest Tube:370]    Constitutional: Alert, awake and no apparent distress  Respiratory: Clear to auscultation bialterally anterior  Cardiovascular: regular rate and rhythm  GI: soft and non tender  Skin/Integumen: warm, psoriatic plaques noted on arm and legs,left leg vein harvest site appers clean and dry, sternal incision also clean and dry  Other:right ankle with with good ROM without pain    Medications     dexmedetomidine Stopped (03/12/19 2035)     IV fluid REPLACEMENT ONLY       dextrose 5% and 0.45% NaCl + KCl 20 mEq/L 30 mL/hr at 03/12/19 2000     dextrose 5% and 0.45% NaCl       EPINEPHrine IV infusion ADULT       insulin (regular) 0.5 Units/hr (03/13/19 0902)     nitroGLYcerin Stopped (03/12/19 2130)     phenylephrine       sodium chloride 20 mL/hr at 03/12/19 2000        acetaminophen  975 mg Oral Q8H     amLODIPine  5 mg Oral Daily     aspirin  81 mg Oral Daily     atorvastatin  40 mg Oral At Bedtime     ceFAZolin  2 g Intravenous Q8H     gabapentin  300 mg Oral BID     heparin  5,000 Units Subcutaneous Q8H     levothyroxine  50 mcg Oral Daily     lidocaine  1 patch Transdermal Q24H     lidocaine   Transdermal Q8H     lidocaine   Transdermal Q24h     losartan  25 mg Oral Daily     metoprolol tartrate  25 mg Oral Q12H    Or     metoprolol  25 mg Per NG tube Q12H     mupirocin  0.5 g Both Nostrils BID     pantoprazole (PROTONIX) IV  40 mg Intravenous Daily     [START ON 3/14/2019] predniSONE  20 mg Oral Daily     senna-docusate  1 tablet Oral BID    Or     senna-docusate  2 tablet Oral BID     sodium chloride (PF)  3 mL Intracatheter Q8H     sodium chloride (PF)  3 mL Intracatheter Q8H       Data   Recent Labs   Lab 03/13/19  0420 03/12/19  1750 03/12/19  1439 03/12/19  1436  03/07/19  0539 03/06/19  2138 03/06/19 2017   WBC 12.7* 16.5*  --  28.8*   < >  --   --  12.8*   HGB 11.8* 12.4* 11.2* 11.1*   < >  --   --  15.7   MCV 94 93  --  93   < >  --   --  90    224  --  239   < > 315  --  361   INR  --  1.24*  --  1.63*  --   --   --   --     144 142 143   < > 137  --  135   POTASSIUM 4.4 5.0 4.0 4.1   < > 3.3*  --  2.6*   CHLORIDE 111* 113*  --  114*   < > 99  --  97   CO2 26 23  --  23   < > 31  --  28   BUN 20 20  --  18   < > 17  --  19   CR 1.13 0.98  --  1.01   < > 1.17  --  1.17   ANIONGAP 5 8  --  6   < > 7  --  10   KAREN 8.0* 8.4*  --  9.7   < > 8.7  --  8.6   * 136*  --  147*   < > 97  --  130*   ALBUMIN 2.7* 2.8*  --  2.7*   < >  --   --   --    PROTTOTAL 5.7* 5.8*  --  5.5*   < >  --   --   --    BILITOTAL 0.4 0.6  --  0.5   < >  --   --   --    ALKPHOS 68 74  --  76   < >  --   --   --    ALT 46 54  --  50   < >  --   --   --    AST 39 57*  --  43   < >  --   --   --    TROPI  --   --   --   --   --  1.771* 0.255* 0.036    < > = values in this  interval not displayed.       Recent Results (from the past 24 hour(s))   XR Chest Port 1 View    Narrative    XR CHEST PORT 1 VW  3/12/2019 5:27 PM     HISTORY:  post op CABG    COMPARISON: Chest x-ray dated 3/6/2019    FINDINGS:  Patient is status post a midline sternotomy. ET tube and NG  tube are in place. Mediastinal chest tubes are noted. Right jugular  venous catheter. No pneumothorax. The lungs are clear.      Impression    IMPRESSION: Postop cardiac surgery.    RIAZ WOODS MD

## 2019-03-13 NOTE — PROGRESS NOTES
"CLINICAL NUTRITION SERVICES  -  ASSESSMENT NOTE      Future/Additional Recommendations:   Once diet advanced, will offer protein supplements for increased nutrient needs and heart healthy diet guidelines p/t discharge    Malnutrition:   % Weight Loss:  None noted  % Intake:  </= 50% for >/= 5 days (severe malnutrition)  Subcutaneous Fat Loss:  None observed  Muscle Loss:  None observed  Fluid Retention:  None noted    Malnutrition Diagnosis: Patient does not meet two of the above criteria necessary for diagnosing malnutrition          REASON FOR ASSESSMENT  Edilberto Brooks is a 48 year old male seen by Registered Dietitian for [facility]      NUTRITION HISTORY  - Information obtained from patient:  - Regular diet at baseline, good appetite and meals BID to TID  - Pt lives alone in an apartment after a recent divorce   - Reports that cooking for 1 is difficult and \"a lot of effort.\" Usual meals may include a bag of [employer] rice with canned chicken or tuna and seasonings, or beer brats, or salad kits  - Over the past 3 months, pt reports that he had been trying to cut back on the amount of food he's eating per meal    - No known food allergies     CURRENT NUTRITION ORDERS  Diet Order:     NPO     Current Intake/Tolerance:  Pt intermittently NPO/heart healthy diet x 7 days this admit. Per review of meal ordering system, pt had ordered a total of 5 meals over the past 7 days (1 meal per day between 3/7 and 3/11). Intakes likely less than 50% over the past 5+ days.     Reports diarrhea daily since admission and poor appetite     3/12: CABG x 4 --> Extubated 3/12 at 2023      NUTRITION FOCUSED PHYSICAL ASSESSMENT (NFPA)  Completed:        Yes:          Full assessment  Observed  No nutrition-related physical findings observed  Obtained from Chart/Interdisciplinary Team  None noted    ANTHROPOMETRICS  Height: 6' 3\"  Weight: 145.9 kg (3/12)  Body mass index is 40.2 kg/m .  Weight Status:  Obesity Grade III BMI >40  IBW: 89.1 kg   % " IBW: 164%  Weight History: Weight trending not on file to evaluate     LABS  Labs reviewed  Recent Labs   Lab 03/13/19  0623 03/13/19  0420 03/13/19  0419 03/13/19  0220 03/13/19  0117 03/13/19  0016 03/12/19  2243  03/12/19  1750  03/12/19  1436  03/11/19  0607 03/07/19  0539 03/06/19 2017   GLC  --  119*  --   --   --   --   --   --  136*  --  147*  --  94 97 130*   *  --  127* 135* 132* 137* 130*   < >  --    < >  --    < >  --   --   --     < > = values in this interval not displayed.       MEDICATIONS  Medications reviewed    ASSESSED NUTRITION NEEDS PER APPROVED PRACTICE GUIDELINES:    Dosing Weight 145.9 kg (actual for energy); 89 kg (IBW for protein)  Estimated Energy Needs: 0924-7675 kcals (11-14 Kcal/Kg)  Justification: obese  Estimated Protein Needs: 135-180 grams protein (1.5-2 g pro/Kg)  Justification: post-op and obesity guidelines     MALNUTRITION:  % Weight Loss:  None noted  % Intake:  </= 50% for >/= 5 days (severe malnutrition)  Subcutaneous Fat Loss:  None observed  Muscle Loss:  None observed  Fluid Retention:  None noted    Malnutrition Diagnosis: Patient does not meet two of the above criteria necessary for diagnosing malnutrition      NUTRITION DIAGNOSIS:  Inadequate oral intake related to NPO s/p cardiac surgery as evidenced by no intakes today and <50% intakes throughout 7 days admitted       NUTRITION INTERVENTIONS  Recommendations / Nutrition Prescription  ADAT    Once diet advanced, will offer protein supplements for increased nutrient needs       Implementation  Nutrition education: Per Provider order if indicated. Provided brief review on heart healthy diet per pt request. Disucssed processed meats and processed foods to limit. Advised pt that we will provide heart healthy diet guidelines prior to discharge.   Collaboration and Referral of Nutrition care- discussed pt during ICU rounds     Nutrition Goals  Diet adv >[place] within 48 hrs       MONITORING AND EVALUATION:  Progress  towards goals will be monitored and evaluated per protocol and Practice Guidelines        Rosalind Cruz RD, LD  Clinical Dietitian

## 2019-03-13 NOTE — PROGRESS NOTES
[facility]  Cardiovascular and Thoracic Surgery Daily Note          Assessment and Plan:   POD#1 s/p Coronary artery bypass grafting x4, LIMA-LAD, SVG-DIAG, SVG-OM, SVG-LPDA; endoscopic harvest left greater saphenous vein by Dr. Freddy Rincon  -CVS: HR: 70-80s, SBP: 130s/70s, ASA, BB, statin, sub q heparin  -Resp: Extubated within 24 hours of surgery, sating well on 2L, encourage IS  -Neuro: grossly intact, pain controlled with current regimen  -Renal: good UO, Crea: 1.13, down 2 kg below pre-op weight, albumin available  -GI:  Continue bowel regimen, miralax added  -:  Caraballo in place, limited mobility, accurate Is & Os  -Endo: transition to SSI, pre-op A1C 6.3  -FEN: replete lytes as needed, Na: 142, K: 4.4, Orders Placed This Encounter      NPO for Medical/Clinical Reasons Except for: No Exceptions      Advance Diet as Tolerated: Clear Liquid Diet; Regular Diet Adult    -ID: WBC: 12.7, Temp (24hrs), Av.8  F (37.1  C), Min:97  F (36.1  C), Max:100.6  F (38.1  C), completing dione-op abx  -Heme: Hgb: 11.8, plt: 242  -Proph: PCD, ASA, BB, statin, sub q heparin  -Dispo: ICU-->St [address], initiate therapies, encourage IS          Interval History:   Sitting up in bed, upset about weaning off vent-anxiety, feeling SOB, will start clears          Medications:       acetaminophen  975 mg Oral Q8H     amLODIPine  5 mg Oral Daily     aspirin  81 mg Oral Daily     atorvastatin  40 mg Oral At Bedtime     ceFAZolin  2 g Intravenous Q8H     gabapentin  300 mg Oral BID     heparin  5,000 Units Subcutaneous Q8H     levothyroxine  50 mcg Oral Daily     lidocaine  1 patch Transdermal Q24H     lidocaine   Transdermal Q8H     lidocaine   Transdermal Q24h     losartan  25 mg Oral Daily     metoprolol tartrate  25 mg Oral Q12H    Or     metoprolol  25 mg Per NG tube Q12H     mupirocin  0.5 g Both Nostrils BID     pantoprazole (PROTONIX) IV  40 mg Intravenous Daily     [START ON 3/14/2019] predniSONE  20 mg Oral Daily      "senna-docusate  1 tablet Oral BID    Or     senna-docusate  2 tablet Oral BID     sodium chloride (PF)  3 mL Intracatheter Q8H     sodium chloride (PF)  3 mL Intracatheter Q8H     [START ON 3/15/2019] acetaminophen, albumin human, bisacodyl, IV fluid REPLACEMENT ONLY, glucose **OR** dextrose **OR** glucagon, fentaNYL, guaiFENesin, hydrALAZINE, ipratropium - albuterol 0.5 mg/2.5 mg/3 mL, [COMPLETED] ketorolac **FOLLOWED BY** ketorolac, lidocaine 4%, lidocaine 4%, lidocaine (buffered or not buffered), lidocaine (buffered or not buffered), magnesium hydroxide, magnesium sulfate, magnesium sulfate, melatonin, meperidine, methocarbamol, metoclopramide **OR** metoclopramide, naloxone, ondansetron **OR** ondansetron, oxyCODONE, potassium chloride, potassium chloride with lidocaine, potassium chloride, potassium chloride, potassium chloride, prochlorperazine **OR** prochlorperazine **OR** prochlorperazine, senna-docusate **OR** senna-docusate, sodium chloride (PF), sodium phosphate, sodium phosphate, sodium phosphate, sodium phosphate          Physical Exam:   Vitals were reviewed  Blood pressure 125/70, pulse 83, temperature 99.9  F (37.7  C), temperature source Bladder, resp. rate 18, height 1.905 m (6' 3\"), weight 149.2 kg (328 lb 14.8 oz), SpO2 95 %.  Rhythm: NSR/tachy    Lungs: course    Cardiovascular: s1/s2, no m/r/g    Abdomen: s/nt/nd    Extremeties: no LE edema    Incision: cdi    CT: to suction    Weight:   Vitals:    03/08/19 0638 03/09/19 0556 03/11/19 0659 03/12/19 0527   Weight: 147.9 kg (326 lb 1.6 oz) 148.1 kg (326 lb 8 oz) 147.2 kg (324 lb 9.6 oz) 145.9 kg (321 lb 9.6 oz)    03/13/19 0500   Weight: 149.2 kg (328 lb 14.8 oz)            Data:   Labs:   Lab Results   Component Value Date    WBC 12.7 03/13/2019     Lab Results   Component Value Date    RBC 3.86 03/13/2019     Lab Results   Component Value Date    HGB 11.8 03/13/2019     Lab Results   Component Value Date    HCT 36.1 03/13/2019     No " components found for: MCT  Lab Results   Component Value Date    MCV 94 03/13/2019     Lab Results   Component Value Date    MCH 30.6 03/13/2019     Lab Results   Component Value Date    MCHC 32.7 03/13/2019     Lab Results   Component Value Date    RDW 13.8 03/13/2019     Lab Results   Component Value Date     03/13/2019       Last Basic Metabolic Panel:  Lab Results   Component Value Date     03/13/2019      Lab Results   Component Value Date    POTASSIUM 4.4 03/13/2019     Lab Results   Component Value Date    CHLORIDE 111 03/13/2019     Lab Results   Component Value Date    KAREN 8.0 03/13/2019     Lab Results   Component Value Date    CO2 26 03/13/2019     Lab Results   Component Value Date    BUN 20 03/13/2019     Lab Results   Component Value Date    CR 1.13 03/13/2019     Lab Results   Component Value Date     03/13/2019       CXR: pending official read    Rebecca Lowe PA-C  Pager #: [phone]

## 2019-03-13 NOTE — PROGRESS NOTES
[facility]  Critical Care Service  Progress Note  Date of Service (when I saw the patient): 03/13/2019  Main Plans for Today - Transfer out of ICU  - Added toradol scheduled x1 dose and prn x3 doses  Assessment & Plan   Edilberto Brooks is a 48 year old male with PMH obesity and HTN who presented to the ICU after a CABG x4 (LIMA-LAD, SVG-DIAG, SVG-OM, SVG-LPDA) on 3/12/19.     Neuro  Dx: Acute pain  - Propofol infusion to achieve RASS goal 0 to -1  - Scheduled acetaminophen, gabapentin, and lidocaine patches  - Fentanyl, oxycodone, and robaxin available prn   - Avoiding morphine at family request     CV:  Dx: CAD  Dx: HTN  Dx: HLD  - Maintain SBP <130, not currently on any vasoactive medications  - Cardiac meds per CV surgery (currently on amlodipine, losartan, and metoprolol)  - Continue ASA and statin  - Epicardial ventricular pacing wire in, not currently paced     Resp:  Dx: Post-operative ventilator management, resolved. Extubated last night.  - Wean oxygen to maintain SpO2 >92%  - Pulmonary hygiene with IS, acapella     Dx: Concern for bronchospasm, no history of obstructive airway disease  - Continue duonebs prn     GI/Nutrition:  Dx: Nutrition  - ADAT     Renal:  Dx: No acute issues, baseline creatinine 1.0  - Monitor function and electrolytes as needed with replacement per ICU protocols.   - Avoid nephrotoxic agents such as NSAID, IV contrast unless specifically required  - Adjust medications as needed for renal clearance  - Caraballo for strict I/O in post-operative period     ID:  Dx: No acute issues  - Complete dione-operative antibiotics (ancef)  - Monitor fevers and leukocytosis consistent with stress response from procedure, no cultures for fevers in POD 1-2     Endocrine:  Dx: Stress induced hyperglycemia  - POC glucose every 2 hours for the first 4 hours and then 4 hours for 48 hours, goal to maintain less than 150  - Insulin drip to maintain blood glucose <150     Heme:  Dx: Acute blood loss  anemia  Dx: Leukocytosis, likely reactive in the setting of surgery, improving  - Monitor chest tube output  - Transfuse for hemoglobin <7     MSK:  Dx: Sternotomy  - Sternal precautions per protocol     Dx: Suspected right ankle gout  - Continuing short course of prednisone     General cares:  DVT Prophylaxis: Defer to primary service  GI Prophylaxis: PPI per primary service  Restraints: Restraints for medical healing needed: No  Family update by me today: No  Current lines are required for patient management  Access:  - Right internal jugular  - Caraballo  - Chest tube x2   Discussed with CV surgery NP.  Anat Burks  Time Spent on this Encounter   Billing: I spent 25 minutes bedside and on the inpatient unit today managing the critical care of Edilberto Brooks in relation to the issues listed in this note.  Interval History   Extubated overnight. Complains of pain in his chest, shoulders, and arms 4/10. No shortness of breath, but says he can only get IS to 1L instead of 3.5L he was able to do previously. Tingling in bilateral fingers unchanged from baseline.   Physical Exam   Temp: 100.6  F (38.1  C) Temp src: Bladder Temp  Min: 97  F (36.1  C)  Max: 100.6  F (38.1  C) BP: 136/80 Pulse: 83 Heart Rate: 84 Resp: 18 SpO2: 96 % O2 Device: Nasal cannula Oxygen Delivery: 2 LPM  Vitals:    03/11/19 0659 03/12/19 0527 03/13/19 0500   Weight: 147.2 kg (324 lb 9.6 oz) 145.9 kg (321 lb 9.6 oz) 149.2 kg (328 lb 14.8 oz)     I/O last 3 completed shifts:  In: 6509.87 [P.O.:480; I.V.:5594.87; Other:435]  Out: 2248 [Urine:1153; Blood:725; Chest Tube:370]    GEN: Awake alert  EYES: No icterus   HEENT: Normocephalic; mucous membranes moist  CV: RRR, no gallops, rubs, or murmurs; pedal pulses 1+; no pedal edema  PULM/CHEST: Clear breath sounds bilaterally without rhonchi, crackles or wheeze; symmetric chest rise  GI: Abdomen obese; bowel sounds present; soft and non-tender to palpation  : Caraballo catheter in place, urine yellow and  clear  NEURO: No focal deficits, moves all extremities equally; sensation equal and intact bilaterally   SKIN: Midline sternal incision well approximated, LLE incision site also well approximated with surrounding erythema    Imaging personally reviewed: CXR - no focal opacities, effusion, pneumothorax    Data   Recent Labs   Lab 03/13/19  0420 03/12/19  1750 03/12/19  1439 03/12/19  1436  03/07/19  0539 03/06/19  2138 03/06/19 2017   WBC 12.7* 16.5*  --  28.8*   < >  --   --  12.8*   HGB 11.8* 12.4* 11.2* 11.1*   < >  --   --  15.7   MCV 94 93  --  93   < >  --   --  90    224  --  239   < > 315  --  361   INR  --  1.24*  --  1.63*  --   --   --   --     144 142 143   < > 137  --  135   POTASSIUM 4.4 5.0 4.0 4.1   < > 3.3*  --  2.6*   CHLORIDE 111* 113*  --  114*   < > 99  --  97   CO2 26 23  --  23   < > 31  --  28   BUN 20 20  --  18   < > 17  --  19   CR 1.13 0.98  --  1.01   < > 1.17  --  1.17   ANIONGAP 5 8  --  6   < > 7  --  10   KAREN 8.0* 8.4*  --  9.7   < > 8.7  --  8.6   * 136*  --  147*   < > 97  --  130*   ALBUMIN 2.7* 2.8*  --  2.7*   < >  --   --   --    PROTTOTAL 5.7* 5.8*  --  5.5*   < >  --   --   --    BILITOTAL 0.4 0.6  --  0.5   < >  --   --   --    ALKPHOS 68 74  --  76   < >  --   --   --    ALT 46 54  --  50   < >  --   --   --    AST 39 57*  --  43   < >  --   --   --    TROPI  --   --   --   --   --  1.771* 0.255* 0.036    < > = values in this interval not displayed.

## 2019-03-13 NOTE — PLAN OF CARE
Pt arrived from OR 1550 via bed accompanied by anesthesia, OR team, remains off pressors, weaning sedation towards extubating patient, propofol off, see MAR, began vent wean cpap mode 5/5 at 1805, at first well tolerated, pt cooperative, calm, respirations controlled, respiratory rate 16, good tidal volumes, then pt became more agitated, uncomfortable, calming methods tried, pain medication on board, see MAR, propofol restarted and pt back on CMV mode at 1815.  Dex gtt started per MAR, will wean propofol off while turning dex up and try vent weaning again when able.

## 2019-03-13 NOTE — PLAN OF CARE
Discharge Planner PT   Patient plan for discharge: TCU, interested in [facility]. Patient lives alone and has no assist available for discharge.  Current status: Patient admitted on 3/6/19 for evaluation of anterior chest pain; patient noted to have elevated troponin and admitted for NSTEMI. Cardiac cath revealed multivessel disease; patient is now POD-1 from CABG x4 to  LIMA-LAD, SVG-DIAG, SVG-OM, SVG-LPDA. Patient with PMH of HTN. Patient lives alone in a condominium with elevator access to 4th floor apartment, with stair access if he chooses. Patient notes he is independent at baseline.     Patient sitting in chair upon arrival of PT, agreeable to working with PT. Educated on role of PT/CR and POC. Discussed sternal precautions, patient able to repeat back to therapist. BP pre-activity 114/63 HR 78. Sit<>stand from chair with CGA and cues for anterior weight shift to initiate transfer. Patient bracing legs on back of chair upon standing and Min A needed to support. Patient then able to take a few steps from chair to bed with CGA and A for managing lines/tubes. Sit>supine with mod A x 2 and cues for sequencing. BP post activity 113/68, HR 81. Stable cardiac response noted. Patient in supine at end of session with all needs in reach and bed alarm on.   Barriers to return to prior living situation: Current level of A, decreased tolerance to activity, lives alone   Recommendations for discharge: TCU as patient lives alone and does not have assist at discharge  Rationale for recommendations: Patient POD-1 from CABGx4, currently requiring A x 1-2 for mobility this date. Patient lives alone and does not have assist at discharge, therefore patient would benefit from continued skilled therapy to further address functional limitations and improve strength, tolerance to activity, and improve independence with mobility and ambulation.        Entered by: Zuly Sunshine 03/13/2019 2:55 PM

## 2019-03-13 NOTE — PROVIDER NOTIFICATION
Pt switched to pressure support 5/5 at 1937 while on precedex.Throughout weaning pt has remained calm and cooperative with adequate TV and RR. 40 min into wean pt became increasingly anxious and asking for water around ETT. Dr. Renteria of intensivist tele hub notified and came to bedside to evaluate. Pt able to calmly follow commands and gave the okay to extubate, no need to wait for pre extubation ABG's. Pt extubated at 2023 to aerosol mask.     Priscila Dumont RN

## 2019-03-14 ENCOUNTER — APPOINTMENT (OUTPATIENT)
Dept: PHYSICAL THERAPY | Facility: CLINIC | Age: 49
DRG: 234 | End: 2019-03-14
Payer: COMMERCIAL

## 2019-03-14 LAB
ANION GAP SERPL CALCULATED.3IONS-SCNC: 8 MMOL/L (ref 3–14)
BUN SERPL-MCNC: 26 MG/DL (ref 7–30)
CALCIUM SERPL-MCNC: 8.5 MG/DL (ref 8.5–10.1)
CHLORIDE SERPL-SCNC: 109 MMOL/L (ref 94–109)
CO2 SERPL-SCNC: 22 MMOL/L (ref 20–32)
CREAT SERPL-MCNC: 1.13 MG/DL (ref 0.66–1.25)
ERYTHROCYTE [DISTWIDTH] IN BLOOD BY AUTOMATED COUNT: 13.8 % (ref 10–15)
GFR SERPL CREATININE-BSD FRML MDRD: 76 ML/MIN/{1.73_M2}
GLUCOSE BLDC GLUCOMTR-MCNC: 115 MG/DL (ref 70–99)
GLUCOSE BLDC GLUCOMTR-MCNC: 82 MG/DL (ref 70–99)
GLUCOSE BLDC GLUCOMTR-MCNC: 89 MG/DL (ref 70–99)
GLUCOSE BLDC GLUCOMTR-MCNC: 91 MG/DL (ref 70–99)
GLUCOSE SERPL-MCNC: 107 MG/DL (ref 70–99)
HCT VFR BLD AUTO: 34.7 % (ref 40–53)
HGB BLD-MCNC: 11.5 G/DL (ref 13.3–17.7)
MAGNESIUM SERPL-MCNC: 2.4 MG/DL (ref 1.6–2.3)
MCH RBC QN AUTO: 30.5 PG (ref 26.5–33)
MCHC RBC AUTO-ENTMCNC: 33.1 G/DL (ref 31.5–36.5)
MCV RBC AUTO: 92 FL (ref 78–100)
PHOSPHATE SERPL-MCNC: 2.6 MG/DL (ref 2.5–4.5)
PLATELET # BLD AUTO: 243 10E9/L (ref 150–450)
POTASSIUM SERPL-SCNC: 3.8 MMOL/L (ref 3.4–5.3)
RBC # BLD AUTO: 3.77 10E12/L (ref 4.4–5.9)
SODIUM SERPL-SCNC: 139 MMOL/L (ref 133–144)
WBC # BLD AUTO: 11.9 10E9/L (ref 4–11)

## 2019-03-14 PROCEDURE — 25000125 ZZHC RX 250: Performed by: INTERNAL MEDICINE

## 2019-03-14 PROCEDURE — 20000003 ZZH R&B ICU

## 2019-03-14 PROCEDURE — 25000132 ZZH RX MED GY IP 250 OP 250 PS 637: Performed by: HOSPITALIST

## 2019-03-14 PROCEDURE — 25000128 H RX IP 250 OP 636: Performed by: NURSE PRACTITIONER

## 2019-03-14 PROCEDURE — 99222 1ST HOSP IP/OBS MODERATE 55: CPT | Performed by: INTERNAL MEDICINE

## 2019-03-14 PROCEDURE — 84100 ASSAY OF PHOSPHORUS: CPT | Performed by: STUDENT IN AN ORGANIZED HEALTH CARE EDUCATION/TRAINING PROGRAM

## 2019-03-14 PROCEDURE — 25000132 ZZH RX MED GY IP 250 OP 250 PS 637: Performed by: STUDENT IN AN ORGANIZED HEALTH CARE EDUCATION/TRAINING PROGRAM

## 2019-03-14 PROCEDURE — 99232 SBSQ HOSP IP/OBS MODERATE 35: CPT | Performed by: INTERNAL MEDICINE

## 2019-03-14 PROCEDURE — 85027 COMPLETE CBC AUTOMATED: CPT | Performed by: STUDENT IN AN ORGANIZED HEALTH CARE EDUCATION/TRAINING PROGRAM

## 2019-03-14 PROCEDURE — C9113 INJ PANTOPRAZOLE SODIUM, VIA: HCPCS | Performed by: STUDENT IN AN ORGANIZED HEALTH CARE EDUCATION/TRAINING PROGRAM

## 2019-03-14 PROCEDURE — 36415 COLL VENOUS BLD VENIPUNCTURE: CPT | Performed by: STUDENT IN AN ORGANIZED HEALTH CARE EDUCATION/TRAINING PROGRAM

## 2019-03-14 PROCEDURE — 97110 THERAPEUTIC EXERCISES: CPT | Mod: GP | Performed by: PHYSICAL THERAPIST

## 2019-03-14 PROCEDURE — 83735 ASSAY OF MAGNESIUM: CPT | Performed by: STUDENT IN AN ORGANIZED HEALTH CARE EDUCATION/TRAINING PROGRAM

## 2019-03-14 PROCEDURE — 80048 BASIC METABOLIC PNL TOTAL CA: CPT | Performed by: STUDENT IN AN ORGANIZED HEALTH CARE EDUCATION/TRAINING PROGRAM

## 2019-03-14 PROCEDURE — 00000146 ZZHCL STATISTIC GLUCOSE BY METER IP

## 2019-03-14 PROCEDURE — 25000128 H RX IP 250 OP 636: Performed by: STUDENT IN AN ORGANIZED HEALTH CARE EDUCATION/TRAINING PROGRAM

## 2019-03-14 PROCEDURE — 97530 THERAPEUTIC ACTIVITIES: CPT | Mod: GP | Performed by: PHYSICAL THERAPIST

## 2019-03-14 PROCEDURE — 25000132 ZZH RX MED GY IP 250 OP 250 PS 637: Performed by: PHYSICIAN ASSISTANT

## 2019-03-14 RX ORDER — TRAMADOL HYDROCHLORIDE 50 MG/1
50 TABLET ORAL EVERY 6 HOURS PRN
Status: DISCONTINUED | OUTPATIENT
Start: 2019-03-14 | End: 2019-03-18 | Stop reason: HOSPADM

## 2019-03-14 RX ADMIN — GABAPENTIN 300 MG: 300 CAPSULE ORAL at 20:31

## 2019-03-14 RX ADMIN — LEVOTHYROXINE SODIUM 50 MCG: 50 TABLET ORAL at 10:55

## 2019-03-14 RX ADMIN — PREDNISONE 20 MG: 10 TABLET ORAL at 08:23

## 2019-03-14 RX ADMIN — KETOROLAC TROMETHAMINE 15 MG: 15 INJECTION, SOLUTION INTRAMUSCULAR; INTRAVENOUS at 08:14

## 2019-03-14 RX ADMIN — METHOCARBAMOL TABLETS 750 MG: 750 TABLET, COATED ORAL at 11:51

## 2019-03-14 RX ADMIN — LOSARTAN POTASSIUM 25 MG: 25 TABLET ORAL at 10:54

## 2019-03-14 RX ADMIN — LIDOCAINE 1 PATCH: 560 PATCH PERCUTANEOUS; TOPICAL; TRANSDERMAL at 07:58

## 2019-03-14 RX ADMIN — ATORVASTATIN CALCIUM 40 MG: 40 TABLET, FILM COATED ORAL at 22:10

## 2019-03-14 RX ADMIN — TRAMADOL HYDROCHLORIDE 50 MG: 50 TABLET, COATED ORAL at 18:38

## 2019-03-14 RX ADMIN — AMLODIPINE BESYLATE 5 MG: 5 TABLET ORAL at 08:24

## 2019-03-14 RX ADMIN — PANTOPRAZOLE SODIUM 40 MG: 40 INJECTION, POWDER, FOR SOLUTION INTRAVENOUS at 08:15

## 2019-03-14 RX ADMIN — HEPARIN SODIUM 5000 UNITS: 5000 INJECTION, SOLUTION INTRAVENOUS; SUBCUTANEOUS at 08:38

## 2019-03-14 RX ADMIN — ACETAMINOPHEN 975 MG: 325 TABLET, FILM COATED ORAL at 07:57

## 2019-03-14 RX ADMIN — TRAMADOL HYDROCHLORIDE 50 MG: 50 TABLET, COATED ORAL at 10:54

## 2019-03-14 RX ADMIN — ACETAMINOPHEN 975 MG: 325 TABLET, FILM COATED ORAL at 16:00

## 2019-03-14 RX ADMIN — HEPARIN SODIUM 5000 UNITS: 5000 INJECTION, SOLUTION INTRAVENOUS; SUBCUTANEOUS at 16:04

## 2019-03-14 RX ADMIN — ASPIRIN 325 MG: 325 TABLET, DELAYED RELEASE ORAL at 10:55

## 2019-03-14 RX ADMIN — POTASSIUM CHLORIDE 20 MEQ: 1500 TABLET, EXTENDED RELEASE ORAL at 16:00

## 2019-03-14 RX ADMIN — GABAPENTIN 300 MG: 300 CAPSULE ORAL at 10:55

## 2019-03-14 RX ADMIN — MUPIROCIN 0.5 G: 20 OINTMENT TOPICAL at 20:51

## 2019-03-14 RX ADMIN — METHOCARBAMOL TABLETS 750 MG: 750 TABLET, COATED ORAL at 20:31

## 2019-03-14 RX ADMIN — KETOROLAC TROMETHAMINE 15 MG: 15 INJECTION, SOLUTION INTRAMUSCULAR; INTRAVENOUS at 00:44

## 2019-03-14 RX ADMIN — MUPIROCIN 0.5 G: 20 OINTMENT TOPICAL at 08:43

## 2019-03-14 ASSESSMENT — ACTIVITIES OF DAILY LIVING (ADL)
ADLS_ACUITY_SCORE: 14

## 2019-03-14 NOTE — PROGRESS NOTES
Spoke with Roxi Dennis NP re: patient having high frequency of triggering temporary pacer. Patient may have had some dizziness associated. Per NP, turn pacer back up rate to 35 and assess how low pt HR goes and monitor for symptoms.

## 2019-03-14 NOTE — PROGRESS NOTES
End of Shift Nursing Summary  03/13/19 0700 to 1930   Neuro:  Intact.   Pain: Chest/shoulder/neck pain and with deep inspiration. toradol most helpful in providing relief.   CV:  SR on tele with occasional PVC. Severe bradycardia this evening. Temporary pacer hooked up with backup rate of 50 per MD.   Pulm: Lungs clear. Using IS independently. Best to 2000 ml.  GI/: Urine output low-lasix ordered x1. No BM. Not passing gas . No appetite. Fluid intake adequate.  Endocrine: BG WNL  Skin: Incision on chest CDI. Leg incisions CDI, ecchymotic.   Lines/drips: 2 PIV-saline locked.   Additional: Up in chair with assist 2. Worked with cardiac rehab this afternoon. Able to make needs known.      *See EPIC flowsheet for full nursing assessment findings    Priscila Brewer

## 2019-03-14 NOTE — PROGRESS NOTES
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 CABG 3/12/2019. Normal EF.  Post op intermittent sinus asystole while in bed. Pt was about to fall to sleep but not sure if he was about to pass out. He received 50 mg of metoprolol yesterday am, stopped afterwards. No previous syncope in the last 5 years. Stable sinus rhythm now.  Agree to stop metoprolol.  Continue monitoring.  Consider pacemaker implantation only if he has recurrent asystole with apparent symptoms.

## 2019-03-14 NOTE — PROGRESS NOTES
End of Shift Nursing Summary  03/14/19 0700 to 1930   Neuro:  Intact. MART. PERRL.  Pain: Rating 2-4/ PRN tramadol, toradol and robaxin given with some relief. Worse on inspiration.  CV:  SR with episodes of bradycardia. Temp. Pacer on with back-up rate of 50 with good capture. Pt states with these episodes he feels himself start to fall asleep and feels SOB and then has to tell himself to take a deep breath. Not witnessed to be apneic-O2 sats maintained. Pt denies awaking with a gasp or snore.   Pulm: Lungs with fine crackles in left lung fields. Using IS independently.   GI/: Urine output adequate. Appetite improved from yesterday, ate 50% of breakfast. Passing gas, no BM yet.  Endocrine: BG checks WNL, no insulin coverage.  Skin: Incision ecchymotic, CDI. Psoriasis plaques  Fever: Tmax 99.0 oral  Lines/drips: 1 PIV, lozada, chest tubes and epicardial wires.  Additional: Up in chair for 3 hrs. Up with assist-2, stable on feet.      *See EPIC flowsheet for full nursing assessment findings    Priscila Brewer

## 2019-03-14 NOTE — PROGRESS NOTES
[facility]  Cardiovascular and Thoracic Surgery Daily Note          Assessment and Plan:   POD#2 s/p Coronary artery bypass grafting x4, LIMA-LAD, SVG-DIAG, SVG-OM, SVG-LPDA; endoscopic harvest left greater saphenous vein by Dr. Freddy Rincon  -CVS: HR: 70s, having pauses, had pauses before surgery and pt states he had feeling similar before coming into hospital, EP consult in-appreciate their nice recs, pauses recorded on 3/10/19 in chart, SBP: 140s/70s, ASA, BB, statin, sub q heparin  -Resp: Extubated within 24 hours of surgery, sating well on 2L, encourage IS  -Neuro: grossly intact, pain controlled with current regimen  -Renal: good UO, Crea: pending<1.13, down 2 kg below pre-op weight, albumin available  -GI:  Continue bowel regimen, miralax added  -:  Caraballo in place, limited mobility, accurate Is & Os  -Endo: transition to SSI, pre-op A1C 6.3  -FEN: replete lytes as needed, Na: pending, K: 4.5, Orders Placed This Encounter, Orders Placed This Encounter      Advance Diet as Tolerated: Clear Liquid Diet; Regular Diet Adult    -ID: WBC: pending 12.7, Temp (24hrs), Av.2  F (37.3  C), Min:99  F (37.2  C), Max:99.7  F (37.6  C), completed dione-op abx  -Heme: Hgb & plt: pending   -Proph: PCD, ASA, BB, statin, sub q heparin  -Dispo: ICU, continue therapies, encourage IS, appreciate EP recs          Interval History:   Lying in bed, pain controlled with current regimen, having pauses-not new, patient informed me he has been having similar feeling before surgery         Medications:       acetaminophen  975 mg Oral Q8H     amLODIPine  5 mg Oral Daily     aspirin  325 mg Oral Daily     atorvastatin  40 mg Oral At Bedtime     gabapentin  300 mg Oral BID     heparin  5,000 Units Subcutaneous Q8H     insulin aspart  1-7 Units Subcutaneous TID AC     insulin aspart  1-5 Units Subcutaneous At Bedtime     levothyroxine  50 mcg Oral Daily     lidocaine  1 patch Transdermal Q24H     lidocaine   Transdermal  "Q8H     lidocaine   Transdermal Q24h     losartan  25 mg Oral Daily     mupirocin  0.5 g Both Nostrils BID     pantoprazole (PROTONIX) IV  40 mg Intravenous Daily     polyethylene glycol  17 g Oral BID     predniSONE  20 mg Oral Daily     senna-docusate  1 tablet Oral BID    Or     senna-docusate  2 tablet Oral BID     sodium chloride (PF)  3 mL Intracatheter Q8H     sodium chloride (PF)  3 mL Intracatheter Q8H     @MEDSPRN-@          Physical Exam:   Vitals were reviewed  Blood pressure 141/70, pulse 83, temperature 99.1  F (37.3  C), temperature source Bladder, resp. rate 16, height 1.905 m (6' 3\"), weight 149.2 kg (328 lb 14.8 oz), SpO2 96 %.  Rhythm: NSR, paced intermittently    Lungs: course    Cardiovascular: s1/s2, no m/r/g    Abdomen: s/nt/nd    Extremeties: no LE edema    Incision: cdi    CT: to suction    Weight:   Vitals:    03/08/19 0638 03/09/19 0556 03/11/19 0659 03/12/19 0527   Weight: 147.9 kg (326 lb 1.6 oz) 148.1 kg (326 lb 8 oz) 147.2 kg (324 lb 9.6 oz) 145.9 kg (321 lb 9.6 oz)    03/13/19 0500   Weight: 149.2 kg (328 lb 14.8 oz)            Data:   Labs:   Lab Results   Component Value Date    WBC 12.7 03/13/2019     Lab Results   Component Value Date    RBC 3.86 03/13/2019     Lab Results   Component Value Date    HGB 11.8 03/13/2019     Lab Results   Component Value Date    HCT 36.1 03/13/2019     No components found for: MCT  Lab Results   Component Value Date    MCV 94 03/13/2019     Lab Results   Component Value Date    MCH 30.6 03/13/2019     Lab Results   Component Value Date    MCHC 32.7 03/13/2019     Lab Results   Component Value Date    RDW 13.8 03/13/2019     Lab Results   Component Value Date     03/13/2019       Last Basic Metabolic Panel:  Lab Results   Component Value Date     03/13/2019      Lab Results   Component Value Date    POTASSIUM 4.5 03/13/2019     Lab Results   Component Value Date    CHLORIDE 111 03/13/2019     Lab Results   Component Value Date    KAREN 8.0 " 03/13/2019     Lab Results   Component Value Date    CO2 26 03/13/2019     Lab Results   Component Value Date    BUN 20 03/13/2019     Lab Results   Component Value Date    CR 1.13 03/13/2019     Lab Results   Component Value Date     03/13/2019       Rebecca Lowe PA-C  Pager #: [phone]

## 2019-03-14 NOTE — PROGRESS NOTES
5 episodes of brief drop in HR to <50, V-wires remain connected to generator, back-up rate of 50, mA10. 3-5 paced beats then spontaneous return to NSR rate in the 70's. Continue to monitor, leave pacer on standby

## 2019-03-14 NOTE — PLAN OF CARE
PT-Attempted to see again in p.m. Patient pleasantly requested to stay in bed this p.m. Due to fatigue and not having had much sleep last night.

## 2019-03-14 NOTE — PLAN OF CARE
Discharge Planner PT   Patient plan for discharge: TCU  Current status: Patient seen in a.m. Patient able to transfer sup to sit with mod assist of 2. Cues given for sternal precautions. Once sitting, takes extra time and mod assist to scoot to edge of bed. Sit to stand with CGA and tolerated amb about 20 feet in room with CGA and assist for equipment. Left up in chair with nurse aware. VSS throughout treatment session.   Barriers to return to prior living situation: Lives alone, current level of assist, no one to stay with him after discharge  Recommendations for discharge: TCU followed by phase II CR once discharged home.   Rationale for recommendations: Patient would benefit from continued skilled PT at rehab to progress his independence and activity tolerance prior to discharging home and starting phase II CR       Entered by: Pamela Thornton 03/14/2019 3:40 PM

## 2019-03-14 NOTE — PROVIDER NOTIFICATION
CV surgery paged to notify pt's HR alarmed after turning temp pacer down to 35bpm.  RN increased rate back to 50bpm.  CRAIG Diane ordered CPAP for pt tonight.  RN also text paged Dr. Jean Baptiste to update.

## 2019-03-14 NOTE — PROGRESS NOTES
"Pt. talking about hoping that \"I can take the 30 of Tordal because that really works for my pain\". Spoke with Dr. Carballo, no increase in Tordal @ this time. Offered Oxycodone, pt. refuses @ this time because\" it makes my stomach feel sick\" . Was given Robaxin earlier \"it helped some\". We discussed importance of keeping up with pain medications to facilitate healing, mobility. He is willing to use \"non-narcotics for now\"  "

## 2019-03-14 NOTE — PROGRESS NOTES
[facility]    Hospitalist Progress Note      Assessment & Plan   Edilberto Brooks is a 48 year old male with hx of HTN, who presented to the emergency room with anterior chest pressure. He is noted to have elevated troponin and admitted for NSTEMI.  Cardiac cath revealed multivessel disease and s/p CABG x 4 on 3/12     Non-ST-elevation myocardial infarction   CAD s/p CABGx4 (LIMA-LAD, SVG-DIAG, SVG-OM, SVG-LPDA) on 3/12  Patient presented with chest pain. Trop noted to be elevated(0.036--->0.255---1.771). TTE showed EF of 55-60%, no WMA. Coronary angiogram on 3/7 reveals multivessel disease. He is now s/p CABG.    - routine post op care per CV surgery  - Continue aspirin, Metoprolol, Losartan, lipitor  - chest tubes in place  - per notes review, sister has concern about morphine allergy, patient does not recall adverse reactions from morphine      Hypertension/ hyperlipidemia  - meds as above. PTA Dyazide was stopped on admission due to hypokalemia.      Suspected Right ankle gout. improved  Patient with ankle pain and swelling. No erythema around the joint. Stared on colchicine on 3/10. Pain feels slightly better. Ortho evaluated and felt likely gout. Given drug interaction in statin and colchicine, colchicine stopped on 3/11 and prednisone started with improvement in symptom.   - continue Prednisone 20mg x 1 more day, then stop.     Prediabetes  A1c 6.3%. Not on meds.  - hyperglycemia postop likely due to dextrose infusion and some steroids. This has now improved.   - off the drip and not requiring sliding scale insulin currently. Minimal PO intake at this time  - monitor BG  - diet, wt loss and life style modifications advised    Per social work note on 3/12, patient's sister concerned about depression and requested psych consult. Per discussion with patient on 3/13, he does not feel depressed. He denies suicidal thoughts.  Deferring pscy consult at this time.    Communications: discussed with  RN       DVT Prophylaxis: Heparin sq  Code Status: Full Code    Disposition: Expected discharge pending post-op course per CV surgery    Violeta Dennis MD  Text Page  (7am to 6pm)    Interval History   Chest wall pain. Does not want to take oxycodone, upsets his stomach. Toradol works well.     Minimal PO intake in last 24hrs, states no appetite. Denies n/v.     Overnight events also noted with HR slowing down and some pauses. Currently not using temp pacer.     -Data reviewed today: I reviewed all new labs and imaging results over the last 24 hours. I personally reviewed no images or EKG's today.    Physical Exam   Temp: 99.1  F (37.3  C) Temp src: Bladder BP: 141/70   Heart Rate: 77 Resp: 16 SpO2: 96 % O2 Device: Nasal cannula Oxygen Delivery: 2 LPM  Vitals:    03/11/19 0659 03/12/19 0527 03/13/19 0500   Weight: 147.2 kg (324 lb 9.6 oz) 145.9 kg (321 lb 9.6 oz) 149.2 kg (328 lb 14.8 oz)     Vital Signs with Ranges  Temp:  [99  F (37.2  C)-99.7  F (37.6  C)] 99.1  F (37.3  C)  Heart Rate:  [65-84] 77  Resp:  [10-25] 16  BP: (102-149)/(60-77) 141/70  SpO2:  [90 %-98 %] 96 %  I/O last 3 completed shifts:  In: 2320 [P.O.:1850; I.V.:470]  Out: 1500 [Urine:1140; Chest Tube:360]    Constitutional: Alert, awake and no apparent distress  Respiratory: Clear to auscultation bialterally anterior  Cardiovascular: regular rate and rhythm  GI: soft and non tender  Other:right ankle with with good ROM without pain,no effusion    Medications     IV fluid REPLACEMENT ONLY       dextrose 5% and 0.45% NaCl + KCl 20 mEq/L Stopped (03/13/19 1700)     dextrose 5% and 0.45% NaCl       sodium chloride Stopped (03/13/19 1700)       acetaminophen  975 mg Oral Q8H     amLODIPine  5 mg Oral Daily     aspirin  325 mg Oral Daily     atorvastatin  40 mg Oral At Bedtime     gabapentin  300 mg Oral BID     heparin  5,000 Units Subcutaneous Q8H     insulin aspart  1-7 Units Subcutaneous TID AC     insulin aspart  1-5 Units Subcutaneous At  Bedtime     levothyroxine  50 mcg Oral Daily     lidocaine  1 patch Transdermal Q24H     lidocaine   Transdermal Q8H     lidocaine   Transdermal Q24h     losartan  25 mg Oral Daily     mupirocin  0.5 g Both Nostrils BID     pantoprazole (PROTONIX) IV  40 mg Intravenous Daily     polyethylene glycol  17 g Oral BID     predniSONE  20 mg Oral Daily     senna-docusate  1 tablet Oral BID    Or     senna-docusate  2 tablet Oral BID     sodium chloride (PF)  3 mL Intracatheter Q8H     sodium chloride (PF)  3 mL Intracatheter Q8H       Data   Recent Labs   Lab 03/13/19  1916 03/13/19  0420 03/12/19  1750 03/12/19  1439 03/12/19  1436   WBC  --  12.7* 16.5*  --  28.8*   HGB  --  11.8* 12.4* 11.2* 11.1*   MCV  --  94 93  --  93   PLT  --  242 224  --  239   INR  --   --  1.24*  --  1.63*   NA  --  142 144 142 143   POTASSIUM 4.5 4.4 5.0 4.0 4.1   CHLORIDE  --  111* 113*  --  114*   CO2  --  26 23  --  23   BUN  --  20 20  --  18   CR  --  1.13 0.98  --  1.01   ANIONGAP  --  5 8  --  6   KAREN  --  8.0* 8.4*  --  9.7   GLC  --  119* 136*  --  147*   ALBUMIN  --  2.7* 2.8*  --  2.7*   PROTTOTAL  --  5.7* 5.8*  --  5.5*   BILITOTAL  --  0.4 0.6  --  0.5   ALKPHOS  --  68 74  --  76   ALT  --  46 54  --  50   AST  --  39 57*  --  43       No results found for this or any previous visit (from the past 24 hour(s)).

## 2019-03-15 ENCOUNTER — APPOINTMENT (OUTPATIENT)
Dept: PHYSICAL THERAPY | Facility: CLINIC | Age: 49
DRG: 234 | End: 2019-03-15
Payer: COMMERCIAL

## 2019-03-15 LAB
ANION GAP SERPL CALCULATED.3IONS-SCNC: 5 MMOL/L (ref 3–14)
BUN SERPL-MCNC: 25 MG/DL (ref 7–30)
CALCIUM SERPL-MCNC: 7.9 MG/DL (ref 8.5–10.1)
CHLORIDE SERPL-SCNC: 110 MMOL/L (ref 94–109)
CO2 SERPL-SCNC: 26 MMOL/L (ref 20–32)
CREAT SERPL-MCNC: 1 MG/DL (ref 0.66–1.25)
ERYTHROCYTE [DISTWIDTH] IN BLOOD BY AUTOMATED COUNT: 13.7 % (ref 10–15)
GFR SERPL CREATININE-BSD FRML MDRD: 89 ML/MIN/{1.73_M2}
GLUCOSE BLDC GLUCOMTR-MCNC: 109 MG/DL (ref 70–99)
GLUCOSE BLDC GLUCOMTR-MCNC: 113 MG/DL (ref 70–99)
GLUCOSE BLDC GLUCOMTR-MCNC: 85 MG/DL (ref 70–99)
GLUCOSE BLDC GLUCOMTR-MCNC: 97 MG/DL (ref 70–99)
GLUCOSE SERPL-MCNC: 98 MG/DL (ref 70–99)
HCT VFR BLD AUTO: 31.2 % (ref 40–53)
HGB BLD-MCNC: 10.4 G/DL (ref 13.3–17.7)
MCH RBC QN AUTO: 30.7 PG (ref 26.5–33)
MCHC RBC AUTO-ENTMCNC: 33.3 G/DL (ref 31.5–36.5)
MCV RBC AUTO: 92 FL (ref 78–100)
PHOSPHATE SERPL-MCNC: 2.7 MG/DL (ref 2.5–4.5)
PLATELET # BLD AUTO: 228 10E9/L (ref 150–450)
POTASSIUM SERPL-SCNC: 3.7 MMOL/L (ref 3.4–5.3)
POTASSIUM SERPL-SCNC: 4.3 MMOL/L (ref 3.4–5.3)
RBC # BLD AUTO: 3.39 10E12/L (ref 4.4–5.9)
SODIUM SERPL-SCNC: 141 MMOL/L (ref 133–144)
WBC # BLD AUTO: 10.2 10E9/L (ref 4–11)

## 2019-03-15 PROCEDURE — 99232 SBSQ HOSP IP/OBS MODERATE 35: CPT | Performed by: INTERNAL MEDICINE

## 2019-03-15 PROCEDURE — 25000132 ZZH RX MED GY IP 250 OP 250 PS 637: Performed by: HOSPITALIST

## 2019-03-15 PROCEDURE — 84100 ASSAY OF PHOSPHORUS: CPT | Performed by: PHYSICIAN ASSISTANT

## 2019-03-15 PROCEDURE — 85027 COMPLETE CBC AUTOMATED: CPT | Performed by: PHYSICIAN ASSISTANT

## 2019-03-15 PROCEDURE — 94660 CPAP INITIATION&MGMT: CPT

## 2019-03-15 PROCEDURE — 25000125 ZZHC RX 250: Performed by: STUDENT IN AN ORGANIZED HEALTH CARE EDUCATION/TRAINING PROGRAM

## 2019-03-15 PROCEDURE — 40000275 ZZH STATISTIC RCP TIME EA 10 MIN

## 2019-03-15 PROCEDURE — 25000125 ZZHC RX 250: Performed by: INTERNAL MEDICINE

## 2019-03-15 PROCEDURE — 25000128 H RX IP 250 OP 636: Performed by: STUDENT IN AN ORGANIZED HEALTH CARE EDUCATION/TRAINING PROGRAM

## 2019-03-15 PROCEDURE — 25000128 H RX IP 250 OP 636: Performed by: NURSE PRACTITIONER

## 2019-03-15 PROCEDURE — 00000146 ZZHCL STATISTIC GLUCOSE BY METER IP

## 2019-03-15 PROCEDURE — 99207 ZZC CDG-MDM COMPONENT: MEETS MODERATE - UP CODED: CPT | Performed by: INTERNAL MEDICINE

## 2019-03-15 PROCEDURE — 97110 THERAPEUTIC EXERCISES: CPT | Mod: GP | Performed by: PHYSICAL THERAPIST

## 2019-03-15 PROCEDURE — 36415 COLL VENOUS BLD VENIPUNCTURE: CPT | Performed by: PHYSICIAN ASSISTANT

## 2019-03-15 PROCEDURE — 36415 COLL VENOUS BLD VENIPUNCTURE: CPT | Performed by: STUDENT IN AN ORGANIZED HEALTH CARE EDUCATION/TRAINING PROGRAM

## 2019-03-15 PROCEDURE — 84132 ASSAY OF SERUM POTASSIUM: CPT | Performed by: STUDENT IN AN ORGANIZED HEALTH CARE EDUCATION/TRAINING PROGRAM

## 2019-03-15 PROCEDURE — 25800030 ZZH RX IP 258 OP 636: Performed by: STUDENT IN AN ORGANIZED HEALTH CARE EDUCATION/TRAINING PROGRAM

## 2019-03-15 PROCEDURE — 80048 BASIC METABOLIC PNL TOTAL CA: CPT | Performed by: PHYSICIAN ASSISTANT

## 2019-03-15 PROCEDURE — 97530 THERAPEUTIC ACTIVITIES: CPT | Mod: GP | Performed by: PHYSICAL THERAPIST

## 2019-03-15 PROCEDURE — 25000132 ZZH RX MED GY IP 250 OP 250 PS 637: Performed by: PHYSICIAN ASSISTANT

## 2019-03-15 PROCEDURE — 25000132 ZZH RX MED GY IP 250 OP 250 PS 637: Performed by: STUDENT IN AN ORGANIZED HEALTH CARE EDUCATION/TRAINING PROGRAM

## 2019-03-15 PROCEDURE — 12000000 ZZH R&B MED SURG/OB

## 2019-03-15 RX ORDER — ACETAMINOPHEN 325 MG/1
650 TABLET ORAL EVERY 4 HOURS PRN
Status: DISCONTINUED | OUTPATIENT
Start: 2019-03-15 | End: 2019-03-16

## 2019-03-15 RX ORDER — PANTOPRAZOLE SODIUM 40 MG/1
40 TABLET, DELAYED RELEASE ORAL
Status: DISCONTINUED | OUTPATIENT
Start: 2019-03-15 | End: 2019-03-18 | Stop reason: HOSPADM

## 2019-03-15 RX ORDER — FUROSEMIDE 10 MG/ML
20 INJECTION INTRAMUSCULAR; INTRAVENOUS EVERY 12 HOURS
Status: DISCONTINUED | OUTPATIENT
Start: 2019-03-16 | End: 2019-03-18 | Stop reason: HOSPADM

## 2019-03-15 RX ORDER — ACETAMINOPHEN 650 MG/1
650 SUPPOSITORY RECTAL EVERY 4 HOURS PRN
Status: DISCONTINUED | OUTPATIENT
Start: 2019-03-15 | End: 2019-03-16

## 2019-03-15 RX ORDER — FUROSEMIDE 10 MG/ML
20 INJECTION INTRAMUSCULAR; INTRAVENOUS ONCE
Status: COMPLETED | OUTPATIENT
Start: 2019-03-15 | End: 2019-03-15

## 2019-03-15 RX ADMIN — HEPARIN SODIUM 5000 UNITS: 5000 INJECTION, SOLUTION INTRAVENOUS; SUBCUTANEOUS at 16:13

## 2019-03-15 RX ADMIN — PREDNISONE 20 MG: 10 TABLET ORAL at 08:36

## 2019-03-15 RX ADMIN — ACETAMINOPHEN 650 MG: 325 TABLET, FILM COATED ORAL at 04:08

## 2019-03-15 RX ADMIN — TRAMADOL HYDROCHLORIDE 50 MG: 50 TABLET, COATED ORAL at 00:04

## 2019-03-15 RX ADMIN — TRAMADOL HYDROCHLORIDE 50 MG: 50 TABLET, COATED ORAL at 21:21

## 2019-03-15 RX ADMIN — ACETAMINOPHEN 975 MG: 325 TABLET, FILM COATED ORAL at 15:59

## 2019-03-15 RX ADMIN — MUPIROCIN 0.5 G: 20 OINTMENT TOPICAL at 08:37

## 2019-03-15 RX ADMIN — PANTOPRAZOLE SODIUM 40 MG: 40 TABLET, DELAYED RELEASE ORAL at 08:37

## 2019-03-15 RX ADMIN — TRAMADOL HYDROCHLORIDE 50 MG: 50 TABLET, COATED ORAL at 05:59

## 2019-03-15 RX ADMIN — HEPARIN SODIUM 5000 UNITS: 5000 INJECTION, SOLUTION INTRAVENOUS; SUBCUTANEOUS at 08:35

## 2019-03-15 RX ADMIN — ACETAMINOPHEN 975 MG: 325 TABLET, FILM COATED ORAL at 00:04

## 2019-03-15 RX ADMIN — LIDOCAINE 1 PATCH: 560 PATCH PERCUTANEOUS; TOPICAL; TRANSDERMAL at 08:37

## 2019-03-15 RX ADMIN — SODIUM PHOSPHATE, MONOBASIC, MONOHYDRATE 10 MMOL: 276; 142 INJECTION, SOLUTION INTRAVENOUS at 06:22

## 2019-03-15 RX ADMIN — AMLODIPINE BESYLATE 5 MG: 5 TABLET ORAL at 08:35

## 2019-03-15 RX ADMIN — GABAPENTIN 300 MG: 300 CAPSULE ORAL at 08:36

## 2019-03-15 RX ADMIN — METHOCARBAMOL TABLETS 750 MG: 750 TABLET, COATED ORAL at 02:16

## 2019-03-15 RX ADMIN — METHOCARBAMOL TABLETS 750 MG: 750 TABLET, COATED ORAL at 10:15

## 2019-03-15 RX ADMIN — LOSARTAN POTASSIUM 25 MG: 25 TABLET ORAL at 08:36

## 2019-03-15 RX ADMIN — GUAIFENESIN 10 ML: 200 SOLUTION ORAL at 21:21

## 2019-03-15 RX ADMIN — POTASSIUM CHLORIDE 20 MEQ: 1500 TABLET, EXTENDED RELEASE ORAL at 05:44

## 2019-03-15 RX ADMIN — LEVOTHYROXINE SODIUM 50 MCG: 50 TABLET ORAL at 08:37

## 2019-03-15 RX ADMIN — ACETAMINOPHEN 650 MG: 325 TABLET, FILM COATED ORAL at 23:03

## 2019-03-15 RX ADMIN — HEPARIN SODIUM 5000 UNITS: 5000 INJECTION, SOLUTION INTRAVENOUS; SUBCUTANEOUS at 00:04

## 2019-03-15 RX ADMIN — ASPIRIN 325 MG: 325 TABLET, DELAYED RELEASE ORAL at 08:35

## 2019-03-15 RX ADMIN — MUPIROCIN 0.5 G: 20 OINTMENT TOPICAL at 21:12

## 2019-03-15 RX ADMIN — TRAMADOL HYDROCHLORIDE 50 MG: 50 TABLET, COATED ORAL at 13:13

## 2019-03-15 RX ADMIN — ACETAMINOPHEN 975 MG: 325 TABLET, FILM COATED ORAL at 08:35

## 2019-03-15 RX ADMIN — FENTANYL CITRATE 50 MCG: 50 INJECTION, SOLUTION INTRAMUSCULAR; INTRAVENOUS at 04:11

## 2019-03-15 RX ADMIN — ATORVASTATIN CALCIUM 40 MG: 40 TABLET, FILM COATED ORAL at 21:06

## 2019-03-15 RX ADMIN — METHOCARBAMOL TABLETS 750 MG: 750 TABLET, COATED ORAL at 21:11

## 2019-03-15 RX ADMIN — HEPARIN SODIUM 5000 UNITS: 5000 INJECTION, SOLUTION INTRAVENOUS; SUBCUTANEOUS at 23:28

## 2019-03-15 RX ADMIN — FUROSEMIDE 20 MG: 10 INJECTION, SOLUTION INTRAVENOUS at 13:26

## 2019-03-15 ASSESSMENT — ACTIVITIES OF DAILY LIVING (ADL)
ADLS_ACUITY_SCORE: 14

## 2019-03-15 ASSESSMENT — MIFFLIN-ST. JEOR
SCORE: 2436.63
SCORE: 2461.63

## 2019-03-15 NOTE — PROGRESS NOTES
[facility]  EP Cardiology Progress Note  Date of Service: 03/15/2019  Primary Cardiologist: Dr. Ag    Assessment & Plan    Edilberto Brooks is a 48 year old male with past medical history significant for hypertension, obesity, family history of CAD admitted on 3/6/2019 with chest discomfort and diagnosed with NSTEMI.  He underwent a coronary artery bypass grafting x4, LIMA-LAD, SVG-DIAG, SVG-OM, SVG-LPDA; endoscopic harvest left greater saphenous vein on 3/12/2019.   EP was asked to see patient on POD #2 due to sinus asystole with pauses up to about 3.4 seconds.  He received 50 mg of metoprolol on 3/13/2019.      His pacemaker setting was decreased to 35 bpm. Overnight he was having bradycardia and his pacemaker setting was increased to 50 bmp.      He had an episode of syncope in approximately 2014.        Interval History  Patient is improving from surgery.  He understands he needs to follow up with sleep medicine on outpatient.  No pauses overnight.  He continues to have chest tubes and temporary pacemaker in place.     Assessment/Plan:  1.  Sinus asystole  Pauses are occurring at night   okay to pull temporary pacemaker it will be unlikely patient will have recurrent and symptomatic bradycardia    Plan:    No beta blockers at this time    Recommend sleep study as outpatient    Will consider permanent pacemaker if patient is symptomatic with bradycardia and pauses    2.  Coronary artery disease s/p CABG x 4 on 3/12/2019    Plan:    No beta blocker at this time due to bradycardia.    Continue aspirin 81 mg     Atorvastatin 40 mg daily     Lifestyle modifications encouraged including weight loss, cardiac rehab    Outpatient sleep study      3. Hypertension  Monitored by CV surgery    Plan:    Amlodipine 5 mg daily    Losartan 25 mg daily    Weight loss encouraged      KEATON Amador CNP  P Heart  Text Page  (M-F 7:30 am - 4:00 pm)        Physical Exam   Temp: 98.1  F (36.7  C) Temp src: Oral  BP: 130/81   Heart Rate: 67 Resp: 13 SpO2: 98 % O2 Device: Nasal cannula Oxygen Delivery: 3 LPM  Vitals:    03/12/19 0527 03/13/19 0500 03/15/19 0559   Weight: 145.9 kg (321 lb 9.6 oz) 149.2 kg (328 lb 14.8 oz) (!) 150.1 kg (330 lb 14.6 oz)       GEN:  In general, this is a obese patient Patient ambulatory.  HEENT:  Pupils normal size, equal, and round.   NECK: Supple, no asymmetry, masses, or scars appreciated.  C/V:  Regular rate and rhythm  RESP: Respirations are unlabored.   GI: Abdomen soft, nontender, nondistended.   EXTREM: 1+ LE edema.   MS: Large joints without obvious swelling or erythema.   VASC: Radial and dorsalis pedis are normal in volumes and symmetric bilaterally.   NEURO: Alert and oriented  PSYCH: Normal affect.  SKIN: sternal incision healing; chest tube and temporary pacer in place    Medications     IV fluid REPLACEMENT ONLY       dextrose 5% and 0.45% NaCl + KCl 20 mEq/L Stopped (03/13/19 1700)     dextrose 5% and 0.45% NaCl       sodium chloride Stopped (03/13/19 1700)       acetaminophen  975 mg Oral Q8H     amLODIPine  5 mg Oral Daily     aspirin  325 mg Oral Daily     atorvastatin  40 mg Oral At Bedtime     heparin  5,000 Units Subcutaneous Q8H     insulin aspart  1-7 Units Subcutaneous TID AC     insulin aspart  1-5 Units Subcutaneous At Bedtime     levothyroxine  50 mcg Oral Daily     lidocaine  1 patch Transdermal Q24H     lidocaine   Transdermal Q8H     lidocaine   Transdermal Q24h     losartan  25 mg Oral Daily     mupirocin  0.5 g Both Nostrils BID     pantoprazole  40 mg Oral QAM AC     polyethylene glycol  17 g Oral BID     senna-docusate  1 tablet Oral BID    Or     senna-docusate  2 tablet Oral BID     sodium chloride (PF)  3 mL Intracatheter Q8H     sodium chloride (PF)  3 mL Intracatheter Q8H       Data   Most Recent 3 CBC's:  Recent Labs   Lab Test 03/15/19  0437 03/14/19  1059 03/13/19  0420   WBC 10.2 11.9* 12.7*   HGB 10.4* 11.5* 11.8*   MCV 92 92 94    243 242      Most Recent 3 BMP's:  Recent Labs   Lab Test 03/15/19  0437 03/14/19  1059 03/13/19  1916 03/13/19  0420    139  --  142   POTASSIUM 3.7 3.8 4.5 4.4   CHLORIDE 110* 109  --  111*   CO2 26 22  --  26   BUN 25 26  --  20   CR 1.00 1.13  --  1.13   ANIONGAP 5 8  --  5   KAREN 7.9* 8.5  --  8.0*   GLC 98 107*  --  119*         Last 24 hours labs reviewed

## 2019-03-15 NOTE — PLAN OF CARE
PT-Attempted to see times 2 in a.m. First time, patient getting chest tubes pulled and second time patient reporting too fatigued due to not sleeping until after 5 a.m. This morning. Will see in pm instead.

## 2019-03-15 NOTE — PLAN OF CARE
Neuro: Pt is A & O x4. Complains of pain 5/10 in chest/incision. Pt has stated the meds he has been getting for his pain has not been adequately covering his pain.   CV: SR to SB while sleeping, 50-75% paced while sleeping. Bp stable. Minimal output through chest tubes. K & phosp replaced.   Pulm: Clear LS. Room air while awake. Pt trialed on Cpap, while on Cpap pt had episodes of apnea. RT switched pt over to BiPap with low settings, pt then refused Bipap. Pt now on NC @ 3L for episodes of desating while sleeping.   GI/: Adequate urine output. Large Bm.   Plan: Pt wanting to discuss pain management today, will continue to monitor.

## 2019-03-15 NOTE — PROGRESS NOTES
"  Cardiology Progress Note          Assessment and Plan:   Aware of occasional pacing at 50 bpm while he was asleep. Temporary pacing is now set at 30 bpm. No further asystole.  Ok to remove epicardial wire whenever the surgeon wants to.  Likelihood of having recurrent and symptomatic asystole is low.  Avoid beta blocker.  Outpatient sleep study may be beneficial.  Reconsider pacemaker only if he has symptomatic bradycardia or asystole.    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 CABG 3/12/2019. Normal EF.  Post op intermittent sinus asystole while in bed on 3/13/2019. Pt was about to fall to sleep but not sure if he was about to pass out. He received 50 mg of metoprolol on 3/12/2019, stopped afterwards. No previous syncope in the last 5 years. Stable sinus rhythm now.  Sign off    Samanta Jean Baptiste MD  Cardiology   [phone]                Diagnosis:     Patient Active Problem List   Diagnosis     NSTEMI (non-ST elevated myocardial infarction) (H)     CAD in native artery                Physical Exam:   Blood pressure 130/81, pulse 83, temperature 98.1  F (36.7  C), temperature source Oral, resp. rate 13, height 1.905 m (6' 3\"), weight (!) 150.1 kg (330 lb 14.6 oz), SpO2 98 %.  Wt Readings from Last 4 Encounters:   03/15/19 (!) 150.1 kg (330 lb 14.6 oz)      I/O last 3 completed shifts:  In: 1112.5 [P.O.:1040; I.V.:72.5]  Out: 1565 [Urine:1275; Chest Tube:290]    Constitutional: Alert, no apparent distress,    Lungs: No crackles or wheezing,    Cardiovascular: Regular rate and rhythm, normal S1 and S2, and no murmur,    Lymphm node  Neck  ENT  Neurologic  Abdomen: No enlargement  No jugular vein extension or carotid bruit  No apparent abnormality  No focal deficit  Normal bowel sounds, soft, no distension, no tender   Skin: No rashes, no cyanosis   Extremity: No edema          Medications:     Current Facility-Administered Medications:      acetaminophen (TYLENOL) tablet 650 mg, 650 mg, Oral, Q4H PRN, Kait, " MD Clarence, 650 mg at 03/15/19 0408     acetaminophen (TYLENOL) tablet 975 mg, 975 mg, Oral, Q8H, Clarence Carballo MD, 975 mg at 03/15/19 0835     albumin human 5 % injection 500-1,000 mL, 500-1,000 mL, Intravenous, Once PRN, Clarence Carballo MD     amLODIPine (NORVASC) tablet 5 mg, 5 mg, Oral, Daily, Don Leon MD, 5 mg at 03/15/19 0835     aspirin (ASA) EC tablet 325 mg, 325 mg, Oral, Daily, Rebecca Lowe PA-C, 325 mg at 03/15/19 0835     atorvastatin (LIPITOR) tablet 40 mg, 40 mg, Oral, At Bedtime, Clarence Carballo MD, 40 mg at 03/14/19 2210     bisacodyl (DULCOLAX) Suppository 10 mg, 10 mg, Rectal, Daily PRN, Don Leon MD     dextrose 10 % 1,000 mL infusion, , Intravenous, Continuous PRN, Clarence Carballo MD     dextrose 5% and 0.45% NaCl + KCl 20 mEq/L infusion, , Intravenous, Continuous, Clarence Carballo MD, Stopped at 03/13/19 1700     dextrose 5% and 0.45% NaCl infusion, , Intravenous, Continuous, Clarence Carballo MD     glucose gel 15-30 g, 15-30 g, Oral, Q15 Min PRN **OR** dextrose 50 % injection 25-50 mL, 25-50 mL, Intravenous, Q15 Min PRN **OR** glucagon injection 1 mg, 1 mg, Subcutaneous, Q15 Min PRN, Clarence Carballo MD     fentaNYL (PF) (SUBLIMAZE) injection  mcg,  mcg, Intravenous, Q1H PRN, Clarence Carballo MD, 50 mcg at 03/15/19 0411     guaiFENesin (ROBITUSSIN) 20 mg/mL solution 10 mL, 10 mL, Oral, Q4H PRN, Don Leon MD, 10 mL at 03/07/19 2214     heparin sodium injection 5,000 Units, 5,000 Units, Subcutaneous, Q8H, Clarence Carballo MD, 5,000 Units at 03/15/19 0835     hydrALAZINE (APRESOLINE) injection 10 mg, 10 mg, Intravenous, Q30 Min PRN, Clarence Carballo MD, 10 mg at 03/13/19 0703     insulin aspart (NovoLOG) inj (RAPID ACTING), 1-7 Units, Subcutaneous, TID AC, Rebecca Lowe PA-C, Stopped at 03/13/19 1252     insulin aspart (NovoLOG) inj (RAPID ACTING), 1-5 Units, Subcutaneous, At Bedtime, Rebecca Lowe PA-C     ipratropium - albuterol 0.5 mg/2.5 mg/3 mL  (DUONEB) neb solution 3 mL, 3 mL, Nebulization, Q4H PRN, Don Leon MD     levothyroxine (SYNTHROID/LEVOTHROID) tablet 50 mcg, 50 mcg, Oral, Daily, Taiwo Pugh MD, 50 mcg at 03/15/19 0837     Lidocaine (LIDOCARE) 4 % Patch 1 patch, 1 patch, Transdermal, Q24H, Clarence Carballo MD, 1 patch at 03/15/19 0837     lidocaine (LMX4) cream, , Topical, Q1H PRN, lCarence Carballo MD     lidocaine (LMX4) cream, , Topical, Q1H PRN, MarchDrew MD     lidocaine 1 % 0.1-1 mL, 0.1-1 mL, Other, Q1H PRN, Clarence Carballo MD     lidocaine 1 % 1 mL, 1 mL, Other, Q1H PRN, MarchDrew MD     lidocaine patch in PLACE, , Transdermal, Q8H, Clarence Carballo MD     lidocaine patch REMOVAL, , Transdermal, Q24h, Clarence Carballo MD     losartan (COZAAR) tablet 25 mg, 25 mg, Oral, Daily, Don Leon MD, 25 mg at 03/15/19 0836     magnesium hydroxide (MILK OF MAGNESIA) suspension 30 mL, 30 mL, Oral, Daily PRN, Don Leon MD     magnesium sulfate 2 g in water intermittent infusion, 2 g, Intravenous, Daily PRN, Clarence Carballo MD     magnesium sulfate 4 g in 100 mL sterile water (premade), 4 g, Intravenous, Q4H PRN, Clarence Carballo MD     melatonin tablet 1 mg, 1 mg, Oral, At Bedtime PRN, Don Leon MD     methocarbamol (ROBAXIN) tablet 750 mg, 750 mg, Oral, 4x Daily PRN, Clarence Carballo MD, 750 mg at 03/15/19 0216     metoclopramide (REGLAN) tablet 10 mg, 10 mg, Oral, Q6H PRN **OR** metoclopramide (REGLAN) injection 10 mg, 10 mg, Intravenous, Q6H PRN, Don Leon MD     mupirocin (BACTROBAN) 2 % ointment 0.5 g, 0.5 g, Both Nostrils, BID, Clarence Carballo MD, 0.5 g at 03/15/19 0837     naloxone (NARCAN) injection 0.1-0.4 mg, 0.1-0.4 mg, Intravenous, Q2 Min PRN, Clarence Carballo MD     ondansetron (ZOFRAN-ODT) ODT tab 4 mg, 4 mg, Oral, Q6H PRN **OR** ondansetron (ZOFRAN) injection 4 mg, 4 mg, Intravenous, Q6H PRN, Clarence Carballo MD     pantoprazole (PROTONIX) EC tablet 40 mg, 40 mg,  Oral, QAM AC, Don Leon MD, 40 mg at 03/15/19 0837     polyethylene glycol (MIRALAX/GLYCOLAX) Packet 17 g, 17 g, Oral, BID, Rebecca Lowe PA-C     potassium chloride (KLOR-CON) Packet 20-40 mEq, 20-40 mEq, Oral or Feeding Tube, Q2H PRN, Clarence Carballo MD     potassium chloride 10 mEq in 100 mL intermittent infusion with 10 mg lidocaine, 10 mEq, Intravenous, Q1H PRN, Clarence Carballo MD     potassium chloride 10 mEq in 100 mL sterile water intermittent infusion (premix), 10 mEq, Intravenous, Q1H PRN, Clarence Carballo MD     potassium chloride 20 mEq in 50 mL intermittent infusion, 20 mEq, Intravenous, Q1H PRN, Clarence Carballo MD     potassium chloride ER (K-DUR/KLOR-CON M) CR tablet 20-40 mEq, 20-40 mEq, Oral, Q2H PRN, Clarence Carballo MD, 20 mEq at 03/15/19 0544     prochlorperazine (COMPAZINE) injection 10 mg, 10 mg, Intravenous, Q6H PRN **OR** prochlorperazine (COMPAZINE) tablet 10 mg, 10 mg, Oral, Q6H PRN **OR** prochlorperazine (COMPAZINE) Suppository 25 mg, 25 mg, Rectal, Q12H PRN, Don Leon MD     senna-docusate (SENOKOT-S/PERICOLACE) 8.6-50 MG per tablet 1 tablet, 1 tablet, Oral, BID, 1 tablet at 03/12/19 2136 **OR** senna-docusate (SENOKOT-S/PERICOLACE) 8.6-50 MG per tablet 2 tablet, 2 tablet, Oral, BID, Clarence Carballo MD     senna-docusate (SENOKOT-S/PERICOLACE) 8.6-50 MG per tablet 1 tablet, 1 tablet, Oral, BID PRN **OR** senna-docusate (SENOKOT-S/PERICOLACE) 8.6-50 MG per tablet 2 tablet, 2 tablet, Oral, BID PRN, Don Leon MD     sodium chloride (PF) 0.9% PF flush 3 mL, 3 mL, Intracatheter, q1 min prn, Clarence Carballo MD     sodium chloride (PF) 0.9% PF flush 3 mL, 3 mL, Intracatheter, Q8H, Clarence Carballo MD, 3 mL at 03/14/19 0822     sodium chloride (PF) 0.9% PF flush 3 mL, 3 mL, Intracatheter, Q8H, Drew Logan MD, 3 mL at 03/14/19 1604     sodium chloride 0.9% infusion, , Intravenous, Continuous, Mike Rincon MD, Stopped at 03/13/19 1700     sodium  phosphate 10 mmol in D5W intermittent infusion, 10 mmol, Intravenous, Daily PRN, Clarence Carballo MD, 10 mmol at 03/15/19 0622     sodium phosphate 15 mmol in D5W intermittent infusion, 15 mmol, Intravenous, Daily PRN, Clarence Carballo MD     sodium phosphate 20 mmol in D5W intermittent infusion, 20 mmol, Intravenous, Q6H PRN, Clarence Carballo MD     sodium phosphate 25 mmol in D5W intermittent infusion, 25 mmol, Intravenous, Q8H PRN, Clarence Carballo MD     traMADol (ULTRAM) tablet 50 mg, 50 mg, Oral, Q6H PRN, Clarence Carballo MD, 50 mg at 03/15/19 0559           Lab results:        Recent Labs   Lab Test 03/15/19  0437 03/14/19  1059 03/13/19  1916 03/13/19  0420    139  --  142   POTASSIUM 3.7 3.8 4.5 4.4   CHLORIDE 110* 109  --  111*   KAREN 7.9* 8.5  --  8.0*   CO2 26 22  --  26   BUN 25 26  --  20   CR 1.00 1.13  --  1.13   GLC 98 107*  --  119*     Recent Labs   Lab Test 03/15/19  0437 03/14/19  1059 03/13/19  0420   HGB 10.4* 11.5* 11.8*   WBC 10.2 11.9* 12.7*    243 242     Recent Labs   Lab Test 03/12/19  1750 03/12/19  1436   INR 1.24* 1.63*     Recent Labs   Lab Test 03/07/19  0539 03/06/19  2138 03/06/19  2017   TROPI 1.771* 0.255* 0.036

## 2019-03-15 NOTE — PROGRESS NOTES
Pt up in chair for dinner, moved well.  VSS, O2 sats stable on RA.  Since changing temp pacer rate back to 50bpm pt has had no more low HR alarms even while napping for 60min.  CPAP tonight.  Chest tube site c/d/i.  A&Ox4.  Possible Tx to station 33 tomorrow.

## 2019-03-15 NOTE — PROGRESS NOTES
Patient started on CPAP +5 30%. Tolerated fairly well, however, CPAP machine kept alarming as patient would still have periods of apnea. Switched to Bipap mode 8/4 with a back up rate of 10 and 30% FiO2. Patient did not tolerate Bipap as well, and refused mask.     Leslie Zavala, RT

## 2019-03-15 NOTE — PROGRESS NOTES
[facility]  Cardiovascular and Thoracic Surgery Daily Note          Assessment and Plan:   POD # 3 s/p Coronary artery bypass grafting x4, LIMA-LAD, SVG-DIAG, SVG-OM, SVG-LPDA; endoscopic harvest left greater saphenous vein by Dr. Freddy Rincon    Main Plans for today:  1. Discontinue TPW and CTs (done)  2. Discontinue lozada catheter  3. Transfer to station 33  4. Diurese  5. Work with therapies  6. Pulm hygiene    CVS:   /140/80s; HR 60s/NSR. Occasionally requiring pacer. EP consulted for pauses - patient does have known history of ladan episodes- occurs during sleep - likely has LG and will need outpatient sleep study; CTs discontinued today.  NO BB 2/2 pauses. On ASA, statin, sub q heparin  Echocardiogram on 3/8/19 demonstrated an LVEF 55-60%  Pulm:  Extubated per protocol; O2 3 LPM, able to reach 1250 on IS; Encourage IS, C&DB, ambulating  Neuro:  Intact; pain well controlled with current regimen. Endorses mostly MSK pain in shoulders and chest wall.  Renal:  BL Creat 1-1.1; today 1.0; Preop weight 151.5 kg, today 150.1 kg  UO adequate, will give 20 mg IV lasix x1  BMP in AM  GI:  Has passed flatus; + BM  Continue bowel program  On PPI  :  Lozada present - discontinue today  Endo:   Preop A1C 6.3; managed on insulin drip postop, transitioned to sliding scale with goal BG <180  FEN:   Na+ 141; K+3.7; Replace lytes as needed; Tolerating diet  Orders Placed This Encounter      Regular Diet Adult    ID:   Temp (24hrs), Av.5  F (36.9  C), Min:98.1  F (36.7  C), Max:99.1  F (37.3  C)  Stress induced leukocytosis: WBC 10.3  Completed perioperative antibiotics  Heme:   Acute blood loss anemia and thrombocytopenia: Hgb 10.4; Plt 228  Proph:   Statin, ASA, subcutaneous heparin, PCD, PPI  Dispo:   Transfer to station 33.  Plan for discharge to home in 2-3 days.          Interval History:   States pain is fairly well managed on current regimen -CTs bothersome. Slept fairly well overnight. Appetite  "good. Is passing flatus + BM.  Breathing is good, having apnea during sleep, able to perform IS to 1250. Up in chair for meals, working with therapies. Denies sternal popping or clicking.         Medications:       acetaminophen  975 mg Oral Q8H     amLODIPine  5 mg Oral Daily     aspirin  325 mg Oral Daily     atorvastatin  40 mg Oral At Bedtime     heparin  5,000 Units Subcutaneous Q8H     insulin aspart  1-7 Units Subcutaneous TID AC     insulin aspart  1-5 Units Subcutaneous At Bedtime     levothyroxine  50 mcg Oral Daily     lidocaine  1 patch Transdermal Q24H     lidocaine   Transdermal Q8H     lidocaine   Transdermal Q24h     losartan  25 mg Oral Daily     mupirocin  0.5 g Both Nostrils BID     pantoprazole  40 mg Oral QAM AC     polyethylene glycol  17 g Oral BID     senna-docusate  1 tablet Oral BID    Or     senna-docusate  2 tablet Oral BID     sodium chloride (PF)  3 mL Intracatheter Q8H     sodium chloride (PF)  3 mL Intracatheter Q8H     albumin human, bisacodyl, IV fluid REPLACEMENT ONLY, glucose **OR** dextrose **OR** glucagon, guaiFENesin, hydrALAZINE, ipratropium - albuterol 0.5 mg/2.5 mg/3 mL, lidocaine 4%, lidocaine 4%, lidocaine (buffered or not buffered), lidocaine (buffered or not buffered), magnesium hydroxide, magnesium sulfate, magnesium sulfate, melatonin, methocarbamol, metoclopramide **OR** metoclopramide, naloxone, ondansetron **OR** ondansetron, potassium chloride, potassium chloride with lidocaine, potassium chloride, potassium chloride, potassium chloride, prochlorperazine **OR** prochlorperazine **OR** prochlorperazine, senna-docusate **OR** senna-docusate, sodium chloride (PF), sodium phosphate, sodium phosphate, sodium phosphate, sodium phosphate, traMADol          Physical Exam:   Vitals were reviewed  Blood pressure 118/61, pulse 83, temperature 98.1  F (36.7  C), temperature source Oral, resp. rate 13, height 1.905 m (6' 3\"), weight (!) 150.1 kg (330 lb 14.6 oz), SpO2 98 " %.  Rhythm: NSR on bedside monitor    Lungs: CTA/dim in bases    Cardiovascular: S1, S2, RRR, no m/g, +rub    Abdomen: Sl distended, soft, nontender, +BS    Extremeties: 1+ BLE edema    Incision(s): Midline sternal incision CDI    CT: To suction; no airleak, no crepitus    Weight:   Vitals:    03/09/19 0556 03/11/19 0659 03/12/19 0527 03/13/19 0500   Weight: 148.1 kg (326 lb 8 oz) 147.2 kg (324 lb 9.6 oz) 145.9 kg (321 lb 9.6 oz) 149.2 kg (328 lb 14.8 oz)    03/15/19 0559   Weight: (!) 150.1 kg (330 lb 14.6 oz)            Data:    All labs and imaging reviewed by me.  Labs:   Lab Results   Component Value Date    WBC 10.2 03/15/2019     Lab Results   Component Value Date    RBC 3.39 03/15/2019     Lab Results   Component Value Date    HGB 10.4 03/15/2019     Lab Results   Component Value Date    HCT 31.2 03/15/2019     No components found for: MCT  Lab Results   Component Value Date    MCV 92 03/15/2019     Lab Results   Component Value Date    MCH 30.7 03/15/2019     Lab Results   Component Value Date    MCHC 33.3 03/15/2019     Lab Results   Component Value Date    RDW 13.7 03/15/2019     Lab Results   Component Value Date     03/15/2019       Last Basic Metabolic Panel:  Lab Results   Component Value Date     03/15/2019      Lab Results   Component Value Date    POTASSIUM 4.3 03/15/2019     Lab Results   Component Value Date    CHLORIDE 110 03/15/2019     Lab Results   Component Value Date    KAREN 7.9 03/15/2019     Lab Results   Component Value Date    CO2 26 03/15/2019     Lab Results   Component Value Date    BUN 25 03/15/2019     Lab Results   Component Value Date    CR 1.00 03/15/2019     Lab Results   Component Value Date    GLC 98 03/15/2019       CXR: No new imaging today    AMA Charles, CCRN-[facility] [place]  CV Surgery  [place] Pager: [phone]  Personal Pager: [phone]

## 2019-03-15 NOTE — PROGRESS NOTES
[facility]    Hospitalist Progress Note      Assessment & Plan   Edilberto Brooks is a 48 year old male with hx of HTN, who presented to the emergency room with anterior chest pressure. He is noted to have elevated troponin and admitted for NSTEMI.  Cardiac cath revealed multivessel disease and s/p CABG x 4 on 3/12     Non-ST-elevation myocardial infarction   CAD s/p CABGx4 (LIMA-LAD, SVG-DIAG, SVG-OM, SVG-LPDA) on 3/12  Patient presented with chest pain. Trop noted to be elevated(0.036--->0.255---1.771). TTE showed EF of 55-60%, no WMA. Coronary angiogram on 3/7 reveals multivessel disease. He is now s/p CABG.  Chest tubes are removed 3/15  - routine post op care per CV surgery  - Continue aspirin, Losartan, lipitor  - metoprolol stopped due to pauses and bradycardia. Currently in NSR.        Hypertension/ hyperlipidemia  - meds as above. PTA Dyazide was stopped on admission due to hypokalemia.      Suspected Right ankle gout. Improved/resolved.   Patient with ankle pain and swelling. No erythema around the joint. Stared on colchicine on 3/10. Pain feels slightly better. Ortho evaluated and felt likely gout. Given drug interaction in statin and colchicine, colchicine stopped on 3/11 and prednisone started.   Prednisone is quickly tapered and stopped on 3/15. Swelling is resolved and moving ankle joint without difficulty/pain.     Prediabetes  Post op hyperglycemia:  A1c 6.3%. Not on meds.  - diet, wt loss and life style modifications advised  - sliding scale insulin currently, but not requiring in last 24 hrs. Could consider discontinuing if no requirement in the next 24hrs.     Per social work note, patient's sister concerned about depression and requested psych consult. Per discussion with patient on 3/13, he does not feel depressed. He denies suicidal thoughts.  Deferring pscy consult at this time.      DVT Prophylaxis: Heparin sq  Code Status: Full Code    Disposition: Expected discharge pending  post-op course per CV surgery    Discussed with Roxi SRINIVASAN with CV surgery. Hospitalist service will sign off at this time.      Violeta Dennis MD  Text Page  (7am to 6pm)    Interval History   Chest wall pain is better. No right ankle pain.   Chest tubes are out.   Not requiring sliding scale insulin in last 24 hrs.     -Data reviewed today: I reviewed all new labs and imaging results over the last 24 hours. I personally reviewed no images or EKG's today.    Physical Exam   Temp: 98.1  F (36.7  C) Temp src: Oral BP: 118/61   Heart Rate: 54 Resp: 13 SpO2: 98 % O2 Device: None (Room air) Oxygen Delivery: 3 LPM  Vitals:    03/12/19 0527 03/13/19 0500 03/15/19 0559   Weight: 145.9 kg (321 lb 9.6 oz) 149.2 kg (328 lb 14.8 oz) (!) 150.1 kg (330 lb 14.6 oz)     Vital Signs with Ranges  Temp:  [98.1  F (36.7  C)-99.1  F (37.3  C)] 98.1  F (36.7  C)  Heart Rate:  [54-96] 54  Resp:  [8-21] 13  BP: (111-141)/(60-90) 118/61  FiO2 (%):  [30 %] 30 %  SpO2:  [92 %-100 %] 98 %  I/O last 3 completed shifts:  In: 732.5 [P.O.:660; I.V.:72.5]  Out: 1385 [Urine:1185; Chest Tube:200]    Constitutional: Alert, awake and no apparent distress  Respiratory: Clear to auscultation bialterally anterior  Cardiovascular: regular rate and rhythm  GI: soft and non tender  Other:right ankle with with good ROM without pain, no effusion    Medications     IV fluid REPLACEMENT ONLY       dextrose 5% and 0.45% NaCl         acetaminophen  975 mg Oral Q8H     amLODIPine  5 mg Oral Daily     aspirin  325 mg Oral Daily     atorvastatin  40 mg Oral At Bedtime     heparin  5,000 Units Subcutaneous Q8H     insulin aspart  1-7 Units Subcutaneous TID AC     insulin aspart  1-5 Units Subcutaneous At Bedtime     levothyroxine  50 mcg Oral Daily     lidocaine  1 patch Transdermal Q24H     lidocaine   Transdermal Q8H     lidocaine   Transdermal Q24h     losartan  25 mg Oral Daily     mupirocin  0.5 g Both Nostrils BID     pantoprazole  40 mg Oral QAM AC      polyethylene glycol  17 g Oral BID     senna-docusate  1 tablet Oral BID    Or     senna-docusate  2 tablet Oral BID     sodium chloride (PF)  3 mL Intracatheter Q8H     sodium chloride (PF)  3 mL Intracatheter Q8H       Data   Recent Labs   Lab 03/15/19  1208 03/15/19  0437 03/14/19  1059  03/13/19  0420 03/12/19  1750  03/12/19  1436   WBC  --  10.2 11.9*  --  12.7* 16.5*  --  28.8*   HGB  --  10.4* 11.5*  --  11.8* 12.4*   < > 11.1*   MCV  --  92 92  --  94 93  --  93   PLT  --  228 243  --  242 224  --  239   INR  --   --   --   --   --  1.24*  --  1.63*   NA  --  141 139  --  142 144   < > 143   POTASSIUM 4.3 3.7 3.8   < > 4.4 5.0   < > 4.1   CHLORIDE  --  110* 109  --  111* 113*  --  114*   CO2  --  26 22  --  26 23  --  23   BUN  --  25 26  --  20 20  --  18   CR  --  1.00 1.13  --  1.13 0.98  --  1.01   ANIONGAP  --  5 8  --  5 8  --  6   KAREN  --  7.9* 8.5  --  8.0* 8.4*  --  9.7   GLC  --  98 107*  --  119* 136*  --  147*   ALBUMIN  --   --   --   --  2.7* 2.8*  --  2.7*   PROTTOTAL  --   --   --   --  5.7* 5.8*  --  5.5*   BILITOTAL  --   --   --   --  0.4 0.6  --  0.5   ALKPHOS  --   --   --   --  68 74  --  76   ALT  --   --   --   --  46 54  --  50   AST  --   --   --   --  39 57*  --  43    < > = values in this interval not displayed.       No results found for this or any previous visit (from the past 24 hour(s)).

## 2019-03-15 NOTE — CONSULTS
Consult Date:  03/14/2019      REFERRING PHYSICIAN:  Dr. Rincon      REASON FOR CONSULTATION:  Sinus asystole.      HISTORY OF PRESENT ILLNESS:  Mr. Brooks is a 48-year-old white male who was admitted for evaluation of chest pain.  He was found to have troponin elevation and was diagnosed to have non-ST elevation myocardial infarct.  The initial echocardiography showed normal ejection fraction.  The coronary angiography on 03/07 showed severe stenosis in multiple vessels.  He underwent coronary bypass surgery on 03/12 successfully.  He has been extubated.  Yesterday morning he received 50 mg of metoprolol.  Yesterday evening he developed sinus asystole with pauses up to about 3.4 seconds while he was in bed rest.  He did not have apparent symptoms, but he could not exclude the possibility that he was about to pass out or he was about to fall to sleep around the time of asystole occurrence.  Since the observation of sinus asystole, metoprolol has been discontinued.  He is not on any bradycardia medications at the present time.  He has been in stable sinus rhythm for the last 12 hours.  His current complaint is chest pain from the sternal incision, but he has no shortness of breath.      The patient had a syncope about 5 years ago.  He has not had any dizziness or other symptoms in the recent past.      PAST MEDICAL HISTORY:  Obesity, hypertension.      FAMILY HISTORY:  Noncontributory to sinus asystole.      SOCIAL HISTORY:  He works as an  and mostly does this work.  He denies smoking or alcohol abuse.      REVIEW OF SYSTEMS:  Comprehensive review of systems showed no fever, cough or diarrhea.      CURRENT MEDICATIONS:  Amlodipine 5 mg p.o. daily, aspirin 325 mg p.o. daily, Lipitor 40 mg p.o. daily, Lasix 20 mg IV once, levothyroxine 50 mcg p.o. daily.      ALLERGIES:  NONE.      PHYSICAL EXAMINATION:   GENERAL:  He is alert and oriented without acute distress.   VITAL SIGNS:  Blood pressure was 141/70, heart  rate 77 beats per minute, temperature 99.1 degrees, oxygen saturation 96%.   HEENT:  Unremarkable.    SKIN:   There was no skin rash.     LYMPHATICS:  No lymph node enlargement.   LUNGS:  Clear.     CHEST:  The sternal incision has no draining.   HEART:  Rhythm was regular, and heart sounds were normal.  There was a friction rub.   ABDOMEN:  Examination showed severe obesity.   EXTREMITIES:  There was no pedal edema.   NEUROLOGIC:  Showed no focal deficit.      ASSESSMENT AND RECOMMENDATIONS:  Mr. Delgado is a 48-year-old white male with a post-coronary bypass surgery.  He had intermittent sinus asystole while in bed rest.  At this point, it is uncertain if he has any symptoms during sinus asystole.  I agree for discontinuation of metoprolol which is not critical in the presence of a normal ejection fraction.  We will continue to monitor the patient for possible recurrence of sinus asystole.  Pacemaker implantation is considered only if he has recurrent asystole with apparent symptoms.         LIV FARIAS MD             D: 2019   T: 03/15/2019   MT: VIDA      Name:     SALOMON DELGADO   MRN:      [id number]        Account:       [id number]   :      1970           Consult Date:  2019      Document: [id number]       cc: Mike Rincon MD

## 2019-03-15 NOTE — PLAN OF CARE
Discharge Planner PT   Patient plan for discharge: TCU  Current status: Patient needs mod assist of 1 to 2 for sup to sit. Sit to stand with supervision and tolerated amb about 300 feet with supervision. VSS through session. See vital signs flow sheet.   Barriers to return to prior living situation: Lives alone and doesn't have anyone to stay with him. Assist needed for bed mobility.  Recommendations for discharge: TCU followed by phase II CR once home.  Rationale for recommendations: Patient would benefit from continued skilled PT to progress his independence prior to discharging home. Would benefit from OPCR to progress activity while being monitored.        Entered by: Pamela Thornton 03/15/2019 5:23 PM

## 2019-03-15 NOTE — PLAN OF CARE
Patient is alert + oriented x 4, rates pain 3 since chest tubes out ,sinus rhythm, No pauses noted, room air with sats 96-98%, lungs clear, Is to 2500, sternal incision intact no drainage+ecchymotic, neville dc'd @ 1500 due to void, sister at bedside this afternoon ,questions answered and positive reassurance given, patient informed of transfer to room 306

## 2019-03-16 ENCOUNTER — APPOINTMENT (OUTPATIENT)
Dept: PHYSICAL THERAPY | Facility: CLINIC | Age: 49
DRG: 234 | End: 2019-03-16
Payer: COMMERCIAL

## 2019-03-16 ENCOUNTER — APPOINTMENT (OUTPATIENT)
Dept: GENERAL RADIOLOGY | Facility: CLINIC | Age: 49
DRG: 234 | End: 2019-03-16
Attending: THORACIC SURGERY (CARDIOTHORACIC VASCULAR SURGERY)
Payer: COMMERCIAL

## 2019-03-16 LAB
ANION GAP SERPL CALCULATED.3IONS-SCNC: 7 MMOL/L (ref 3–14)
BUN SERPL-MCNC: 23 MG/DL (ref 7–30)
CALCIUM SERPL-MCNC: 8.2 MG/DL (ref 8.5–10.1)
CHLORIDE SERPL-SCNC: 104 MMOL/L (ref 94–109)
CO2 SERPL-SCNC: 27 MMOL/L (ref 20–32)
CREAT SERPL-MCNC: 0.89 MG/DL (ref 0.66–1.25)
GFR SERPL CREATININE-BSD FRML MDRD: >90 ML/MIN/{1.73_M2}
GLUCOSE BLDC GLUCOMTR-MCNC: 106 MG/DL (ref 70–99)
GLUCOSE BLDC GLUCOMTR-MCNC: 75 MG/DL (ref 70–99)
GLUCOSE BLDC GLUCOMTR-MCNC: 86 MG/DL (ref 70–99)
GLUCOSE BLDC GLUCOMTR-MCNC: 97 MG/DL (ref 70–99)
GLUCOSE SERPL-MCNC: 90 MG/DL (ref 70–99)
PHOSPHATE SERPL-MCNC: 3.3 MG/DL (ref 2.5–4.5)
PLATELET # BLD AUTO: 327 10E9/L (ref 150–450)
POTASSIUM SERPL-SCNC: 3.3 MMOL/L (ref 3.4–5.3)
POTASSIUM SERPL-SCNC: 3.7 MMOL/L (ref 3.4–5.3)
SODIUM SERPL-SCNC: 138 MMOL/L (ref 133–144)

## 2019-03-16 PROCEDURE — 25000132 ZZH RX MED GY IP 250 OP 250 PS 637: Performed by: STUDENT IN AN ORGANIZED HEALTH CARE EDUCATION/TRAINING PROGRAM

## 2019-03-16 PROCEDURE — 84132 ASSAY OF SERUM POTASSIUM: CPT | Performed by: HOSPITALIST

## 2019-03-16 PROCEDURE — 97110 THERAPEUTIC EXERCISES: CPT | Mod: GP | Performed by: PHYSICAL THERAPIST

## 2019-03-16 PROCEDURE — 25000128 H RX IP 250 OP 636: Performed by: STUDENT IN AN ORGANIZED HEALTH CARE EDUCATION/TRAINING PROGRAM

## 2019-03-16 PROCEDURE — 12000000 ZZH R&B MED SURG/OB

## 2019-03-16 PROCEDURE — 25000132 ZZH RX MED GY IP 250 OP 250 PS 637: Performed by: HOSPITALIST

## 2019-03-16 PROCEDURE — 25000128 H RX IP 250 OP 636: Performed by: NURSE PRACTITIONER

## 2019-03-16 PROCEDURE — 85049 AUTOMATED PLATELET COUNT: CPT | Performed by: PHYSICIAN ASSISTANT

## 2019-03-16 PROCEDURE — 25000132 ZZH RX MED GY IP 250 OP 250 PS 637: Performed by: NURSE PRACTITIONER

## 2019-03-16 PROCEDURE — 80048 BASIC METABOLIC PNL TOTAL CA: CPT | Performed by: PHYSICIAN ASSISTANT

## 2019-03-16 PROCEDURE — 71046 X-RAY EXAM CHEST 2 VIEWS: CPT

## 2019-03-16 PROCEDURE — 36415 COLL VENOUS BLD VENIPUNCTURE: CPT | Performed by: HOSPITALIST

## 2019-03-16 PROCEDURE — 84100 ASSAY OF PHOSPHORUS: CPT | Performed by: PHYSICIAN ASSISTANT

## 2019-03-16 PROCEDURE — 25000132 ZZH RX MED GY IP 250 OP 250 PS 637: Performed by: PHYSICIAN ASSISTANT

## 2019-03-16 PROCEDURE — 36415 COLL VENOUS BLD VENIPUNCTURE: CPT | Performed by: PHYSICIAN ASSISTANT

## 2019-03-16 PROCEDURE — 97530 THERAPEUTIC ACTIVITIES: CPT | Mod: GP | Performed by: PHYSICAL THERAPIST

## 2019-03-16 PROCEDURE — 00000146 ZZHCL STATISTIC GLUCOSE BY METER IP

## 2019-03-16 RX ORDER — GABAPENTIN 300 MG/1
300 CAPSULE ORAL 2 TIMES DAILY
Status: DISCONTINUED | OUTPATIENT
Start: 2019-03-16 | End: 2019-03-18 | Stop reason: HOSPADM

## 2019-03-16 RX ORDER — ACETAMINOPHEN 325 MG/1
975 TABLET ORAL EVERY 6 HOURS
Status: DISCONTINUED | OUTPATIENT
Start: 2019-03-16 | End: 2019-03-18 | Stop reason: HOSPADM

## 2019-03-16 RX ORDER — ROSUVASTATIN CALCIUM 20 MG/1
20 TABLET, COATED ORAL DAILY
Status: DISCONTINUED | OUTPATIENT
Start: 2019-03-16 | End: 2019-03-18 | Stop reason: HOSPADM

## 2019-03-16 RX ADMIN — POTASSIUM CHLORIDE 20 MEQ: 1500 TABLET, EXTENDED RELEASE ORAL at 19:26

## 2019-03-16 RX ADMIN — LIDOCAINE 1 PATCH: 560 PATCH PERCUTANEOUS; TOPICAL; TRANSDERMAL at 08:34

## 2019-03-16 RX ADMIN — ASPIRIN 325 MG: 325 TABLET, DELAYED RELEASE ORAL at 08:34

## 2019-03-16 RX ADMIN — GABAPENTIN 300 MG: 300 CAPSULE ORAL at 10:15

## 2019-03-16 RX ADMIN — HEPARIN SODIUM 5000 UNITS: 5000 INJECTION, SOLUTION INTRAVENOUS; SUBCUTANEOUS at 16:24

## 2019-03-16 RX ADMIN — GABAPENTIN 300 MG: 300 CAPSULE ORAL at 21:49

## 2019-03-16 RX ADMIN — ACETAMINOPHEN 975 MG: 325 TABLET, FILM COATED ORAL at 19:26

## 2019-03-16 RX ADMIN — FUROSEMIDE 20 MG: 10 INJECTION, SOLUTION INTRAVENOUS at 19:26

## 2019-03-16 RX ADMIN — AMLODIPINE BESYLATE 5 MG: 5 TABLET ORAL at 08:34

## 2019-03-16 RX ADMIN — METHOCARBAMOL TABLETS 750 MG: 750 TABLET, COATED ORAL at 07:31

## 2019-03-16 RX ADMIN — LOSARTAN POTASSIUM 25 MG: 25 TABLET ORAL at 08:34

## 2019-03-16 RX ADMIN — GUAIFENESIN 10 ML: 200 SOLUTION ORAL at 01:07

## 2019-03-16 RX ADMIN — TRAMADOL HYDROCHLORIDE 50 MG: 50 TABLET, COATED ORAL at 03:23

## 2019-03-16 RX ADMIN — TRAMADOL HYDROCHLORIDE 50 MG: 50 TABLET, COATED ORAL at 12:26

## 2019-03-16 RX ADMIN — PANTOPRAZOLE SODIUM 40 MG: 40 TABLET, DELAYED RELEASE ORAL at 06:41

## 2019-03-16 RX ADMIN — LEVOTHYROXINE SODIUM 50 MCG: 50 TABLET ORAL at 08:34

## 2019-03-16 RX ADMIN — FUROSEMIDE 20 MG: 10 INJECTION, SOLUTION INTRAVENOUS at 06:40

## 2019-03-16 RX ADMIN — METHOCARBAMOL TABLETS 750 MG: 750 TABLET, COATED ORAL at 15:11

## 2019-03-16 RX ADMIN — ROSUVASTATIN CALCIUM 20 MG: 20 TABLET, FILM COATED ORAL at 10:15

## 2019-03-16 RX ADMIN — POTASSIUM CHLORIDE 40 MEQ: 1500 TABLET, EXTENDED RELEASE ORAL at 16:24

## 2019-03-16 RX ADMIN — METHOCARBAMOL TABLETS 750 MG: 750 TABLET, COATED ORAL at 21:50

## 2019-03-16 RX ADMIN — HEPARIN SODIUM 5000 UNITS: 5000 INJECTION, SOLUTION INTRAVENOUS; SUBCUTANEOUS at 08:34

## 2019-03-16 RX ADMIN — TRAMADOL HYDROCHLORIDE 50 MG: 50 TABLET, COATED ORAL at 18:38

## 2019-03-16 RX ADMIN — ACETAMINOPHEN 975 MG: 325 TABLET, FILM COATED ORAL at 13:37

## 2019-03-16 RX ADMIN — ACETAMINOPHEN 650 MG: 325 TABLET, FILM COATED ORAL at 07:31

## 2019-03-16 ASSESSMENT — ACTIVITIES OF DAILY LIVING (ADL)
ADLS_ACUITY_SCORE: 15
ADLS_ACUITY_SCORE: 15
ADLS_ACUITY_SCORE: 14

## 2019-03-16 ASSESSMENT — MIFFLIN-ST. JEOR: SCORE: 2439.36

## 2019-03-16 NOTE — PROGRESS NOTES
[facility]  Cardiovascular and Thoracic Surgery Daily Note          Assessment and Plan:   POD # 4 s/p Coronary artery bypass grafting x4, LIMA-LAD, SVG-DIAG, SVG-OM, SVG-LPDA; endoscopic harvest left greater saphenous vein by Dr. Freddy Rincon    Main Plans for today:  1. Diurese  2. Work with therapies  3. Pulm hygiene    CVS:   -140/60s; HR 70s/NSR. No further bradycardic episodes.  EP consulted for pauses - patient does have known history of ladan episodes- occurs during sleep - likely has LG and will need outpatient sleep study; CTs discontinued POD #3. CXR stable.  NO BB 2/2 pauses. On ASA, statin, sub q heparin  Echocardiogram on 3/8/19 demonstrated an LVEF 55-60%  Pulm:  Extubated per protocol; Saturating well on room air, able to reach 1135-8662 on IS; Encourage IS, C&DB, ambulating  Neuro:  Intact; has complaints regarding pain regimen -endorses mostly MSK pain in shoulders and chest wall.  Scheduled extra strength tylenol and gabapentin added to regimen. Patient agreeable to this.  Renal:  BL Creat 1-1.1; today 0.89 ; Preop weight 151.5 kg, today 147.9 kg, up 0.3 kg overnight, up 6 liters overall.  UO adequate, will schedule 20 mg IV lasix BID  BMP in AM  GI:  Has passed flatus; + BM  Continue bowel program  On PPI  :  Voiding well on own  Endo:   Preop A1C 6.3; managed on insulin drip postop, transitioned to sliding scale with goal BG <180  FEN:   Na+ 138; K+3.3; Replace lytes as needed; Tolerating diet  Orders Placed This Encounter      Regular Diet Adult    ID:   Temp (24hrs), Av.2  F (36.8  C), Min:97.8  F (36.6  C), Max:98.4  F (36.9  C)  Stress induced leukocytosis: WBC 10.3  Completed perioperative antibiotics  Heme:   Acute blood loss anemia and thrombocytopenia, resolved  Proph:   Statin, ASA, subcutaneous heparin, PCD, PPI  Dispo:   Continue on station 33.  Plan for discharge to home vs TCU in 1-2 days.          Interval History:   Having more pain than he is  comfortable with. Slept fairly well overnight. Appetite good. Is passing flatus + BM.  Breathing is good, able to perform IS to 1250. Up in chair for meals, working with therapies, ambulating in halls. Denies sternal popping or clicking.  Multiple questions answered throughout the day. Verbalizes that he wants to go to [facility] after hospitalization as he lives alone and does not have enough social support.         Medications:       acetaminophen  975 mg Oral Q6H     amLODIPine  5 mg Oral Daily     aspirin  325 mg Oral Daily     furosemide  20 mg Intravenous Q12H     gabapentin  300 mg Oral BID     heparin  5,000 Units Subcutaneous Q8H     insulin aspart  1-7 Units Subcutaneous TID AC     insulin aspart  1-5 Units Subcutaneous At Bedtime     levothyroxine  50 mcg Oral Daily     lidocaine  1 patch Transdermal Q24H     lidocaine   Transdermal Q8H     lidocaine   Transdermal Q24h     losartan  25 mg Oral Daily     mupirocin  0.5 g Both Nostrils BID     pantoprazole  40 mg Oral QAM AC     polyethylene glycol  17 g Oral BID     rosuvastatin  20 mg Oral Daily     senna-docusate  1 tablet Oral BID    Or     senna-docusate  2 tablet Oral BID     bisacodyl, glucose **OR** dextrose **OR** glucagon, guaiFENesin, hydrALAZINE, ipratropium - albuterol 0.5 mg/2.5 mg/3 mL, lidocaine 4%, lidocaine (buffered or not buffered), magnesium hydroxide, magnesium sulfate, magnesium sulfate, melatonin, methocarbamol, metoclopramide **OR** metoclopramide, naloxone, ondansetron **OR** ondansetron, potassium chloride, potassium chloride with lidocaine, potassium chloride, potassium chloride, potassium chloride, prochlorperazine **OR** prochlorperazine **OR** prochlorperazine, senna-docusate **OR** senna-docusate, sodium phosphate, sodium phosphate, sodium phosphate, sodium phosphate, traMADol          Physical Exam:   Vitals were reviewed  Blood pressure 129/67, pulse 78, temperature 98.4  F (36.9  C), temperature source Oral, resp. rate 18,  "height 1.905 m (6' 3\"), weight 147.9 kg (326 lb), SpO2 96 %.  Rhythm: NSR on tele    Lungs: CTA    Cardiovascular: S1, S2, RRR, no m/r/g    Abdomen: Soft, nontender, non distended, +BS    Extremeties: 1+ BLE edema    Incision(s): Midline sternal incision CDI, LLE incision CDI    CT: N/A    Weight:   Vitals:    03/12/19 0527 03/13/19 0500 03/15/19 0559 03/15/19 1857   Weight: 145.9 kg (321 lb 9.6 oz) 149.2 kg (328 lb 14.8 oz) (!) 150.1 kg (330 lb 14.6 oz) 147.6 kg (325 lb 6.4 oz)    03/16/19 0749   Weight: 147.9 kg (326 lb)            Data:    All labs and imaging reviewed by me.  Labs:   Lab Results   Component Value Date    WBC 10.2 03/15/2019     Lab Results   Component Value Date    RBC 3.39 03/15/2019     Lab Results   Component Value Date    HGB 10.4 03/15/2019     Lab Results   Component Value Date    HCT 31.2 03/15/2019     No components found for: MCT  Lab Results   Component Value Date    MCV 92 03/15/2019     Lab Results   Component Value Date    MCH 30.7 03/15/2019     Lab Results   Component Value Date    MCHC 33.3 03/15/2019     Lab Results   Component Value Date    RDW 13.7 03/15/2019     Lab Results   Component Value Date     03/15/2019       Last Basic Metabolic Panel:  Lab Results   Component Value Date     03/15/2019      Lab Results   Component Value Date    POTASSIUM 4.3 03/15/2019     Lab Results   Component Value Date    CHLORIDE 110 03/15/2019     Lab Results   Component Value Date    KAREN 7.9 03/15/2019     Lab Results   Component Value Date    CO2 26 03/15/2019     Lab Results   Component Value Date    BUN 25 03/15/2019     Lab Results   Component Value Date    CR 1.00 03/15/2019     Lab Results   Component Value Date    GLC 98 03/15/2019       CXR:   IMPRESSION:   1. Removal of right neck venous sheath. Removal of left base chest  tube. Very small left apex pneumothorax is newly suggested. Stable  sternotomy.   2. No acute cardiopulmonary disease. Mild bibasilar " atelectasis.  Stable borderline prominent heart.     AMA Charles, CCRN-CSC  CV Surgery  [facility] Pager: [phone]  Personal Pager: [phone]

## 2019-03-16 NOTE — PROVIDER NOTIFICATION
Paged CV surgery in regards to pt's morning X-ray.  Radiologist called and stated that the patient has a small new left apex pneumothorax.  Roxi called back and said to just continue to monitor.

## 2019-03-16 NOTE — PLAN OF CARE
Pt is alert and oriented. AVSS sinus rhythm. Pain control wit tylenol and ultram and robaxin. Lungs clear. Up wit 1 assist. Incision cdi.

## 2019-03-16 NOTE — PLAN OF CARE
"Discharge Planner PT   Patient plan for discharge: TCU  Current status: Declines education and trial of sup<>sit because of pain. States he will just buy a new bed so he doesn't have to go through the pain. Sit<>stand initial stand, pt with poor anterior weight shift and sits back on the chair. Second stand, contact guard assist up to standing with ease after review of anterior weight shift needed. Ambulated 10 min in the cruz no AD, negotiated 3 stairs with a rail, ability to converse with just slight breathlessness. Normal CV response, no signs/symptoms of activity intolerance. Edu on OP CR phase II and benefits, pt states he plans to go to \"[facility] anyway.\"  Barriers to return to prior living situation: Lives alone, states he has no one to assist.  Recommendations for discharge: Home with OP CR phase II  Rationale for recommendations: Suspect with repetition of in/out of bed with optimal mechanics and education pt would be able to do so without assist. Pt is well educated and able to verbalize understanding of signs and symptoms of exercise intolerance. Pt would benefit from OP CR phase II to continue progress exercise/activity tolerance as well as continue education for optimal heart health.        Entered by: Becca Ca 03/16/2019 11:53 AM       "

## 2019-03-16 NOTE — PLAN OF CARE
Pt arrived to floor at 1850, report given to nurse. Vitals were obtained, telemetry placed on patient, pt alert and oriented sitting in chair.

## 2019-03-16 NOTE — PLAN OF CARE
Vital Signs stable. Telemetry normal sinus rhythm. Alert and Oriented. Lung sounds clear but diminished in the bases, IS to 2500. Bowel sounds active and audible. Passing flatus. Regular diet. Denies nausea. Adequate urinary output. CMS intact ex +2 bilateral lower extremity edema. Sternal incision is open to air with liquid bandage closure, mild ecchymosis surrounds site.  [place] sites are open to air with liquid bandage closure, mild ecchymosis surrounds site. Chest tube dressing has marked amount of drainage on it, drainage has not extended.  Up with assist of 1. Pain controlled with Tramadol, Tylenol, and Robaxin.  No insulin coverage needed.

## 2019-03-17 ENCOUNTER — APPOINTMENT (OUTPATIENT)
Dept: PHYSICAL THERAPY | Facility: CLINIC | Age: 49
DRG: 234 | End: 2019-03-17
Payer: COMMERCIAL

## 2019-03-17 LAB
ANION GAP SERPL CALCULATED.3IONS-SCNC: 7 MMOL/L (ref 3–14)
BUN SERPL-MCNC: 21 MG/DL (ref 7–30)
CALCIUM SERPL-MCNC: 8.3 MG/DL (ref 8.5–10.1)
CHLORIDE SERPL-SCNC: 105 MMOL/L (ref 94–109)
CO2 SERPL-SCNC: 27 MMOL/L (ref 20–32)
CREAT SERPL-MCNC: 0.9 MG/DL (ref 0.66–1.25)
GFR SERPL CREATININE-BSD FRML MDRD: >90 ML/MIN/{1.73_M2}
GLUCOSE BLDC GLUCOMTR-MCNC: 102 MG/DL (ref 70–99)
GLUCOSE BLDC GLUCOMTR-MCNC: 84 MG/DL (ref 70–99)
GLUCOSE BLDC GLUCOMTR-MCNC: 91 MG/DL (ref 70–99)
GLUCOSE BLDC GLUCOMTR-MCNC: 96 MG/DL (ref 70–99)
GLUCOSE SERPL-MCNC: 87 MG/DL (ref 70–99)
POTASSIUM SERPL-SCNC: 3.6 MMOL/L (ref 3.4–5.3)
SODIUM SERPL-SCNC: 139 MMOL/L (ref 133–144)

## 2019-03-17 PROCEDURE — 80048 BASIC METABOLIC PNL TOTAL CA: CPT | Performed by: NURSE PRACTITIONER

## 2019-03-17 PROCEDURE — 00000146 ZZHCL STATISTIC GLUCOSE BY METER IP

## 2019-03-17 PROCEDURE — 25000132 ZZH RX MED GY IP 250 OP 250 PS 637: Performed by: NURSE PRACTITIONER

## 2019-03-17 PROCEDURE — 97110 THERAPEUTIC EXERCISES: CPT | Mod: GP | Performed by: PHYSICAL THERAPIST

## 2019-03-17 PROCEDURE — 25000132 ZZH RX MED GY IP 250 OP 250 PS 637: Performed by: HOSPITALIST

## 2019-03-17 PROCEDURE — 97530 THERAPEUTIC ACTIVITIES: CPT | Mod: GP

## 2019-03-17 PROCEDURE — 12000000 ZZH R&B MED SURG/OB

## 2019-03-17 PROCEDURE — 25000128 H RX IP 250 OP 636: Performed by: NURSE PRACTITIONER

## 2019-03-17 PROCEDURE — 25000132 ZZH RX MED GY IP 250 OP 250 PS 637: Performed by: STUDENT IN AN ORGANIZED HEALTH CARE EDUCATION/TRAINING PROGRAM

## 2019-03-17 PROCEDURE — 36415 COLL VENOUS BLD VENIPUNCTURE: CPT | Performed by: NURSE PRACTITIONER

## 2019-03-17 PROCEDURE — 25000132 ZZH RX MED GY IP 250 OP 250 PS 637: Performed by: PHYSICIAN ASSISTANT

## 2019-03-17 PROCEDURE — 97110 THERAPEUTIC EXERCISES: CPT | Mod: GP

## 2019-03-17 PROCEDURE — 97530 THERAPEUTIC ACTIVITIES: CPT | Mod: GP | Performed by: PHYSICAL THERAPIST

## 2019-03-17 PROCEDURE — 25000128 H RX IP 250 OP 636: Performed by: STUDENT IN AN ORGANIZED HEALTH CARE EDUCATION/TRAINING PROGRAM

## 2019-03-17 RX ORDER — TRIAMTERENE AND HYDROCHLOROTHIAZIDE 75; 50 MG/1; MG/1
1 TABLET ORAL EVERY MORNING
Status: DISCONTINUED | OUTPATIENT
Start: 2019-03-17 | End: 2019-03-18 | Stop reason: HOSPADM

## 2019-03-17 RX ADMIN — LEVOTHYROXINE SODIUM 50 MCG: 50 TABLET ORAL at 08:43

## 2019-03-17 RX ADMIN — ACETAMINOPHEN 975 MG: 325 TABLET, FILM COATED ORAL at 20:35

## 2019-03-17 RX ADMIN — METHOCARBAMOL TABLETS 750 MG: 750 TABLET, COATED ORAL at 08:51

## 2019-03-17 RX ADMIN — ACETAMINOPHEN 975 MG: 325 TABLET, FILM COATED ORAL at 08:42

## 2019-03-17 RX ADMIN — ASPIRIN 325 MG: 325 TABLET, DELAYED RELEASE ORAL at 08:43

## 2019-03-17 RX ADMIN — FUROSEMIDE 20 MG: 10 INJECTION, SOLUTION INTRAVENOUS at 06:46

## 2019-03-17 RX ADMIN — POTASSIUM CHLORIDE 20 MEQ: 1500 TABLET, EXTENDED RELEASE ORAL at 08:51

## 2019-03-17 RX ADMIN — AMLODIPINE BESYLATE 5 MG: 5 TABLET ORAL at 08:43

## 2019-03-17 RX ADMIN — ACETAMINOPHEN 975 MG: 325 TABLET, FILM COATED ORAL at 13:41

## 2019-03-17 RX ADMIN — HEPARIN SODIUM 5000 UNITS: 5000 INJECTION, SOLUTION INTRAVENOUS; SUBCUTANEOUS at 00:40

## 2019-03-17 RX ADMIN — GABAPENTIN 300 MG: 300 CAPSULE ORAL at 08:43

## 2019-03-17 RX ADMIN — PANTOPRAZOLE SODIUM 40 MG: 40 TABLET, DELAYED RELEASE ORAL at 06:45

## 2019-03-17 RX ADMIN — SENNOSIDES AND DOCUSATE SODIUM 2 TABLET: 8.6; 5 TABLET ORAL at 08:43

## 2019-03-17 RX ADMIN — LOSARTAN POTASSIUM 25 MG: 25 TABLET ORAL at 08:44

## 2019-03-17 RX ADMIN — HEPARIN SODIUM 5000 UNITS: 5000 INJECTION, SOLUTION INTRAVENOUS; SUBCUTANEOUS at 16:56

## 2019-03-17 RX ADMIN — ACETAMINOPHEN 975 MG: 325 TABLET, FILM COATED ORAL at 00:35

## 2019-03-17 RX ADMIN — GABAPENTIN 300 MG: 300 CAPSULE ORAL at 20:35

## 2019-03-17 RX ADMIN — TRIAMTERENE AND HYDROCHLOROTHIAZIDE 1 TABLET: 75; 50 TABLET ORAL at 13:41

## 2019-03-17 RX ADMIN — FUROSEMIDE 20 MG: 10 INJECTION, SOLUTION INTRAVENOUS at 18:37

## 2019-03-17 RX ADMIN — MUPIROCIN 0.5 G: 20 OINTMENT TOPICAL at 08:43

## 2019-03-17 RX ADMIN — HEPARIN SODIUM 5000 UNITS: 5000 INJECTION, SOLUTION INTRAVENOUS; SUBCUTANEOUS at 08:28

## 2019-03-17 RX ADMIN — TRAMADOL HYDROCHLORIDE 50 MG: 50 TABLET, COATED ORAL at 20:40

## 2019-03-17 RX ADMIN — POTASSIUM CHLORIDE 20 MEQ: 1500 TABLET, EXTENDED RELEASE ORAL at 00:35

## 2019-03-17 RX ADMIN — ROSUVASTATIN CALCIUM 20 MG: 20 TABLET, FILM COATED ORAL at 08:43

## 2019-03-17 RX ADMIN — TRAMADOL HYDROCHLORIDE 50 MG: 50 TABLET, COATED ORAL at 06:45

## 2019-03-17 RX ADMIN — LIDOCAINE 1 PATCH: 560 PATCH PERCUTANEOUS; TOPICAL; TRANSDERMAL at 08:52

## 2019-03-17 ASSESSMENT — ACTIVITIES OF DAILY LIVING (ADL)
ADLS_ACUITY_SCORE: 14
ADLS_ACUITY_SCORE: 15
ADLS_ACUITY_SCORE: 14

## 2019-03-17 ASSESSMENT — MIFFLIN-ST. JEOR: SCORE: 2441.63

## 2019-03-17 NOTE — PLAN OF CARE
A&Ox4. VSS on RA. Tele SR. Potassium 3.3, replaced. Recheck at 2330. Sternal incision CARLOTTA, WNL. Showered this evening. CT sites covered. Ambulated in cruz. LS clear. Tramadol, Robaxin and Tylenol for pain. Possible discharge tomorrow. Pt is requesting to discharge to TCU.

## 2019-03-17 NOTE — PLAN OF CARE
PT/CR: pt requests to move time secondary to visitors. Will try back as schedule permits. RN aware.

## 2019-03-17 NOTE — PLAN OF CARE
Vital Signs stable. Telemetry normal sinus rhythm. Alert and Oriented. Lung sounds clear but diminished in the bases, IS to 2500. Bowel sounds active and audible. Passing flatus. Regular diet. Denies nausea. Adequate urinary output. CMS intact ex +2 bilateral lower extremity edema. Sternal incision is open to air with liquid bandage closure, mild ecchymosis surrounds site.  [place] sites are open to air with liquid bandage closure, mild ecchymosis surrounds site. Chest tube dressing clean dry and intact. Mepilex to skin tear on left knee removed and is open to air.  Up with stand by assist. Pain controlled with scheduled Tylenol, and Robaxin.  No insulin coverage needed.

## 2019-03-17 NOTE — PLAN OF CARE
A/Ox4. VSS. On RA. Tele: NSR. LS clear. Using IS independently. +BS, +flatus, +bm. Voiding adequately. Sternal incision cdi/darryl. [place] sites ecchymotic cdi/darryl. Managing pain with scheduled tylenol. No coverage needed for blood sugar. Up with SBA.

## 2019-03-17 NOTE — PROGRESS NOTES
[facility]  Cardiovascular and Thoracic Surgery Daily Note          Assessment and Plan:   POD # 5 s/p Coronary artery bypass grafting x4, LIMA-LAD, SVG-DIAG, SVG-OM, SVG-LPDA; endoscopic harvest left greater saphenous vein by Dr. Freddy Rincon    Main Plans for today:  1. Diurese  2. Work with therapies  3. Pulm hygiene    CVS:   -130/70s; HR 60-70s/NSR. No further bradycardic episodes.  EP consulted for pauses - patient does have known history of ladan episodes- occurs during sleep - likely has LG and will need outpatient sleep study; CTs discontinued POD #3. CXR stable.  NO BB 2/2 pauses. On ASA, statin, sub q heparin. PTA triamterene reordered today.  Echocardiogram on 3/8/19 demonstrated an LVEF 55-60%  Pulm:  Extubated per protocol; Saturating well on room air, able to reach 1500 on IS; Encourage IS, C&DB, ambulating  Neuro:  Intact; pain better controlled today  Renal:  BL Creat 1-1.1; today 0.90 ; Preop weight 151.5 kg, today 148.1 kg, up 5 liters overall.  UO adequate,20 mg IV lasix BID  BMP in AM  GI:  Has passed flatus; + BM  Continue bowel program  On PPI  :  Voiding well on own  Endo:   Preop A1C 6.3; managed on insulin drip postop, transitioned to sliding scale with goal BG <180  FEN:   Na+ 138; K+3.3; Replace lytes as needed; Tolerating diet  Orders Placed This Encounter      Regular Diet Adult    ID:   Temp (24hrs), Av.3  F (36.8  C), Min:97.7  F (36.5  C), Max:99.1  F (37.3  C)  Stress induced leukocytosis, resolved  Completed perioperative antibiotics  Heme:   Acute blood loss anemia and thrombocytopenia  Proph:   Statin, ASA, subcutaneous heparin, PCD, PPI  Dispo:   Continue on station 33.  Could discharge home today but patient wants to await placement at [facility].          Interval History:   Pain control much improved. Showered yesterday.  Slept well overnight. Appetite good. Is passing flatus + BM.  Breathing is good, able to perform IS to 1500. Up in chair for meals,  "working with therapies, ambulating in halls. Denies sternal popping or clicking.  Continues to verbalizes that he wants to go to [facility] after hospitalization as he lives alone and does not have enough social support.         Medications:       acetaminophen  975 mg Oral Q6H     amLODIPine  5 mg Oral Daily     aspirin  325 mg Oral Daily     furosemide  20 mg Intravenous Q12H     gabapentin  300 mg Oral BID     heparin  5,000 Units Subcutaneous Q8H     insulin aspart  1-7 Units Subcutaneous TID AC     insulin aspart  1-5 Units Subcutaneous At Bedtime     levothyroxine  50 mcg Oral Daily     lidocaine  1 patch Transdermal Q24H     lidocaine   Transdermal Q8H     lidocaine   Transdermal Q24h     losartan  25 mg Oral Daily     mupirocin  0.5 g Both Nostrils BID     pantoprazole  40 mg Oral QAM AC     polyethylene glycol  17 g Oral BID     rosuvastatin  20 mg Oral Daily     senna-docusate  1 tablet Oral BID    Or     senna-docusate  2 tablet Oral BID     sodium chloride (PF)  3 mL Intracatheter Q8H     bisacodyl, glucose **OR** dextrose **OR** glucagon, guaiFENesin, hydrALAZINE, ipratropium - albuterol 0.5 mg/2.5 mg/3 mL, lidocaine 4%, lidocaine (buffered or not buffered), magnesium hydroxide, magnesium sulfate, magnesium sulfate, melatonin, methocarbamol, metoclopramide **OR** metoclopramide, naloxone, ondansetron **OR** ondansetron, potassium chloride, potassium chloride with lidocaine, potassium chloride, potassium chloride, potassium chloride, prochlorperazine **OR** prochlorperazine **OR** prochlorperazine, senna-docusate **OR** senna-docusate, sodium phosphate, sodium phosphate, sodium phosphate, sodium phosphate, traMADol          Physical Exam:   Vitals were reviewed  Blood pressure 145/74, pulse 71, temperature 97.8  F (36.6  C), temperature source Oral, resp. rate 18, height 1.905 m (6' 3\"), weight 148.1 kg (326 lb 8 oz), SpO2 96 %.  Rhythm: NSR on tele    Lungs: CTA    Cardiovascular: S1, S2, RRR, no " m/r/g    Abdomen: Soft, nontender, non distended, +BS    Extremeties: 1+ BLE edema    Incision(s): Midline sternal incision CDI, LLE incision CDI    CT: N/A    Weight:   Vitals:    03/13/19 0500 03/15/19 0559 03/15/19 1857 03/16/19 0749   Weight: 149.2 kg (328 lb 14.8 oz) (!) 150.1 kg (330 lb 14.6 oz) 147.6 kg (325 lb 6.4 oz) 147.9 kg (326 lb)    03/17/19 0549   Weight: 148.1 kg (326 lb 8 oz)            Data:    All labs and imaging reviewed by me.  Labs:   Last Comprehensive Metabolic Panel:  Sodium   Date Value Ref Range Status   03/17/2019 139 133 - 144 mmol/L Final     Potassium   Date Value Ref Range Status   03/17/2019 3.6 3.4 - 5.3 mmol/L Final     Chloride   Date Value Ref Range Status   03/17/2019 105 94 - 109 mmol/L Final     Carbon Dioxide   Date Value Ref Range Status   03/17/2019 27 20 - 32 mmol/L Final     Anion Gap   Date Value Ref Range Status   03/17/2019 7 3 - 14 mmol/L Final     Glucose   Date Value Ref Range Status   03/17/2019 87 70 - 99 mg/dL Final     Urea Nitrogen   Date Value Ref Range Status   03/17/2019 21 7 - 30 mg/dL Final     Creatinine   Date Value Ref Range Status   03/17/2019 0.90 0.66 - 1.25 mg/dL Final     GFR Estimate   Date Value Ref Range Status   03/17/2019 >90 >60 mL/min/[1.73_m2] Final     Comment:     Non  GFR Calc  Starting 12/18/2018, serum creatinine based estimated GFR (eGFR) will be   calculated using the Chronic Kidney Disease Epidemiology Collaboration   (CKD-EPI) equation.       Calcium   Date Value Ref Range Status   03/17/2019 8.3 (L) 8.5 - 10.1 mg/dL Final     CXR:   No new imaging today     AMA Charles, CCRN-CSC  CV Surgery  [facility] Pager: [phone]  Personal Pager: [phone]

## 2019-03-17 NOTE — PLAN OF CARE
"Discharge Planner PT   Patient plan for discharge: TCU  Current status: TM ambulation x 10 min and 30 seconds. Tolerated well with a max HR of 120, stable CV response. Pt slightly sweaty with slight SOB, but able to converse throughout. Rates activity at a \"3 or 4\" on the OMNI scale of effort. Completes sit<>stand independently. Independent with ambulation in the gym, supervision on the TM.  Barriers to return to prior living situation: None based on mobility. Pt concern is that he lives alone.  Recommendations for discharge: Home with OP CR phase II.   Rationale for recommendations: Pt will benefit from continued skilled rehab services to progress exercise and activity tolerance as well as continue education for optimal heart health.       Entered by: Becca Ca 03/17/2019 10:26 AM       "

## 2019-03-17 NOTE — PROGRESS NOTES
SENAIT:    I: SENAIT following for discharge planning. SENAIT spoke with Tressa today with [facility] and she has submitted updated clinical information to [facility] for insurance auth. Facility will have bed available for pt tomorrow, 3/18 if auth received, do not anticipate to hear anything back today being the weekend however will update this writer if approval given. SENAIT update CV surgery.    P: Plan for discharge to [facility] pending insurance auth 3/18.    PORFIRIO Becker, [facility]  Daytime (8:00am-4:30pm): [phone]  After-Hours SW Pager (4:30pm-11:30pm): [phone]

## 2019-03-18 ENCOUNTER — APPOINTMENT (OUTPATIENT)
Dept: PHYSICAL THERAPY | Facility: CLINIC | Age: 49
DRG: 234 | End: 2019-03-18
Payer: COMMERCIAL

## 2019-03-18 VITALS
OXYGEN SATURATION: 97 % | TEMPERATURE: 97.9 F | WEIGHT: 315 LBS | BODY MASS INDEX: 39.17 KG/M2 | HEIGHT: 75 IN | RESPIRATION RATE: 18 BRPM | DIASTOLIC BLOOD PRESSURE: 85 MMHG | HEART RATE: 77 BPM | SYSTOLIC BLOOD PRESSURE: 126 MMHG

## 2019-03-18 DIAGNOSIS — Z95.1 S/P CABG (CORONARY ARTERY BYPASS GRAFT): Primary | ICD-10-CM

## 2019-03-18 LAB
ANION GAP SERPL CALCULATED.3IONS-SCNC: 5 MMOL/L (ref 3–14)
BUN SERPL-MCNC: 20 MG/DL (ref 7–30)
CALCIUM SERPL-MCNC: 8.7 MG/DL (ref 8.5–10.1)
CHLORIDE SERPL-SCNC: 100 MMOL/L (ref 94–109)
CO2 SERPL-SCNC: 31 MMOL/L (ref 20–32)
CREAT SERPL-MCNC: 0.93 MG/DL (ref 0.66–1.25)
GFR SERPL CREATININE-BSD FRML MDRD: >90 ML/MIN/{1.73_M2}
GLUCOSE BLDC GLUCOMTR-MCNC: 91 MG/DL (ref 70–99)
GLUCOSE BLDC GLUCOMTR-MCNC: 93 MG/DL (ref 70–99)
GLUCOSE SERPL-MCNC: 92 MG/DL (ref 70–99)
MAGNESIUM SERPL-MCNC: 2.2 MG/DL (ref 1.6–2.3)
PHOSPHATE SERPL-MCNC: 4.3 MG/DL (ref 2.5–4.5)
POTASSIUM SERPL-SCNC: 3.6 MMOL/L (ref 3.4–5.3)
SODIUM SERPL-SCNC: 136 MMOL/L (ref 133–144)

## 2019-03-18 PROCEDURE — 25000128 H RX IP 250 OP 636: Performed by: STUDENT IN AN ORGANIZED HEALTH CARE EDUCATION/TRAINING PROGRAM

## 2019-03-18 PROCEDURE — 25000132 ZZH RX MED GY IP 250 OP 250 PS 637: Performed by: STUDENT IN AN ORGANIZED HEALTH CARE EDUCATION/TRAINING PROGRAM

## 2019-03-18 PROCEDURE — 25000132 ZZH RX MED GY IP 250 OP 250 PS 637: Performed by: HOSPITALIST

## 2019-03-18 PROCEDURE — 36415 COLL VENOUS BLD VENIPUNCTURE: CPT | Performed by: INTERNAL MEDICINE

## 2019-03-18 PROCEDURE — 97110 THERAPEUTIC EXERCISES: CPT | Mod: GP | Performed by: PHYSICAL THERAPIST

## 2019-03-18 PROCEDURE — 36415 COLL VENOUS BLD VENIPUNCTURE: CPT | Performed by: NURSE PRACTITIONER

## 2019-03-18 PROCEDURE — 25000132 ZZH RX MED GY IP 250 OP 250 PS 637: Performed by: PHYSICIAN ASSISTANT

## 2019-03-18 PROCEDURE — 25000128 H RX IP 250 OP 636: Performed by: NURSE PRACTITIONER

## 2019-03-18 PROCEDURE — 84100 ASSAY OF PHOSPHORUS: CPT | Performed by: INTERNAL MEDICINE

## 2019-03-18 PROCEDURE — 83735 ASSAY OF MAGNESIUM: CPT | Performed by: INTERNAL MEDICINE

## 2019-03-18 PROCEDURE — 25000132 ZZH RX MED GY IP 250 OP 250 PS 637: Performed by: NURSE PRACTITIONER

## 2019-03-18 PROCEDURE — 00000146 ZZHCL STATISTIC GLUCOSE BY METER IP

## 2019-03-18 PROCEDURE — 80048 BASIC METABOLIC PNL TOTAL CA: CPT | Performed by: NURSE PRACTITIONER

## 2019-03-18 RX ORDER — PANTOPRAZOLE SODIUM 40 MG/1
40 TABLET, DELAYED RELEASE ORAL
Qty: 30 TABLET | Refills: 0 | DISCHARGE
Start: 2019-03-19

## 2019-03-18 RX ORDER — AMOXICILLIN 250 MG
1 CAPSULE ORAL 2 TIMES DAILY PRN
Qty: 60 TABLET | Refills: 0 | DISCHARGE
Start: 2019-03-18

## 2019-03-18 RX ORDER — TRAMADOL HYDROCHLORIDE 50 MG/1
50 TABLET ORAL EVERY 6 HOURS PRN
Qty: 50 TABLET | Refills: 0 | Status: SHIPPED | DISCHARGE
Start: 2019-03-18 | End: 2019-03-27

## 2019-03-18 RX ORDER — ROSUVASTATIN CALCIUM 20 MG/1
20 TABLET, COATED ORAL DAILY
Qty: 30 TABLET | Refills: 3 | DISCHARGE
Start: 2019-03-19

## 2019-03-18 RX ORDER — METHOCARBAMOL 750 MG/1
750 TABLET, FILM COATED ORAL 4 TIMES DAILY PRN
Qty: 120 TABLET | Refills: 1 | DISCHARGE
Start: 2019-03-18

## 2019-03-18 RX ORDER — GABAPENTIN 300 MG/1
300 CAPSULE ORAL 2 TIMES DAILY
Qty: 14 CAPSULE | Refills: 0 | DISCHARGE
Start: 2019-03-18 | End: 2019-03-26

## 2019-03-18 RX ORDER — POLYETHYLENE GLYCOL 3350 17 G/17G
17 POWDER, FOR SOLUTION ORAL 2 TIMES DAILY
Qty: 30 PACKET | Refills: 1 | DISCHARGE
Start: 2019-03-18 | End: 2019-03-30 | Stop reason: DRUGHIGH

## 2019-03-18 RX ADMIN — PANTOPRAZOLE SODIUM 40 MG: 40 TABLET, DELAYED RELEASE ORAL at 06:39

## 2019-03-18 RX ADMIN — HEPARIN SODIUM 5000 UNITS: 5000 INJECTION, SOLUTION INTRAVENOUS; SUBCUTANEOUS at 00:53

## 2019-03-18 RX ADMIN — HEPARIN SODIUM 5000 UNITS: 5000 INJECTION, SOLUTION INTRAVENOUS; SUBCUTANEOUS at 08:46

## 2019-03-18 RX ADMIN — ROSUVASTATIN CALCIUM 20 MG: 20 TABLET, FILM COATED ORAL at 08:46

## 2019-03-18 RX ADMIN — TRIAMTERENE AND HYDROCHLOROTHIAZIDE 1 TABLET: 75; 50 TABLET ORAL at 08:46

## 2019-03-18 RX ADMIN — LEVOTHYROXINE SODIUM 50 MCG: 50 TABLET ORAL at 08:46

## 2019-03-18 RX ADMIN — ACETAMINOPHEN 975 MG: 325 TABLET, FILM COATED ORAL at 02:16

## 2019-03-18 RX ADMIN — FUROSEMIDE 20 MG: 10 INJECTION, SOLUTION INTRAVENOUS at 06:38

## 2019-03-18 RX ADMIN — ACETAMINOPHEN 975 MG: 325 TABLET, FILM COATED ORAL at 08:46

## 2019-03-18 RX ADMIN — POTASSIUM CHLORIDE 20 MEQ: 1500 TABLET, EXTENDED RELEASE ORAL at 09:12

## 2019-03-18 RX ADMIN — AMLODIPINE BESYLATE 5 MG: 5 TABLET ORAL at 08:46

## 2019-03-18 RX ADMIN — LIDOCAINE 1 PATCH: 560 PATCH PERCUTANEOUS; TOPICAL; TRANSDERMAL at 08:46

## 2019-03-18 RX ADMIN — GABAPENTIN 300 MG: 300 CAPSULE ORAL at 08:46

## 2019-03-18 RX ADMIN — ASPIRIN 325 MG: 325 TABLET, DELAYED RELEASE ORAL at 08:46

## 2019-03-18 RX ADMIN — LOSARTAN POTASSIUM 25 MG: 25 TABLET ORAL at 08:46

## 2019-03-18 ASSESSMENT — ACTIVITIES OF DAILY LIVING (ADL)
ADLS_ACUITY_SCORE: 15
ADLS_ACUITY_SCORE: 13
ADLS_ACUITY_SCORE: 15
ADLS_ACUITY_SCORE: 15

## 2019-03-18 ASSESSMENT — MIFFLIN-ST. JEOR: SCORE: 2413.63

## 2019-03-18 NOTE — PLAN OF CARE
Discharge Planner PT   Patient plan for discharge:TCU  Current status: Independent with sit<>stand. TM ambulation x 16 minutes up to 1.4 mph. Max . Stable pre/post VS. Normal CV response. Progressing well with activity goals.  Barriers to return to prior living situation: None, physically, does live alone.  Recommendations for discharge: Home with OP CR phase II.   Rationale for recommendations: Pt will benefit from continued skilled rehab services to progress exercise and activity tolerance as well as continue education for optimal heart health.         Entered by: Becca Ca 03/18/2019 12:29 PM

## 2019-03-18 NOTE — PROGRESS NOTES
"Facility called floor because they did not receive discharge orders. Reprinted orders for [facility]UC to fax. Pt sister called hospital about pain control at U at 1715. Claiming no meds have been given. Pt sister asked what to do with uncontrolled pain and writer responded\" I cannot tell you what is best for his pain.\" Pt sister was not happy with answer.  "

## 2019-03-18 NOTE — PLAN OF CARE
Pt is progressing well per plan of care, AVSS, sinus rhythm, on room air lungs clear. Incision CDI. Mild edema giulia LE's. Diuresing well. Had a BM. Pain control with tylenol and tramadol. Plan is to discontinue to tcu; pending. Up with 1 assist but steady on feet after.

## 2019-03-18 NOTE — PROGRESS NOTES
SW:    D: Following up on placement. Per report pt is stable for discharge today, waiting on insurance auth. SW left message for [facility] admissions following up on auth.  P: Continue to follow for discharge & placement needs.     JOE Black    ADDENDUM    Phone call with Tressa at [facility]. Insurance auth received. Patient to discharge today @ 1300 via transport aid to [facility]. PAS to be completed & orders to be faxed once complete.

## 2019-03-18 NOTE — PLAN OF CARE
Pt a/o x 4. VSS. Tele was NSR. Pain well controlled with scheduled tylenol. CMS intact, surgical siterd CDI. Lidocaine patch in place. Pt set to discharge to TCU. All belongings sent with pt. Pt w/c ride down to TCU.

## 2019-03-18 NOTE — PROGRESS NOTES
PAS-RR    D: Per [employer] regulation, SW completed and submitted PAS-RR to [place] [employer] via the [employer] Line.  PAS-RR confirmation # is : [id number]      I: SW spoke with pt and they are aware a PAS-RR has been submitted.  SW reviewed with pt that they may be contacted for a follow up appointment within 10 days of hospital discharge if their SNF stay is < 30 days.  Contact information for [facility] LinkAge Line was also provided.    A:pt  verbalized understanding.    P: Further questions may be directed to [facility] Link[employer] [facility] at #[phone], option #4 for PAS-RR staff.    SENAIT sent completed orders to [facility] via [employer].     JOE Black

## 2019-03-18 NOTE — DISCHARGE INSTRUCTIONS
Discharge Instructions following Arterial Bypass Surgery  [facility]    The surgeon who performed your surgery is: _____________________________________  Incision Care    The length and number of incisions you have will vary depending on the exact type of arterial bypass you require.  You may have incisions on your  good  leg as well, if that happens to be the leg with the  best vein  for the bypass graft.      You may have staples holding your incisions closed, and possibly sutures in other incisions, especially if near the ankle or foot.  These will be removed at your 2 week post-operative appointment.  Once they are removed, you may have white strips of tape, called steri-strips, applied over the incision.  These will curl up on the ends after 5-7 days, at which time they can be removed.      Leg swelling is common after surgery and must be controlled by elevating your legs above your heart.  Propping your legs up on a stool or sitting in a recliner chair will not relieve the swelling, but it will prevent it from getting worse.  Avoid hanging your leg in a dependant position.  For the first few weeks after surgery, you will need to really work at controlling this swelling, and will likely need to spend a fair amount of time with your leg elevated.  Eventually, you should be able to manage the swelling by elevating 3-4 times a day for 20-30 minutes.  If the swelling does not decrease after elevating your legs above your heart for at least 2 hours, you should call our office.      If you have a groin incision, you should keep gauze tucked in the skin fold to prevent the skin on skin contact.  This will prevent a moist environment which hinders healing.  Call our office to discuss this further if this area looks red or has an odor.      If you need to use a dressing over your incision, avoid using tape on the surrounding skin.  It is best to wrap with roll gauze, such as  Kerlix.  Be careful not to wrap tightly, just enough to hold the gauze in place.       You may shower 2-3 days after your surgery - pat the incision dry to prevent irritation from moisture.  You do not need to cover with a bandage unless you have drainage.      You may develop a bruising and firmness near your incision.  This will soften and resolve over the next 1-3 weeks.  Call our office if it enlarges, becomes red, or painful.      On occasion a soft, fluid-filled bulge can develop near a surgical incision - this is called a seroma.  It will likely resolve on its own, but occasionally requires your doctor to drain it in clinic.  You should call our office if you have questions about this.      You may notice numbness around your incision.  This is due to nerve irritation from the surgery and will gradually improve over the next several weeks.      Activity    Walking is important after surgery.  You will begin walking before you leave the hospital, and then you should gradually increase your distance as tolerated.  You should be taking at least 3-4 short walks a day if leg swelling is not a problem.      You can climb stairs when you feel strong enough.      You should not drive for 1-2 weeks.  In addition, you can not be taking prescription strength pain medication and you must feel strong enough to drive safely.      You may return to work once you feel ready - this can be decided at your 2 week post-operative visit.      You should avoid strenuous activity, including running, biking, or weight-lifting until discussed at your post-operative appointment.  Diet    You may resume a regular diet before you leave the hospital.  You may find that it is best to try smaller, more frequent meals until your appetite returns.    Medications    You may be started on a blood thinner after surgery.  If you are taking Coumadin, you need to have your blood monitored regularly.      You may be given a prescription for pain  medication after surgery.  If you need to have this refilled, please call your pharmacy where you had it filled.  They will then call us for approval.  Please do not call after hours for a refill on pain medication.    Post-Operative Appointments    You will need to see your doctor in clinic 10-14 days after surgery.        You will then be monitored regularly with an Ultrasound test to assure that the bypass graft is functioning properly.  Typically for the first year, you will have this test and appointment with your doctor every 3-6 months.  Thereafter, the frequency varies, but is generally every 6-12 months.  We will notify you by mail when you are due for these appointments.    When to Call our Office    Temperature greater than 101.      When you are unable to feel a pulse in your foot or leg, when you have been monitoring regularly.      If you notice new onset of numbness, tingling, coolness or pain in your leg or foot.      Severe pain, especially if it is new and preventing sleep.      If you will be having an invasive procedure, such as a colonoscopy or dental work, within one year of your surgery, you may need to take an antibiotic prior to the procedure if you had an artificial graft placed with your surgery; please call us so we can assist you with this.      Call our main number, [phone], for assistance with any concerns or questions.     Revised 5/2014

## 2019-03-18 NOTE — DISCHARGE SUMMARY
"Discharge Summary    Edilberto Brooks MRN# [id number]   YOB: 1970 Age: 48 year old     Date of Admission:  3/6/2019  Date of Discharge:  3/18/2019  Admitting Physician:  Mike Rincon MD  Discharge Physician:  Don Leon, *  Discharging Service:  Cardiovascular and Thoracic Surgery  Preoperative Weight:  151.5 kg  Discharge Weight:  145.3 kg       Primary Provider: [facility], [facility]          Admission Diagnoses:   Hypokalemia [E87.6]  NSTEMI (non-ST elevated myocardial infarction) (H) [I21.4]          Discharge Diagnosis:   Patient Active Problem List   Diagnosis     NSTEMI (non-ST elevated myocardial infarction) (H)     CAD in native artery                Discharge Disposition:   Discharged to short-term care facility           Condition on Discharge:   Discharge condition: Good   Discharge vitals: Blood pressure 126/85, pulse 77, temperature 97.9  F (36.6  C), temperature source Oral, resp. rate 18, height 1.905 m (6' 3\"), weight 145.3 kg (320 lb 5.3 oz), SpO2 97 %.     Code status on discharge: Full Code           Procedures:   Coronary artery bypass grafting x4, LIMA-LAD, SVG-DIAG, SVG-OM, SVG-LPDA; endoscopic harvest left greater saphenous vein by Dr. Freddy Rincon.        Medications Prior to Admission:     Medications Prior to Admission   Medication Sig Dispense Refill Last Dose     acetaminophen (TYLENOL) 325 MG tablet Take 325-650 mg by mouth every 6 hours as needed for mild pain   prn     amLODIPine (NORVASC) 10 MG tablet Take 10 mg by mouth daily   3/5/2019 at pm     aspirin (ASA) 325 MG EC tablet Take 325 mg by mouth daily as needed for moderate pain    prn     levothyroxine (SYNTHROID/LEVOTHROID) 50 MCG tablet Take 50 mcg by mouth daily   Past Month at Unknown time     losartan (COZAAR) 25 MG tablet Take 25 mg by mouth daily   3/5/2019 at pm     triamterene-HCTZ (MAXZIDE) 75-50 MG tablet Take 1 tablet by mouth every morning    3/5/2019 at pm     [DISCONTINUED] ibuprofen " (ADVIL/MOTRIN) 200 MG tablet Take 200-400 mg by mouth every 4 hours as needed for mild pain   prn             Discharge Medications:     Current Discharge Medication List      START taking these medications    Details   gabapentin (NEURONTIN) 300 MG capsule Take 1 capsule (300 mg) by mouth 2 times daily for 7 days  Qty: 14 capsule, Refills: 0    Associated Diagnoses: S/P CABG (coronary artery bypass graft)      guaiFENesin (ROBITUSSIN) 20 mg/mL SOLN solution Take 10 mLs by mouth every 4 hours as needed for cough  Qty: 1000 mL, Refills: 0    Associated Diagnoses: S/P CABG (coronary artery bypass graft)      methocarbamol (ROBAXIN) 750 MG tablet Take 1 tablet (750 mg) by mouth 4 times daily as needed for muscle spasms  Qty: 120 tablet, Refills: 1    Associated Diagnoses: S/P CABG (coronary artery bypass graft)      pantoprazole (PROTONIX) 40 MG EC tablet Take 1 tablet (40 mg) by mouth every morning (before breakfast)  Qty: 30 tablet, Refills: 0    Associated Diagnoses: S/P CABG (coronary artery bypass graft)      polyethylene glycol (MIRALAX/GLYCOLAX) packet Take 17 g by mouth 2 times daily  Qty: 30 packet, Refills: 1    Associated Diagnoses: S/P CABG (coronary artery bypass graft)      rosuvastatin (CRESTOR) 20 MG tablet Take 1 tablet (20 mg) by mouth daily  Qty: 30 tablet, Refills: 3    Associated Diagnoses: S/P CABG (coronary artery bypass graft)      senna-docusate (SENOKOT-S/PERICOLACE) 8.6-50 MG tablet Take 1 tablet by mouth 2 times daily as needed for constipation  Qty: 60 tablet, Refills: 0    Associated Diagnoses: S/P CABG (coronary artery bypass graft)      traMADol (ULTRAM) 50 MG tablet Take 1 tablet (50 mg) by mouth every 6 hours as needed for moderate pain  Qty: 50 tablet, Refills: 0    Associated Diagnoses: S/P CABG (coronary artery bypass graft)         CONTINUE these medications which have NOT CHANGED    Details   acetaminophen (TYLENOL) 325 MG tablet Take 325-650 mg by mouth every 6 hours as needed  for mild pain      amLODIPine (NORVASC) 10 MG tablet Take 10 mg by mouth daily      aspirin (ASA) 325 MG EC tablet Take 325 mg by mouth daily as needed for moderate pain       levothyroxine (SYNTHROID/LEVOTHROID) 50 MCG tablet Take 50 mcg by mouth daily      losartan (COZAAR) 25 MG tablet Take 25 mg by mouth daily      triamterene-HCTZ (MAXZIDE) 75-50 MG tablet Take 1 tablet by mouth every morning          STOP taking these medications       amLODIPine (NORVASC) 1 mg/mL SUSP Comments:   Reason for Stopping:         ibuprofen (ADVIL/MOTRIN) 200 MG tablet Comments:   Reason for Stopping:         losartan (COZAAR) 2.5 mg/mL SUSP Comments:   Reason for Stopping:                     Consultations:   Cardiac Rehab  Intensivist             Brief History of Illness:   48 year old male admitted last week with chest pain and found to have elevated troponin. Cor angio revealed severe multivessel coronary disease. ECHO showed essentially preserved function. Following discussion of risks and benefits, he has elected to proceed with surgery.         Hospital Course:   On 3/12/2019, Mr. Brooks underwent successful coronary artery bypass grafting x4, LIMA-LAD, SVG-DIAG, SVG-OM, SVG-LPDA; endoscopic harvest left greater saphenous vein by Dr. Mike Rincon.    Intraoperative findings included a sternum of adequate quality. LIMA 2mm and good flow. Saphenous vein mostly 3-4mm and good quality. LPDA diffusely diseased/calcified. Grafted at best appearing site, still moderate disease, only 1.5mm, not a very good target. Non diseased distal LCx system/distal OM within 1.5cm of this vessel. OM 2mm and excellent target.  Diagonal moderate diffuse disease, grafted somewhat distally, minimal disease at this site, 1.75mm. LAD 2mm and good target.    Postoperatively, patient was taken to the intensive care unit for recovery in stable condition. Patient was extubated within protocol.  Blood pressure and cardiac index were managed with vasopressors  and inotropic agents which were continuously weaned until no longer needed.  Patient was subsequently  transferred to the surgical telemetry floor.    Patient does have asymptomatic bradycardia associated with sleep. Electrophysiology was consulted and it is thought that patient has undiagnosed sleep apnea. It is recommended that he follow up with an outpatient sleep study. Because of this, patient will not discharge on a beta blocker.    While on the surgical unit, the patient continued to progress well. Chest tubes and temporary pacemaker wires were removed when deemed appropriate.     Patient was fluid overloaded and treated with diuretics. He is 6.2 kg belowpreoperative weight and will not discharge diuretic therapy; will re-evaluate need in clinic follow-up.      Patient was transiently hyperglycemic and treated with insulin infusion then transitioned to sliding scale insulin per protocol. Blood sugars remained stable. No further glycemic control agents needed at this time.     On day of discharge, patient's pain was well controlled, was working well with therapies and stable for discharge to TCU/[place] on POD # 6. He has coronary artery disease and will discharge on ASA, statin. Statin is new for this patient so he will have a 2 month follow up lipid profile and LFTs. Follow up with cardiology and cardiac surgery have been arranged. Pt encouraged to follow up with PCP and cardiac rehab upon discharge.             Significant Results:   Lab Results   Component Value Date    WBC 10.2 03/15/2019     Lab Results   Component Value Date    RBC 3.39 03/15/2019     Lab Results   Component Value Date    HGB 10.4 03/15/2019     Lab Results   Component Value Date    HCT 31.2 03/15/2019     No components found for: MCT  Lab Results   Component Value Date    MCV 92 03/15/2019     Lab Results   Component Value Date    MCH 30.7 03/15/2019     Lab Results   Component Value Date    MCHC 33.3 03/15/2019     Lab Results    Component Value Date    RDW 13.7 03/15/2019     Lab Results   Component Value Date     03/16/2019       Last Basic Metabolic Panel:  Lab Results   Component Value Date     03/18/2019      Lab Results   Component Value Date    POTASSIUM 3.6 03/18/2019     Lab Results   Component Value Date    CHLORIDE 100 03/18/2019     Lab Results   Component Value Date    KAREN 8.7 03/18/2019     Lab Results   Component Value Date    CO2 31 03/18/2019     Lab Results   Component Value Date    BUN 20 03/18/2019     Lab Results   Component Value Date    CR 0.93 03/18/2019     Lab Results   Component Value Date    GLC 92 03/18/2019                    Pending Results:   None           Discharge Instructions and Follow-Up:   Discharge diet: Regular   Discharge activity: Daily weights: Call if weight gain 2-3 lbs over 24 hours or if greater than 5 lbs in 1 week.  No lifting more than 10 lbs for 8-12 weeks.  No driving for 1 month.  Call for pain medication refill.  Call for temperature greater than 101.0  Daily shower with antibacterial soap.   Discharge follow-up: *Follow up primary care provider in 7-10 days after discharge in order to review your medication, vital signs, obtain any necessary lab work your doctor may want, and to update them on your hospitalization and medical issues.   *Follow up with CV surgery Lee (Rachel Fernandez or Roxi) on 3/27/2019 at 1:30 pm at [facility] at [facility] [address]. If any questions or concerns call [phone].  You will see us once at this visit and then if everything is going well you will not need to see us again.  You will follow long term with your cardiologist.   *Follow up with Dr. Hughes, cardiologist, on 4/22/2019 at 12:45. This is who you will follow with long term about your heart issues. [phone].   *For [place] outpatient cardiac rehab, call [phone].    Outpatient therapy: Cardiac Rehab   Home Care agency: None    Supplies and equipment: None    Lines and drains: None    Wound care: Wash incision daily with antibacterial soap  Pat dry  No lotions, oils or creams to incision site   Other instructions: None       AMA Charles, CCRN-CSC  Pager: [phone]

## 2019-03-18 NOTE — PLAN OF CARE
Cardiac Rehab Discharge Summary    Reason for therapy discharge:    Discharged to transitional care facility.    Progress towards therapy goal(s). See goals on Care Plan in [facility] electronic health record for goal details.  Goals not met.  Barriers to achieving goals:   discharge from facility.    Therapy recommendation(s):    Continued therapy is recommended.  Rationale/Recommendations:  continue cardiac rehab to further progress aerobic activity tolerance and education to promote heart healthy lifestyle.

## 2019-03-18 NOTE — PROGRESS NOTES
CV Surgery Progress Note     Assessment & Plan:   1. POD # 6 s/p Coronary artery bypass grafting x4, LIMA-LAD, SVG-DIAG, SVG-OM, SVG-LPDA; endoscopic harvest left greater saphenous vein by Dr. Freddy Rincon.  2. Bradycardia, stable - EP consulted for pauses during sleep. Likely LG with recommended outpatient sleep study. Will not discharge on beta blocker.  3. HTN - on PTA triamterene and amlodipine, losartan  4. Hypothyroidism on PTA levothyroxine    Will discharge to TCU/[place] today. Patient agreeable to plan.    Subjective: States pain is well managed, is working well with therapies, is able to perform IS to 3000. Slept well overnight, good appetite. Denies sternal popping/clicking.      Objective: B/P: 126/85, T: 97.9, P: 77, R: 18  General: NAD  Heart: RRR, S1, S2, no m/r/g  Lungs: CTA  Abd: S/NT/ND, +BS  Sternum: Midline sternal incision CDI, LLE incision CDI  Extremities: 1+ BLE edema    Lab Results   Component Value Date    WBC 10.2 03/15/2019     Lab Results   Component Value Date    RBC 3.39 03/15/2019     Lab Results   Component Value Date    HGB 10.4 03/15/2019     Lab Results   Component Value Date    HCT 31.2 03/15/2019     No components found for: MCT  Lab Results   Component Value Date    MCV 92 03/15/2019     Lab Results   Component Value Date    MCH 30.7 03/15/2019     Lab Results   Component Value Date    MCHC 33.3 03/15/2019     Lab Results   Component Value Date    RDW 13.7 03/15/2019     Lab Results   Component Value Date     03/16/2019       Last Basic Metabolic Panel:  Lab Results   Component Value Date     03/18/2019      Lab Results   Component Value Date    POTASSIUM 3.6 03/18/2019     Lab Results   Component Value Date    CHLORIDE 100 03/18/2019     Lab Results   Component Value Date    KAREN 8.7 03/18/2019     Lab Results   Component Value Date    CO2 31 03/18/2019     Lab Results   Component Value Date    BUN 20 03/18/2019     Lab Results   Component Value Date    CR 0.93  03/18/2019     Lab Results   Component Value Date    GLC 92 03/18/2019       AMA Charles, CCRN-[facility]  Pager: [phone]

## 2019-03-18 NOTE — PROGRESS NOTES
This writer went over discharge tool and instructions post hospital.  All questions answered and patient verbalized understanding.

## 2019-03-19 ENCOUNTER — NURSING HOME VISIT (OUTPATIENT)
Dept: GERIATRICS | Facility: CLINIC | Age: 49
End: 2019-03-19
Payer: COMMERCIAL

## 2019-03-19 VITALS
TEMPERATURE: 97.7 F | DIASTOLIC BLOOD PRESSURE: 63 MMHG | HEART RATE: 76 BPM | BODY MASS INDEX: 39.17 KG/M2 | RESPIRATION RATE: 16 BRPM | HEIGHT: 75 IN | WEIGHT: 315 LBS | OXYGEN SATURATION: 93 % | SYSTOLIC BLOOD PRESSURE: 109 MMHG

## 2019-03-19 DIAGNOSIS — I25.10 CORONARY ARTERY DISEASE INVOLVING NATIVE HEART WITHOUT ANGINA PECTORIS, UNSPECIFIED VESSEL OR LESION TYPE: Primary | ICD-10-CM

## 2019-03-19 DIAGNOSIS — E66.01 MORBID OBESITY (H): ICD-10-CM

## 2019-03-19 DIAGNOSIS — I21.4 NSTEMI (NON-ST ELEVATED MYOCARDIAL INFARCTION) (H): ICD-10-CM

## 2019-03-19 DIAGNOSIS — R53.81 PHYSICAL DECONDITIONING: ICD-10-CM

## 2019-03-19 DIAGNOSIS — Z95.1 S/P CABG (CORONARY ARTERY BYPASS GRAFT): ICD-10-CM

## 2019-03-19 DIAGNOSIS — Z71.89 ADVANCED DIRECTIVES, COUNSELING/DISCUSSION: ICD-10-CM

## 2019-03-19 DIAGNOSIS — I10 ESSENTIAL HYPERTENSION: ICD-10-CM

## 2019-03-19 DIAGNOSIS — D62 ANEMIA DUE TO BLOOD LOSS, ACUTE: ICD-10-CM

## 2019-03-19 DIAGNOSIS — M10.9 ACUTE GOUT OF RIGHT ANKLE, UNSPECIFIED CAUSE: ICD-10-CM

## 2019-03-19 PROCEDURE — 99309 SBSQ NF CARE MODERATE MDM 30: CPT | Performed by: NURSE PRACTITIONER

## 2019-03-19 ASSESSMENT — MIFFLIN-ST. JEOR: SCORE: 2404.88

## 2019-03-19 NOTE — PROGRESS NOTES
[place] [facility]  PRIMARY CARE PROVIDER AND CLINIC:  [facility], [address] / [facility] [address]  Chief Complaint   Patient presents with     Hospital F/U     [place] Medical Record Number:  [id number]  Place of Service where encounter took place:  [facility] [[id number]]    Edilberto Brooks  is a 48 year old  (1970), admitted to the above facility from  [facility]. Hospital stay 3/6/19 through 3/18/19. Admitted to this facility for  rehab, medical management and nursing care.    HPI:    HPI information obtained from: facility chart records, facility staff, patient report and [facility] chart review.   Brief Summary of Hospital Course:  He has a medical history significant for morbid obesity and  HTN and was hospitalized after presenting to the ED with chest pain. He had elevated troponin and angiogram showed severe multivessel coronary disease. He underwent CABG X 4 by Dr Rincon on 3/12/2019. Tolerated the procedure well. He was diuresed. Statin was started. Beta blocker was not started due to asymptomatic bradycardia with sleep. EP consulted and felt he has undiagnosed sleep apnea. Outpatient sleep study was recommended. Ortho consulted for gout of right ankle, treated with prednisone burst and colchicine.     Updates on Status Since Skilled nursing Admission:      Coronary artery disease involving native heart without angina pectoris, unspecified vessel or lesion type  NSTEMI (non-ST elevated myocardial infarction) (H)  S/P CABG (coronary artery bypass graft)-reports severe surgical pain. Tramadol wasn't available upon tcu arrival last evening and he's upset about getting behind on pain control. Reports relief with tylenol. Was using lidocaine patches inpatient with good results. Occasional dry cough. No shortness of breath or chest pain. Using IS. Reports LE edema has improved.   Morbid obesity (H)  Essential hypertension-BPs: 109/63, 117/75,  122/77  HR: 76-82  Acute gout of right ankle, unspecified cause-reports pain and edema have resolved. No other areas of joint pain.   Anemia due to blood loss, acute  Physical deconditioning-ambulating with supervision. Requires stand by to min assist with cares.       CODE STATUS/ADVANCE DIRECTIVES DISCUSSION:   CPR/Full code   Patient's living condition: lives alone He's an .   ALLERGIES: Codeine  PAST MEDICAL HISTORY:  has a past medical history of CAD (coronary artery disease), Hypercholesteremia, Hypertension, Obesity, and Sinus node dysfunction (H).  PAST SURGICAL HISTORY:   has a past surgical history that includes fracture surgery (1997); Heart Catheterization with Possible Intervention (N/A, 3/7/2019); and Bypass graft artery coronary (N/A, 3/12/2019).  FAMILY HISTORY: family history includes Breast Cancer in his mother; Colon Cancer (age of onset: 70) in his father; Heart Disease in his mother; No Known Problems in his sister; Pacemaker in his father.  SOCIAL HISTORY:   reports that he has been smoking.  He has a 8.50 pack-year smoking history. he has never used smokeless tobacco. He reports that he drinks alcohol. He reports that he does not use drugs.    Post Discharge Medication Reconciliation Status: discharge medications reconciled and changed, per note/orders (see AVS)    Current Outpatient Medications   Medication Sig Dispense Refill     acetaminophen (TYLENOL) 325 MG tablet Take 975 mg by mouth every 6 hours as needed for mild pain        amLODIPine (NORVASC) 10 MG tablet Take 10 mg by mouth daily       aspirin (ASA) 325 MG EC tablet Take 325 mg by mouth daily as needed for moderate pain        gabapentin (NEURONTIN) 300 MG capsule Take 1 capsule (300 mg) by mouth 2 times daily for 7 days 14 capsule 0     guaiFENesin (ROBITUSSIN) 20 mg/mL SOLN solution Take 10 mLs by mouth every 4 hours as needed for cough 1000 mL 0     levothyroxine (SYNTHROID/LEVOTHROID) 50 MCG tablet Take 50 mcg by mouth  "daily       losartan (COZAAR) 25 MG tablet Take 25 mg by mouth daily       methocarbamol (ROBAXIN) 750 MG tablet Take 1 tablet (750 mg) by mouth 4 times daily as needed for muscle spasms 120 tablet 1     pantoprazole (PROTONIX) 40 MG EC tablet Take 1 tablet (40 mg) by mouth every morning (before breakfast) 30 tablet 0     polyethylene glycol (MIRALAX/GLYCOLAX) packet Take 17 g by mouth 2 times daily 30 packet 1     rosuvastatin (CRESTOR) 20 MG tablet Take 1 tablet (20 mg) by mouth daily 30 tablet 3     senna-docusate (SENOKOT-S/PERICOLACE) 8.6-50 MG tablet Take 1 tablet by mouth 2 times daily as needed for constipation 60 tablet 0     traMADol (ULTRAM) 50 MG tablet Take 1 tablet (50 mg) by mouth every 6 hours as needed for moderate pain 50 tablet 0     triamterene-HCTZ (MAXZIDE) 75-50 MG tablet Take 1 tablet by mouth every morning        ROS:  10 point ROS of systems including Constitutional, Eyes, Respiratory, Cardiovascular, Gastroenterology, Genitourinary, Integumentary, Musculoskeletal, Psychiatric were all negative except for pertinent positives noted in my HPI.    Vitals:  /63   Pulse 76   Temp 97.7  F (36.5  C)   Resp 16   Ht 1.905 m (6' 3\")   Wt 144.4 kg (318 lb 6.4 oz)   SpO2 93%   BMI 39.80 kg/m    Exam:  GENERAL APPEARANCE:  Alert, in no distress  ENT:  Mouth and posterior oropharynx normal, moist mucous membranes, normal hearing acuity  EYES:  EOM normal, conjunctiva and lids normal, PERRL  NECK:  No adenopathy,masses or thyromegaly  RESP:  respiratory effort and palpation of chest normal, lungs clear to auscultation , no respiratory distress  CV:  Palpation and auscultation of heart done , regular rate and rhythm, no murmur, +2 pedal pulses, peripheral edema 1+ in both LE  ABDOMEN:  normal bowel sounds, soft, nontender, no hepatosplenomegaly or other masses  M/S:   gait steady. MART with good strength. No joint inflammation  SKIN:  sternal incision, chest tube sites and LLE incisions all " clean, dry, intact, no erythema. No rashes or open areas  PSYCH:  oriented X 3, normal insight, judgement and memory, affect and mood normal    Lab/Diagnostic data:  Lab Results   Component Value Date    WBC 10.2 03/15/2019     Lab Results   Component Value Date    RBC 3.39 03/15/2019     Lab Results   Component Value Date    HGB 10.4 03/15/2019     Lab Results   Component Value Date    HCT 31.2 03/15/2019     Lab Results   Component Value Date    MCV 92 03/15/2019     Lab Results   Component Value Date    MCH 30.7 03/15/2019     Lab Results   Component Value Date    MCHC 33.3 03/15/2019     Lab Results   Component Value Date    RDW 13.7 03/15/2019     Lab Results   Component Value Date     03/16/2019     Last Comprehensive Metabolic Panel:  Sodium   Date Value Ref Range Status   03/18/2019 136 133 - 144 mmol/L Final     Potassium   Date Value Ref Range Status   03/18/2019 3.6 3.4 - 5.3 mmol/L Final     Chloride   Date Value Ref Range Status   03/18/2019 100 94 - 109 mmol/L Final     Carbon Dioxide   Date Value Ref Range Status   03/18/2019 31 20 - 32 mmol/L Final     Anion Gap   Date Value Ref Range Status   03/18/2019 5 3 - 14 mmol/L Final     Glucose   Date Value Ref Range Status   03/18/2019 92 70 - 99 mg/dL Final     Urea Nitrogen   Date Value Ref Range Status   03/18/2019 20 7 - 30 mg/dL Final     Creatinine   Date Value Ref Range Status   03/18/2019 0.93 0.66 - 1.25 mg/dL Final     GFR Estimate   Date Value Ref Range Status   03/18/2019 >90 >60 mL/min/[1.73_m2] Final     Comment:     Non  GFR Calc  Starting 12/18/2018, serum creatinine based estimated GFR (eGFR) will be   calculated using the Chronic Kidney Disease Epidemiology Collaboration   (CKD-EPI) equation.       Calcium   Date Value Ref Range Status   03/18/2019 8.7 8.5 - 10.1 mg/dL Final       ASSESSMENT / PLAN:  (I25.10) Coronary artery disease involving native heart without angina pectoris, unspecified vessel or lesion type   (primary encounter diagnosis)  (I21.4) NSTEMI (non-ST elevated myocardial infarction) (H)  (Z95.1) S/P CABG (coronary artery bypass graft)  Comment: pain not optimally controlled. Incisions healing without signs of infection.   Plan: schedule tramadol and tylenol. Lidoderm patch one each side of sternal incision. Continue gabapentin X 7 days, methocarbamol. Continue statin, ASA. Compression stockings and elevate legs. Cleanse incisions daily. Sternal precautions. Follow up with Cardiothoracic Surgery 3/27/2019, [facility] 4/22/2019. Follow up with PCP following tcu stay for outpatient sleep study.     (E66.01) Morbid obesity (H)  Comment: likely to impact his rehab course and mobility  Plan: dietician to consult    (I10) Essential hypertension  Comment: controlled  Plan: continue amlodipine, losartan, triamterene-HCTZ. Monitor VS. BMP    (M10.9) Acute gout of right ankle, unspecified cause  Comment: appears resolved  Plan: monitor for recurrent symptoms     (D62) Anemia due to blood loss, acute  Comment: mild, no signs of active  bleeding  Plan: Hgb    (R53.81) Physical deconditioning  Comment: due to surgery and hospitalization  Plan: PHYSICAL THERAPY/OT. Goal is to return home with outpatient Cardiac Rehab.     (Z71.89) Advanced directives, counseling/discussion  Comment: he does not have a healthcare directive and requests Full Code  Plan: POLST completed        Total time spent with patient visit at the skilled nursing facility was 40 mins including patient visit and review of past records. Greater than 50% of total time spent with counseling and coordinating care due to complexity of care  Electronically signed by:  KEATON Haji CNP

## 2019-03-19 NOTE — LETTER
3/19/2019        RE: Edilberto Brooks  [address]  [place] [address]        [place] [facility]  PRIMARY CARE PROVIDER AND CLINIC:  [facility], [address] / [place] [address]  Chief Complaint   Patient presents with     Hospital F/U     [place] Medical Record Number:  [id number]  Place of Service where encounter took place:  [facility] ([facility]) [[id number]]    Edilberto Brooks  is a 48 year old  (1970), admitted to the above facility from  [facility]. Hospital stay 3/6/19 through 3/18/19. Admitted to this facility for  rehab, medical management and nursing care.    HPI:    HPI information obtained from: facility chart records, facility staff, patient report and [facility] chart review.   Brief Summary of Hospital Course:  He has a medical history significant for morbid obesity and  HTN and was hospitalized after presenting to the ED with chest pain. He had elevated troponin and angiogram showed severe multivessel coronary disease. He underwent CABG X 4 by Dr Rincon on 3/12/2019. Tolerated the procedure well. He was diuresed. Statin was started. Beta blocker was not started due to asymptomatic bradycardia with sleep. EP consulted and felt he has undiagnosed sleep apnea. Outpatient sleep study was recommended. Ortho consulted for gout of right ankle, treated with prednisone burst and colchicine.     Updates on Status Since Skilled nursing Admission:      Coronary artery disease involving native heart without angina pectoris, unspecified vessel or lesion type  NSTEMI (non-ST elevated myocardial infarction) (H)  S/P CABG (coronary artery bypass graft)-reports severe surgical pain. Tramadol wasn't available upon tcu arrival last evening and he's upset about getting behind on pain control. Reports relief with tylenol. Was using lidocaine patches inpatient with good results. Occasional dry cough. No shortness of breath or chest pain. Using IS. Reports LE  edema has improved.   Morbid obesity (H)  Essential hypertension-BPs: 109/63, 117/75, 122/77  HR: 76-82  Acute gout of right ankle, unspecified cause-reports pain and edema have resolved. No other areas of joint pain.   Anemia due to blood loss, acute  Physical deconditioning-ambulating with supervision. Requires stand by to min assist with cares.       CODE STATUS/ADVANCE DIRECTIVES DISCUSSION:   CPR/Full code   Patient's living condition: lives alone He's an .   ALLERGIES: Codeine  PAST MEDICAL HISTORY:  has a past medical history of CAD (coronary artery disease), Hypercholesteremia, Hypertension, Obesity, and Sinus node dysfunction (H).  PAST SURGICAL HISTORY:   has a past surgical history that includes fracture surgery (1997); Heart Catheterization with Possible Intervention (N/A, 3/7/2019); and Bypass graft artery coronary (N/A, 3/12/2019).  FAMILY HISTORY: family history includes Breast Cancer in his mother; Colon Cancer (age of onset: 70) in his father; Heart Disease in his mother; No Known Problems in his sister; Pacemaker in his father.  SOCIAL HISTORY:   reports that he has been smoking.  He has a 8.50 pack-year smoking history. he has never used smokeless tobacco. He reports that he drinks alcohol. He reports that he does not use drugs.    Post Discharge Medication Reconciliation Status: discharge medications reconciled and changed, per note/orders (see AVS)    Current Outpatient Medications   Medication Sig Dispense Refill     acetaminophen (TYLENOL) 325 MG tablet Take 975 mg by mouth every 6 hours as needed for mild pain        amLODIPine (NORVASC) 10 MG tablet Take 10 mg by mouth daily       aspirin (ASA) 325 MG EC tablet Take 325 mg by mouth daily as needed for moderate pain        gabapentin (NEURONTIN) 300 MG capsule Take 1 capsule (300 mg) by mouth 2 times daily for 7 days 14 capsule 0     guaiFENesin (ROBITUSSIN) 20 mg/mL SOLN solution Take 10 mLs by mouth every 4 hours as needed for cough  "1000 mL 0     levothyroxine (SYNTHROID/LEVOTHROID) 50 MCG tablet Take 50 mcg by mouth daily       losartan (COZAAR) 25 MG tablet Take 25 mg by mouth daily       methocarbamol (ROBAXIN) 750 MG tablet Take 1 tablet (750 mg) by mouth 4 times daily as needed for muscle spasms 120 tablet 1     pantoprazole (PROTONIX) 40 MG EC tablet Take 1 tablet (40 mg) by mouth every morning (before breakfast) 30 tablet 0     polyethylene glycol (MIRALAX/GLYCOLAX) packet Take 17 g by mouth 2 times daily 30 packet 1     rosuvastatin (CRESTOR) 20 MG tablet Take 1 tablet (20 mg) by mouth daily 30 tablet 3     senna-docusate (SENOKOT-S/PERICOLACE) 8.6-50 MG tablet Take 1 tablet by mouth 2 times daily as needed for constipation 60 tablet 0     traMADol (ULTRAM) 50 MG tablet Take 1 tablet (50 mg) by mouth every 6 hours as needed for moderate pain 50 tablet 0     triamterene-HCTZ (MAXZIDE) 75-50 MG tablet Take 1 tablet by mouth every morning        ROS:  10 point ROS of systems including Constitutional, Eyes, Respiratory, Cardiovascular, Gastroenterology, Genitourinary, Integumentary, Musculoskeletal, Psychiatric were all negative except for pertinent positives noted in my HPI.    Vitals:  /63   Pulse 76   Temp 97.7  F (36.5  C)   Resp 16   Ht 1.905 m (6' 3\")   Wt 144.4 kg (318 lb 6.4 oz)   SpO2 93%   BMI 39.80 kg/m     Exam:  GENERAL APPEARANCE:  Alert, in no distress  ENT:  Mouth and posterior oropharynx normal, moist mucous membranes, normal hearing acuity  EYES:  EOM normal, conjunctiva and lids normal, PERRL  NECK:  No adenopathy,masses or thyromegaly  RESP:  respiratory effort and palpation of chest normal, lungs clear to auscultation , no respiratory distress  CV:  Palpation and auscultation of heart done , regular rate and rhythm, no murmur, +2 pedal pulses, peripheral edema 1+ in both LE  ABDOMEN:  normal bowel sounds, soft, nontender, no hepatosplenomegaly or other masses  M/S:   gait steady. MART with good strength. No " joint inflammation  SKIN:  sternal incision, chest tube sites and LLE incisions all clean, dry, intact, no erythema. No rashes or open areas  PSYCH:  oriented X 3, normal insight, judgement and memory, affect and mood normal    Lab/Diagnostic data:  Lab Results   Component Value Date    WBC 10.2 03/15/2019     Lab Results   Component Value Date    RBC 3.39 03/15/2019     Lab Results   Component Value Date    HGB 10.4 03/15/2019     Lab Results   Component Value Date    HCT 31.2 03/15/2019     Lab Results   Component Value Date    MCV 92 03/15/2019     Lab Results   Component Value Date    MCH 30.7 03/15/2019     Lab Results   Component Value Date    MCHC 33.3 03/15/2019     Lab Results   Component Value Date    RDW 13.7 03/15/2019     Lab Results   Component Value Date     03/16/2019     Last Comprehensive Metabolic Panel:  Sodium   Date Value Ref Range Status   03/18/2019 136 133 - 144 mmol/L Final     Potassium   Date Value Ref Range Status   03/18/2019 3.6 3.4 - 5.3 mmol/L Final     Chloride   Date Value Ref Range Status   03/18/2019 100 94 - 109 mmol/L Final     Carbon Dioxide   Date Value Ref Range Status   03/18/2019 31 20 - 32 mmol/L Final     Anion Gap   Date Value Ref Range Status   03/18/2019 5 3 - 14 mmol/L Final     Glucose   Date Value Ref Range Status   03/18/2019 92 70 - 99 mg/dL Final     Urea Nitrogen   Date Value Ref Range Status   03/18/2019 20 7 - 30 mg/dL Final     Creatinine   Date Value Ref Range Status   03/18/2019 0.93 0.66 - 1.25 mg/dL Final     GFR Estimate   Date Value Ref Range Status   03/18/2019 >90 >60 mL/min/[1.73_m2] Final     Comment:     Non  GFR Calc  Starting 12/18/2018, serum creatinine based estimated GFR (eGFR) will be   calculated using the Chronic Kidney Disease Epidemiology Collaboration   (CKD-EPI) equation.       Calcium   Date Value Ref Range Status   03/18/2019 8.7 8.5 - 10.1 mg/dL Final       ASSESSMENT / PLAN:  (I25.10) Coronary artery disease  involving native heart without angina pectoris, unspecified vessel or lesion type  (primary encounter diagnosis)  (I21.4) NSTEMI (non-ST elevated myocardial infarction) (H)  (Z95.1) S/P CABG (coronary artery bypass graft)  Comment: pain not optimally controlled. Incisions healing without signs of infection.   Plan: schedule tramadol and tylenol. Lidoderm patch one each side of sternal incision. Continue gabapentin X 7 days, methocarbamol. Continue statin, ASA. Compression stockings and elevate legs. Cleanse incisions daily. Sternal precautions. Follow up with Cardiothoracic Surgery 3/27/2019, [facility] 4/22/2019. Follow up with PCP following tcu stay for outpatient sleep study.     (E66.01) Morbid obesity (H)  Comment: likely to impact his rehab course and mobility  Plan: dietician to consult    (I10) Essential hypertension  Comment: controlled  Plan: continue amlodipine, losartan, triamterene-HCTZ. Monitor VS. BMP    (M10.9) Acute gout of right ankle, unspecified cause  Comment: appears resolved  Plan: monitor for recurrent symptoms     (D62) Anemia due to blood loss, acute  Comment: mild, no signs of active  bleeding  Plan: Hgb    (R53.81) Physical deconditioning  Comment: due to surgery and hospitalization  Plan: PHYSICAL THERAPY/OT. Goal is to return home with outpatient Cardiac Rehab.     (Z71.89) Advanced directives, counseling/discussion  Comment: he does not have a healthcare directive and requests Full Code  Plan: POLST completed        Total time spent with patient visit at the skilled nursing facility was 40 mins including patient visit and review of past records. Greater than 50% of total time spent with counseling and coordinating care due to complexity of care  Electronically signed by:  KEATON Haji CNP                       Sincerely,        KEATON Haji CNP

## 2019-03-20 ENCOUNTER — TELEPHONE (OUTPATIENT)
Dept: OTHER | Facility: CLINIC | Age: 49
End: 2019-03-20

## 2019-03-20 DIAGNOSIS — Z95.1 S/P CABG (CORONARY ARTERY BYPASS GRAFT): Primary | ICD-10-CM

## 2019-03-20 LAB
INTERPRETATION ECG - MUSE: NORMAL
INTERPRETATION ECG - MUSE: NORMAL

## 2019-03-20 NOTE — TELEPHONE ENCOUNTER
[facility]. Patient progressing well in rehab. Weight stable. Incision's clean dry et intact. Pain in better control. Plan discharge to home early next week. CV Surgery follow up 3/27.

## 2019-03-21 ENCOUNTER — HOSPITAL LABORATORY (OUTPATIENT)
Dept: OTHER | Facility: CLINIC | Age: 49
End: 2019-03-21

## 2019-03-21 VITALS
WEIGHT: 315 LBS | HEART RATE: 74 BPM | TEMPERATURE: 98.2 F | SYSTOLIC BLOOD PRESSURE: 111 MMHG | RESPIRATION RATE: 16 BRPM | OXYGEN SATURATION: 95 % | DIASTOLIC BLOOD PRESSURE: 72 MMHG | HEIGHT: 75 IN | BODY MASS INDEX: 39.17 KG/M2

## 2019-03-21 LAB
ANION GAP SERPL CALCULATED.3IONS-SCNC: 8 MMOL/L (ref 3–14)
BUN SERPL-MCNC: 19 MG/DL (ref 7–30)
CALCIUM SERPL-MCNC: 8.9 MG/DL (ref 8.5–10.1)
CHLORIDE SERPL-SCNC: 101 MMOL/L (ref 94–109)
CO2 SERPL-SCNC: 28 MMOL/L (ref 20–32)
CREAT SERPL-MCNC: 1.01 MG/DL (ref 0.66–1.25)
GFR SERPL CREATININE-BSD FRML MDRD: 88 ML/MIN/{1.73_M2}
GLUCOSE SERPL-MCNC: 100 MG/DL (ref 70–99)
HGB BLD-MCNC: 11.3 G/DL (ref 13.3–17.7)
POTASSIUM SERPL-SCNC: 3.7 MMOL/L (ref 3.4–5.3)
SODIUM SERPL-SCNC: 137 MMOL/L (ref 133–144)

## 2019-03-21 RX ORDER — ACETAMINOPHEN 500 MG
1000 TABLET ORAL 3 TIMES DAILY
COMMUNITY

## 2019-03-21 RX ORDER — LIDOCAINE 4 G/G
1 PATCH TOPICAL
COMMUNITY
End: 2019-03-30

## 2019-03-21 ASSESSMENT — MIFFLIN-ST. JEOR: SCORE: 2400.35

## 2019-03-22 ENCOUNTER — NURSING HOME VISIT (OUTPATIENT)
Dept: GERIATRICS | Facility: CLINIC | Age: 49
End: 2019-03-22
Payer: COMMERCIAL

## 2019-03-22 DIAGNOSIS — I21.4 NSTEMI (NON-ST ELEVATED MYOCARDIAL INFARCTION) (H): ICD-10-CM

## 2019-03-22 DIAGNOSIS — I10 ESSENTIAL HYPERTENSION: ICD-10-CM

## 2019-03-22 DIAGNOSIS — Z95.1 S/P CABG X 4: ICD-10-CM

## 2019-03-22 DIAGNOSIS — I25.10 CAD IN NATIVE ARTERY: ICD-10-CM

## 2019-03-22 DIAGNOSIS — R53.81 PHYSICAL DECONDITIONING: Primary | ICD-10-CM

## 2019-03-22 PROCEDURE — 99305 1ST NF CARE MODERATE MDM 35: CPT | Performed by: INTERNAL MEDICINE

## 2019-03-22 NOTE — LETTER
3/22/2019        RE: Edilberto Brooks  [address]  [place] [address]        PRIMARY CARE PROVIDER AND CLINIC RESPONSIBLE:  Nayely, Karen Vieyra, [address] [place] / [place] [address]        ADMISSION HISTORY AND PHYSICAL EXAMINATION     Chief Complaint   Patient presents with     Hospital F/U     Fatigue         HISTORY OF PRESENT ILLNESS:  48 year old male, (1970), [facility] for continuation of medical care and rehab.  HPI information obtained from: facility chart records, facility staff, patient report and [facility] chart review.    Edilberto Brooks is a 48 year old male with history of hypertension, hypothyroidism, psoriasis and obesity who was admitted at [facility] from 3/6 to 3/1/19 due to chest pain. He was found to have acute NSTEMI. Cardiac angiogram showed triple vessel CAD. He was seen by cardiologist and CV surgery. He underwent CABG x4 (LIMA-LAD, SVG-DIAG, SVG-OM, SVG-LPDA; endoscopic harvest left greater saphenous vein) on 3/121/9. Post-op course was significant sternal surgical and upper chest pain and asymptomatic bradycardia. He was suspected to have sleep disordered breathing and recommendeddec outpatient sleep study.He still has pain at shoulder muscles and sternal surgical site but denies dyspnea and fever. He lives alone. Slept well, appetite good and had BM. Patient denies chest pain, dyspnea, abdominal pain, n&v, fever, chills, dysuria, leg pain or swelling. The rest of ROS is unremarkable. Patient is seen and examined by Adrien Pabon NP. Please see ZANDRA Pabon's admit noted dated 3/19/19 for details of admission, past medical history, family history, allergies, medication list, social history and other details pertinent with this admission. Hospital admission and dc summary reviewed.    Past Medical History:   Diagnosis Date     CAD (coronary artery disease)     NSTEMI, s/p CABG 3/12/2019     Hypercholesteremia      Hypertension      Obesity       Sinus node dysfunction (H)        Past Surgical History:   Procedure Laterality Date     BYPASS GRAFT ARTERY CORONARY N/A 3/12/2019    Procedure: CORONARY ARTERY BYPASS GRAFTING x 4 (LIMA - LAD; SV - OM; SV - PDA; SV - DIAGONAL) WITH ENDOSCOPIC VEIN HARVEST ON THE LEFT LOWER EXTREMITY    (ON PUMP OXYGENATOR ; NANCY BY BRENNEN);  Surgeon: Mike Rincon MD;  Location:  OR     CV HEART CATHETERIZATION WITH POSSIBLE INTERVENTION N/A 3/7/2019    Procedure: Heart Catheterization with possible Intervention;  Surgeon: Drew Logan MD;  Location:  HEART CARDIAC CATH LAB     FRACTURE SURGERY  1997    left leg    /    Current Outpatient Medications   Medication Sig     acetaminophen (TYLENOL) 500 MG tablet Take 1,000 mg by mouth 3 times daily And Daily PRN     amLODIPine (NORVASC) 10 MG tablet Take 10 mg by mouth daily     aspirin (ASA) 325 MG EC tablet Take 325 mg by mouth daily as needed for moderate pain      gabapentin (NEURONTIN) 300 MG capsule Take 1 capsule (300 mg) by mouth 2 times daily for 7 days     guaiFENesin (ROBITUSSIN) 20 mg/mL SOLN solution Take 10 mLs by mouth every 4 hours as needed for cough     levothyroxine (SYNTHROID/LEVOTHROID) 50 MCG tablet Take 50 mcg by mouth daily     Lidocaine (LIDOCARE) 4 % Patch Place 1 patch onto the skin On in AM off in PM     losartan (COZAAR) 25 MG tablet Take 25 mg by mouth daily     methocarbamol (ROBAXIN) 750 MG tablet Take 1 tablet (750 mg) by mouth 4 times daily as needed for muscle spasms     pantoprazole (PROTONIX) 40 MG EC tablet Take 1 tablet (40 mg) by mouth every morning (before breakfast)     polyethylene glycol (MIRALAX/GLYCOLAX) packet Take 17 g by mouth 2 times daily     rosuvastatin (CRESTOR) 20 MG tablet Take 1 tablet (20 mg) by mouth daily     senna-docusate (SENOKOT-S/PERICOLACE) 8.6-50 MG tablet Take 1 tablet by mouth 2 times daily as needed for constipation     traMADol (ULTRAM) 50 MG tablet Take 1 tablet (50 mg) by mouth every 6 hours  as needed for moderate pain     triamterene-HCTZ (MAXZIDE) 75-50 MG tablet Take 1 tablet by mouth every morning      acetaminophen (TYLENOL) 325 MG tablet Take 975 mg by mouth every 6 hours as needed for mild pain      No current facility-administered medications for this visit.        Allergies   Allergen Reactions     Codeine      Hypotension/ Dizziness          Social History     Socioeconomic History     Marital status:      Spouse name: Not on file     Number of children: Not on file     Years of education: Not on file     Highest education level: Not on file   Occupational History     Occupation:    Social Needs     Financial resource strain: Not on file     Food insecurity:     Worry: Not on file     Inability: Not on file     Transportation needs:     Medical: Not on file     Non-medical: Not on file   Tobacco Use     Smoking status: Current Every Day Smoker     Packs/day: 0.50     Years: 17.00     Pack years: 8.50     Smokeless tobacco: Never Used   Substance and Sexual Activity     Alcohol use: Yes     Comment: social      Drug use: No     Sexual activity: Yes     Partners: Female   Lifestyle     Physical activity:     Days per week: Not on file     Minutes per session: Not on file     Stress: Not on file   Relationships     Social connections:     Talks on phone: Not on file     Gets together: Not on file     Attends Oriental orthodox service: Not on file     Active member of club or organization: Not on file     Attends meetings of clubs or organizations: Not on file     Relationship status: Not on file     Intimate partner violence:     Fear of current or ex partner: Not on file     Emotionally abused: Not on file     Physically abused: Not on file     Forced sexual activity: Not on file   Other Topics Concern     Not on file   Social History Narrative     Not on file          Information reviewed:  Medications, vital signs, orders, nursing notes, problem list, hospital information.     ROS: All  "10 point review of system completed, those pertinent positive, please see H&P, the remaining ROS is negative.    /72   Pulse 74   Temp 98.2  F (36.8  C)   Resp 16   Ht 1.905 m (6' 3\")   Wt 144 kg (317 lb 6.4 oz)   SpO2 95%   BMI 39.67 kg/m       PHYSICAL EXAMINATION:   GENERAL:  No acute distress.  SKIN:  Dry and warm.  There are psoriatic lesions at right leg, left elbow, scalp and genitals (not examined last one).   HEENT:  Head without trauma.  Pupils round, reactive. Exam of conjunctiva and lids are normal. Sclera without icterus. There is no oral thrush.  NECK:  Supple. No jugular venous distension.  CHEST: Mild sternal reproducible chest tenderness. Sternal wound healing.   LUNGS:  Normal respiratory effort. Lungs reveal decreased breath sounds at bases. No rales or wheezes.  HEART:  Regular rate and rhythm.  No murmur, gallops or rubs auscultated.  ABDOMEN:  Soft, bowel sounds positive.  There is no tenderness or guarding. Umbilical hernia, non  Tender.  EXTREMITIES: No edema. 2 graft wounds at left leg.  NEUROLOGIC:  Alert and oriented x3.  There is no focal deficit appreciated.  PSYCH: Recent and remote memory is normal. Mood and affect are normal.    Lab/Diagnostic data:  Reviewed    CBC RESULTS:   Recent Labs   Lab Test 03/21/19  0745 03/16/19  0851 03/15/19  0437   WBC  --   --  10.2   RBC  --   --  3.39*   HGB 11.3*  --  10.4*   HCT  --   --  31.2*   MCV  --   --  92   MCH  --   --  30.7   MCHC  --   --  33.3   RDW  --   --  13.7   PLT  --  327 228       Recent Labs   Lab Test 03/21/19  0745 03/18/19  0835    136   POTASSIUM 3.7 3.6   CHLORIDE 101 100   CO2 28 31   ANIONGAP 8 5   * 92   BUN 19 20   CR 1.01 0.93   KAREN 8.9 8.7       Liver Function Studies -   Recent Labs   Lab Test 03/13/19  0420   PROTTOTAL 5.7*   ALBUMIN 2.7*   BILITOTAL 0.4   ALKPHOS 68   AST 39   ALT 46         Results for orders placed or performed during the hospital encounter of 03/06/19   XR Chest 2 Views "    Narrative    CHEST TWO VIEWS  3/6/2019 9:31 PM     HISTORY: Chest pain    COMPARISON: None.      Impression    IMPRESSION: Normal.    SUSANA PEREZ MD   US Carotid Bilateral    Narrative    BILATERAL CAROTID ULTRASOUND   3/8/2019 10:08 AM     HISTORY:  Preoperative.    COMPARISON: None.    RIGHT CAROTID FINDINGS:  Shadowing plaque at the carotid bulb.  Right ICA PSV:  119  cm/sec.  Right ICA EDV:  27 cm/sec.  Right ICA/CCA PSV Ratio:  1.1    These indicate less than 50% diameter stenosis of the right ICA.    Right Vertebral: Antegrade flow.   Right ECA: Antegrade flow.     LEFT CAROTID FINDINGS:  Plaque at the common carotid and carotid bulb.  Left ICA PSV:  126  cm/sec.  Left ICA EDV:  21 cm/sec.  Left ICA/CCA PSV Ratio:  1.2    These indicate less than 50% diameter stenosis of the left ICA.    Left Vertebral: Antegrade flow.   Left ECA: Antegrade flow.     Causes of Decreased Accuracy:   None.       Impression    IMPRESSION:    1. Less than 50% diameter stenosis of the right ICA relative to the  distal ICA diameter.   2. Less than 50% diameter stenosis of the left ICA relative to the  distal ICA diameter.     ALICE JUÁREZ DO   US Lower Extremity Venous Mapping Bilateral    Narrative    ULTRASOUND LOWER EXTREMITY VENOUS MAPPING BILATERAL 3/8/2019 10:08 AM    DATE OF PROCEDURE: 3/8/2019 10:08 AM    CLINICAL HISTORY/INDICATION:  Preoperative.    FINDINGS/    Impression    IMPRESSION:   1. Right great saphenous vein in the thigh ranges from 4.6 mm to 5.8  mm.  2. Right great saphenous vein at and below the knee ranges from 2.7 mm  to 3.8 mm.  3. Left great saphenous vein in the thigh ranges from 2.8 mm to 5.6  mm.  4. Left great saphenous vein at and below the knee ranges from 3.2 mm  to 3.5 mm.    ALICE JUÁREZ DO   XR Ankle Right G/E 3 Views    Narrative    ANKLE RIGHT THREE OR MORE VIEWS  3/10/2019 1:11 PM     HISTORY: Ankle pain    COMPARISON: None.      Impression    IMPRESSION: No bony abnormality.  Mild soft tissue swelling medially.    TREMAINE THORNE MD   XR Chest Port 1 View    Narrative    XR CHEST PORT 1 VW  3/12/2019 5:27 PM     HISTORY:  post op CABG    COMPARISON: Chest x-ray dated 3/6/2019    FINDINGS:  Patient is status post a midline sternotomy. ET tube and NG  tube are in place. Mediastinal chest tubes are noted. Right jugular  venous catheter. No pneumothorax. The lungs are clear.      Impression    IMPRESSION: Postop cardiac surgery.    RIAZ WOODS MD   XR Chest Port 1 View    Narrative    CHEST ONE VIEW PORTABLE March 13, 2019 5:30 AM     HISTORY: Postoperative coronary artery bypass graft.     COMPARISON: 3/12/2019.      Impression    IMPRESSION: Endotracheal and nasogastric tube have been removed.  Anterior mediastinal drain and left basilar chest tube are unchanged.  There is a sheath in the right internal jugular vein. There is some  increased airspace opacity in the left lung that is improved compared  to prior. No pneumothorax on either side.    DIA CABRERA MD   XR Chest 2 Views    Narrative    CHEST TWO VIEWS   3/16/2019 8:05 AM     HISTORY: Postop CABG.    COMPARISON: 3/13/2019.      Impression    IMPRESSION:   1. Removal of right neck venous sheath. Removal of left base chest  tube. Very small left apex pneumothorax is newly suggested. Stable  sternotomy.   2. No acute cardiopulmonary disease. Mild bibasilar atelectasis.  Stable borderline prominent heart.    I communicated results to Taty, nursing support staff caring for  this patient on 3/16/2019 at 0815 hours.    PARMJIT SHAW MD           ASSESSMENT / PLAN:     Physical deconditioning  Plan: PT/OT with increase independence, self-care, strength and endurance and other cares that help return to home/facility of origin, fall precautions. Care conference with patient and family for the progress of rehab and disposition issues will be discussed as planned. Rehab evaluation and other evaluations including CPT are at rehab logs, to  be reviewed separately.  Fall risk assessment as well as cognitive evaluation will be formed during rehab stay if indicated.    CAD in native artery and NSTEMI (non-ST elevated myocardial infarction) (H) S/P CABG x 4  Plan: continue cardiac rehab, current medications including ASA, losartan and Crestor, monitor I&O and daily weighs and labs if indicated. Follow up cardiologist and PCP as scheduled. Post-op bradycardia, no beta-blockers and was recommended outpatient sleep study, leave as cards and PCP to order as outpatient. Continue pain control. We recommend weight loss through diet and exercise.    [facility] hypertension  Plan: Continue losartan and Maxzide with parameters. Blood pressure stable. We recommend life style modification with diet and exercises.    Other problems with same care. Primary care doctor and other specialists to address those chronic problems in next clinic appointment to be scheduled upon discharge from the TCU.      Total time spent with patient visit was 35 min including patient visit, review of past records, patients counseling and coordinating care.      Electronically signed by:  Per Veloz MD              Sincerely,        Per Veloz MD

## 2019-03-22 NOTE — PROGRESS NOTES
PRIMARY CARE PROVIDER AND CLINIC RESPONSIBLE:  Clinic, [facility], [address] / [facility] [address]        ADMISSION HISTORY AND PHYSICAL EXAMINATION     Chief Complaint   Patient presents with     Hospital F/U     Fatigue         HISTORY OF PRESENT ILLNESS:  48 year old male, (1970), [facility] of medical care and rehab.  HPI information obtained from: facility chart records, facility staff, patient report and [facility] chart review.    Edilberto Brooks is a 48 year old male with history of hypertension, hypothyroidism, psoriasis and obesity who was admitted at [facility] from 3/6 to 3/1/19 due to chest pain. He was found to have acute NSTEMI. Cardiac angiogram showed triple vessel CAD. He was seen by cardiologist and CV surgery. He underwent CABG x4 (LIMA-LAD, SVG-DIAG, SVG-OM, SVG-LPDA; endoscopic harvest left greater saphenous vein) on 3/121/9. Post-op course was significant sternal surgical and upper chest pain and asymptomatic bradycardia. He was suspected to have sleep disordered breathing and recommendeddec outpatient sleep study.He still has pain at shoulder muscles and sternal surgical site but denies dyspnea and fever. He lives alone. Slept well, appetite good and had BM. Patient denies chest pain, dyspnea, abdominal pain, n&v, fever, chills, dysuria, leg pain or swelling. The rest of ROS is unremarkable. Patient is seen and examined by Adrien Pabon NP. Please see ZANDRA Pabon's admit noted dated 3/19/19 for details of admission, past medical history, family history, allergies, medication list, social history and other details pertinent with this admission. Hospital admission and dc summary reviewed.    Past Medical History:   Diagnosis Date     CAD (coronary artery disease)     NSTEMI, s/p CABG 3/12/2019     Hypercholesteremia      Hypertension      Obesity      Sinus node dysfunction (H)        Past Surgical History:   Procedure Laterality Date      BYPASS GRAFT ARTERY CORONARY N/A 3/12/2019    Procedure: CORONARY ARTERY BYPASS GRAFTING x 4 (LIMA - LAD; SV - OM; SV - PDA; SV - DIAGONAL) WITH ENDOSCOPIC VEIN HARVEST ON THE LEFT LOWER EXTREMITY    (ON PUMP OXYGENATOR ; NANCY BY BRENNEN);  Surgeon: Mike Rincon MD;  Location:  OR     CV HEART CATHETERIZATION WITH POSSIBLE INTERVENTION N/A 3/7/2019    Procedure: Heart Catheterization with possible Intervention;  Surgeon: Drew Logan MD;  Location:  HEART CARDIAC CATH LAB     FRACTURE SURGERY  1997    left leg    /    Current Outpatient Medications   Medication Sig     acetaminophen (TYLENOL) 500 MG tablet Take 1,000 mg by mouth 3 times daily And Daily PRN     amLODIPine (NORVASC) 10 MG tablet Take 10 mg by mouth daily     aspirin (ASA) 325 MG EC tablet Take 325 mg by mouth daily as needed for moderate pain      gabapentin (NEURONTIN) 300 MG capsule Take 1 capsule (300 mg) by mouth 2 times daily for 7 days     guaiFENesin (ROBITUSSIN) 20 mg/mL SOLN solution Take 10 mLs by mouth every 4 hours as needed for cough     levothyroxine (SYNTHROID/LEVOTHROID) 50 MCG tablet Take 50 mcg by mouth daily     Lidocaine (LIDOCARE) 4 % Patch Place 1 patch onto the skin On in AM off in PM     losartan (COZAAR) 25 MG tablet Take 25 mg by mouth daily     methocarbamol (ROBAXIN) 750 MG tablet Take 1 tablet (750 mg) by mouth 4 times daily as needed for muscle spasms     pantoprazole (PROTONIX) 40 MG EC tablet Take 1 tablet (40 mg) by mouth every morning (before breakfast)     polyethylene glycol (MIRALAX/GLYCOLAX) packet Take 17 g by mouth 2 times daily     rosuvastatin (CRESTOR) 20 MG tablet Take 1 tablet (20 mg) by mouth daily     senna-docusate (SENOKOT-S/PERICOLACE) 8.6-50 MG tablet Take 1 tablet by mouth 2 times daily as needed for constipation     traMADol (ULTRAM) 50 MG tablet Take 1 tablet (50 mg) by mouth every 6 hours as needed for moderate pain     triamterene-HCTZ (MAXZIDE) 75-50 MG tablet Take 1  tablet by mouth every morning      acetaminophen (TYLENOL) 325 MG tablet Take 975 mg by mouth every 6 hours as needed for mild pain      No current facility-administered medications for this visit.        Allergies   Allergen Reactions     Codeine      Hypotension/ Dizziness          Social History     Socioeconomic History     Marital status:      Spouse name: Not on file     Number of children: Not on file     Years of education: Not on file     Highest education level: Not on file   Occupational History     Occupation:    Social Needs     Financial resource strain: Not on file     Food insecurity:     Worry: Not on file     Inability: Not on file     Transportation needs:     Medical: Not on file     Non-medical: Not on file   Tobacco Use     Smoking status: Current Every Day Smoker     Packs/day: 0.50     Years: 17.00     Pack years: 8.50     Smokeless tobacco: Never Used   Substance and Sexual Activity     Alcohol use: Yes     Comment: social      Drug use: No     Sexual activity: Yes     Partners: Female   Lifestyle     Physical activity:     Days per week: Not on file     Minutes per session: Not on file     Stress: Not on file   Relationships     Social connections:     Talks on phone: Not on file     Gets together: Not on file     Attends Baptism service: Not on file     Active member of club or organization: Not on file     Attends meetings of clubs or organizations: Not on file     Relationship status: Not on file     Intimate partner violence:     Fear of current or ex partner: Not on file     Emotionally abused: Not on file     Physically abused: Not on file     Forced sexual activity: Not on file   Other Topics Concern     Not on file   Social History Narrative     Not on file          Information reviewed:  Medications, vital signs, orders, nursing notes, problem list, hospital information.     ROS: All 10 point review of system completed, those pertinent positive, please see H&P,  "the remaining ROS is negative.    /72   Pulse 74   Temp 98.2  F (36.8  C)   Resp 16   Ht 1.905 m (6' 3\")   Wt 144 kg (317 lb 6.4 oz)   SpO2 95%   BMI 39.67 kg/m      PHYSICAL EXAMINATION:   GENERAL:  No acute distress.  SKIN:  Dry and warm.  There are psoriatic lesions at right leg, left elbow, scalp and genitals (not examined last one).   HEENT:  Head without trauma.  Pupils round, reactive. Exam of conjunctiva and lids are normal. Sclera without icterus. There is no oral thrush.  NECK:  Supple. No jugular venous distension.  CHEST: Mild sternal reproducible chest tenderness. Sternal wound healing.   LUNGS:  Normal respiratory effort. Lungs reveal decreased breath sounds at bases. No rales or wheezes.  HEART:  Regular rate and rhythm.  No murmur, gallops or rubs auscultated.  ABDOMEN:  Soft, bowel sounds positive.  There is no tenderness or guarding. Umbilical hernia, non  Tender.  EXTREMITIES: No edema. 2 graft wounds at left leg.  NEUROLOGIC:  Alert and oriented x3.  There is no focal deficit appreciated.  PSYCH: Recent and remote memory is normal. Mood and affect are normal.    Lab/Diagnostic data:  Reviewed    CBC RESULTS:   Recent Labs   Lab Test 03/21/19  0745 03/16/19  0851 03/15/19  0437   WBC  --   --  10.2   RBC  --   --  3.39*   HGB 11.3*  --  10.4*   HCT  --   --  31.2*   MCV  --   --  92   MCH  --   --  30.7   MCHC  --   --  33.3   RDW  --   --  13.7   PLT  --  327 228       Recent Labs   Lab Test 03/21/19  0745 03/18/19  0835    136   POTASSIUM 3.7 3.6   CHLORIDE 101 100   CO2 28 31   ANIONGAP 8 5   * 92   BUN 19 20   CR 1.01 0.93   KAREN 8.9 8.7       Liver Function Studies -   Recent Labs   Lab Test 03/13/19  0420   PROTTOTAL 5.7*   ALBUMIN 2.7*   BILITOTAL 0.4   ALKPHOS 68   AST 39   ALT 46         Results for orders placed or performed during the hospital encounter of 03/06/19   XR Chest 2 Views    Narrative    CHEST TWO VIEWS  3/6/2019 9:31 PM     HISTORY: Chest " pain    COMPARISON: None.      Impression    IMPRESSION: Normal.    SUSANA PEREZ MD   US Carotid Bilateral    Narrative    BILATERAL CAROTID ULTRASOUND   3/8/2019 10:08 AM     HISTORY:  Preoperative.    COMPARISON: None.    RIGHT CAROTID FINDINGS:  Shadowing plaque at the carotid bulb.  Right ICA PSV:  119  cm/sec.  Right ICA EDV:  27 cm/sec.  Right ICA/CCA PSV Ratio:  1.1    These indicate less than 50% diameter stenosis of the right ICA.    Right Vertebral: Antegrade flow.   Right ECA: Antegrade flow.     LEFT CAROTID FINDINGS:  Plaque at the common carotid and carotid bulb.  Left ICA PSV:  126  cm/sec.  Left ICA EDV:  21 cm/sec.  Left ICA/CCA PSV Ratio:  1.2    These indicate less than 50% diameter stenosis of the left ICA.    Left Vertebral: Antegrade flow.   Left ECA: Antegrade flow.     Causes of Decreased Accuracy:   None.       Impression    IMPRESSION:    1. Less than 50% diameter stenosis of the right ICA relative to the  distal ICA diameter.   2. Less than 50% diameter stenosis of the left ICA relative to the  distal ICA diameter.     ALICE JUÁREZ DO   US Lower Extremity Venous Mapping Bilateral    Narrative    ULTRASOUND LOWER EXTREMITY VENOUS MAPPING BILATERAL 3/8/2019 10:08 AM    DATE OF PROCEDURE: 3/8/2019 10:08 AM    CLINICAL HISTORY/INDICATION:  Preoperative.    FINDINGS/    Impression    IMPRESSION:   1. Right great saphenous vein in the thigh ranges from 4.6 mm to 5.8  mm.  2. Right great saphenous vein at and below the knee ranges from 2.7 mm  to 3.8 mm.  3. Left great saphenous vein in the thigh ranges from 2.8 mm to 5.6  mm.  4. Left great saphenous vein at and below the knee ranges from 3.2 mm  to 3.5 mm.    ALICE JUÁREZ DO   XR Ankle Right G/E 3 Views    Narrative    ANKLE RIGHT THREE OR MORE VIEWS  3/10/2019 1:11 PM     HISTORY: Ankle pain    COMPARISON: None.      Impression    IMPRESSION: No bony abnormality. Mild soft tissue swelling medially.    TREMAINE THORNE MD   XR Chest  Port 1 View    Narrative    XR CHEST PORT 1 VW  3/12/2019 5:27 PM     HISTORY:  post op CABG    COMPARISON: Chest x-ray dated 3/6/2019    FINDINGS:  Patient is status post a midline sternotomy. ET tube and NG  tube are in place. Mediastinal chest tubes are noted. Right jugular  venous catheter. No pneumothorax. The lungs are clear.      Impression    IMPRESSION: Postop cardiac surgery.    RIAZ WOODS MD   XR Chest Port 1 View    Narrative    CHEST ONE VIEW PORTABLE March 13, 2019 5:30 AM     HISTORY: Postoperative coronary artery bypass graft.     COMPARISON: 3/12/2019.      Impression    IMPRESSION: Endotracheal and nasogastric tube have been removed.  Anterior mediastinal drain and left basilar chest tube are unchanged.  There is a sheath in the right internal jugular vein. There is some  increased airspace opacity in the left lung that is improved compared  to prior. No pneumothorax on either side.    DIA CABRERA MD   XR Chest 2 Views    Narrative    CHEST TWO VIEWS   3/16/2019 8:05 AM     HISTORY: Postop CABG.    COMPARISON: 3/13/2019.      Impression    IMPRESSION:   1. Removal of right neck venous sheath. Removal of left base chest  tube. Very small left apex pneumothorax is newly suggested. Stable  sternotomy.   2. No acute cardiopulmonary disease. Mild bibasilar atelectasis.  Stable borderline prominent heart.    I communicated results to Taty, nursing support staff caring for  this patient on 3/16/2019 at 0815 hours.    PARMJIT SHAW MD           ASSESSMENT / PLAN:     Physical deconditioning  Plan: PT/OT with increase independence, self-care, strength and endurance and other cares that help return to home/facility of origin, fall precautions. Care conference with patient and family for the progress of rehab and disposition issues will be discussed as planned. Rehab evaluation and other evaluations including CPT are at rehab logs, to be reviewed separately.  Fall risk assessment as well as cognitive  evaluation will be formed during rehab stay if indicated.    CAD in native artery and NSTEMI (non-ST elevated myocardial infarction) (H) S/P CABG x 4  Plan: continue cardiac rehab, current medications including ASA, losartan and Crestor, monitor I&O and daily weighs and labs if indicated. Follow up cardiologist and PCP as scheduled. Post-op bradycardia, no beta-blockers and was recommended outpatient sleep study, leave as cards and PCP to order as outpatient. Continue pain control. We recommend weight loss through diet and exercise.    [facility]  Plan: Continue losartan and Maxzide with parameters. Blood pressure stable. We recommend life style modification with diet and exercises.    Other problems with same care. Primary care doctor and other specialists to address those chronic problems in next clinic appointment to be scheduled upon discharge from the TCU.      Total time spent with patient visit was 35 min including patient visit, review of past records, patients counseling and coordinating care.      Electronically signed by:  Per Veloz MD

## 2019-03-23 NOTE — ADDENDUM NOTE
Addendum  created 03/23/19 1341 by Nataliya Dowell MD    Intraprocedure Attestations deleted, Intraprocedure Attestations filed, Intraprocedure Blocks edited, Intraprocedure Event deleted, Intraprocedure Event edited, Intraprocedure Staff edited, Sign clinical note

## 2019-03-25 ENCOUNTER — NURSING HOME VISIT (OUTPATIENT)
Dept: GERIATRICS | Facility: CLINIC | Age: 49
End: 2019-03-25
Payer: COMMERCIAL

## 2019-03-25 VITALS
HEIGHT: 75 IN | RESPIRATION RATE: 18 BRPM | OXYGEN SATURATION: 97 % | HEART RATE: 78 BPM | WEIGHT: 218.4 LBS | SYSTOLIC BLOOD PRESSURE: 133 MMHG | BODY MASS INDEX: 27.15 KG/M2 | DIASTOLIC BLOOD PRESSURE: 82 MMHG | TEMPERATURE: 97.8 F

## 2019-03-25 DIAGNOSIS — E66.01 MORBID OBESITY (H): ICD-10-CM

## 2019-03-25 DIAGNOSIS — I25.2 HISTORY OF NON-ST ELEVATION MYOCARDIAL INFARCTION (NSTEMI): ICD-10-CM

## 2019-03-25 DIAGNOSIS — R60.0 BILATERAL LEG EDEMA: ICD-10-CM

## 2019-03-25 DIAGNOSIS — R53.81 PHYSICAL DECONDITIONING: ICD-10-CM

## 2019-03-25 DIAGNOSIS — D62 ANEMIA DUE TO BLOOD LOSS, ACUTE: ICD-10-CM

## 2019-03-25 DIAGNOSIS — I25.10 CORONARY ARTERY DISEASE INVOLVING NATIVE HEART WITHOUT ANGINA PECTORIS, UNSPECIFIED VESSEL OR LESION TYPE: Primary | ICD-10-CM

## 2019-03-25 DIAGNOSIS — I10 ESSENTIAL HYPERTENSION: ICD-10-CM

## 2019-03-25 DIAGNOSIS — Z95.1 S/P CABG X 4: ICD-10-CM

## 2019-03-25 PROCEDURE — 99309 SBSQ NF CARE MODERATE MDM 30: CPT | Performed by: NURSE PRACTITIONER

## 2019-03-25 RX ORDER — FUROSEMIDE 20 MG
20 TABLET ORAL DAILY
COMMUNITY
End: 2019-04-22

## 2019-03-25 ASSESSMENT — MIFFLIN-ST. JEOR: SCORE: 1951.29

## 2019-03-25 NOTE — LETTER
3/25/2019        RE: Edilberto Brooks  [address]  [facility] [address]        [facility]  [facility] Record Number:  [id number]  Place of Service where encounter took place:  [facility] [[id number]]  Chief Complaint   Patient presents with     RECHECK       HPI:    Edilberto Brooks  is a 48 year old (1970), who is being seen today for an episodic care visit.  HPI information obtained from: facility chart records, facility staff, patient report and [facility] chart review.   He came to this facility 3/18/2019 for short term rehab and medical management following hospitalization after presenting to the ED with chest pain. He had elevated troponin and angiogram showed severe multivessel coronary disease. He underwent CABG X 4 by Dr Rincon on 3/12/2019.  He was diuresed. Statin was started. Beta blocker was not started due to asymptomatic bradycardia with sleep. EP consulted and felt he has undiagnosed sleep apnea. Outpatient sleep study was recommended.      Today's concern is:     Coronary artery disease involving native heart without angina pectoris, unspecified vessel or lesion type-feeling well with improved strength. Reports surgical pain is slowly improving, relieved with tramadol and tylenol. Lidoderm patches are no longer adhering to his chest. Has noticed increased LE edema today. No calf pain. No cough, shortness of breath or chest pain. Weight is unchanged at 218 lbs. Wearing compression stockings.   S/P CABG x 4  History of non-ST elevation myocardial infarction (NSTEMI)  Bilateral leg edema  Morbid obesity (H)  Anemia due to blood loss, acute  Essential hypertension-BPs: 133/82, 108/70, 111/72, 124/83   HR: 68-82  Physical deconditioning-ambulating with supervision. Requires stand by assist with bathing.     Past Medical and Surgical History reviewed in Epic today.    MEDICATIONS:  Current Outpatient Medications   Medication Sig Dispense  "Refill     acetaminophen (TYLENOL) 500 MG tablet Take 1,000 mg by mouth 3 times daily And Daily PRN       amLODIPine (NORVASC) 10 MG tablet Take 10 mg by mouth daily       aspirin (ASA) 325 MG EC tablet Take 325 mg by mouth daily as needed for moderate pain        furosemide (LASIX) 20 MG tablet Take 20 mg by mouth daily 3/26, 3/27, 3/28/2019       guaiFENesin (ROBITUSSIN) 20 mg/mL SOLN solution Take 10 mLs by mouth every 4 hours as needed for cough 1000 mL 0     levothyroxine (SYNTHROID/LEVOTHROID) 50 MCG tablet Take 50 mcg by mouth daily       Lidocaine (LIDOCARE) 4 % Patch Place 1 patch onto the skin On in AM off in PM       losartan (COZAAR) 25 MG tablet Take 25 mg by mouth daily       methocarbamol (ROBAXIN) 750 MG tablet Take 1 tablet (750 mg) by mouth 4 times daily as needed for muscle spasms 120 tablet 1     pantoprazole (PROTONIX) 40 MG EC tablet Take 1 tablet (40 mg) by mouth every morning (before breakfast) 30 tablet 0     polyethylene glycol (MIRALAX/GLYCOLAX) packet Take 17 g by mouth 2 times daily 30 packet 1     potassium chloride ER (K-DUR/KLOR-CON M) 20 MEQ CR tablet Take 20 mEq by mouth daily 3/26, 3/27, 3/28/2019       rosuvastatin (CRESTOR) 20 MG tablet Take 1 tablet (20 mg) by mouth daily 30 tablet 3     senna-docusate (SENOKOT-S/PERICOLACE) 8.6-50 MG tablet Take 1 tablet by mouth 2 times daily as needed for constipation 60 tablet 0     traMADol (ULTRAM) 50 MG tablet Take 1 tablet (50 mg) by mouth every 6 hours as needed for moderate pain 50 tablet 0     triamterene-HCTZ (MAXZIDE) 75-50 MG tablet Take 1 tablet by mouth every morning HOLD 3/26, 3/27, 3/28/2019         REVIEW OF SYSTEMS:  4 point ROS including Respiratory, CV, GI and , other than that noted in the HPI,  is negative    Objective:  /82   Pulse 78   Temp 97.8  F (36.6  C)   Resp 18   Ht 1.905 m (6' 3\")   Wt 99.1 kg (218 lb 6.4 oz)   SpO2 97%   BMI 27.30 kg/m     Exam:  GENERAL APPEARANCE:  Alert, in no distress  ENT:  " Mouth and posterior oropharynx normal, moist mucous membranes, normal hearing acuity  EYES:  EOM normal, conjunctiva and lids normal  NECK:  No adenopathy,masses or thyromegaly  RESP:  respiratory effort and palpation of chest normal, lungs clear to auscultation , no respiratory distress  CV:  Palpation and auscultation of heart done , regular rate and rhythm, no murmur, +2 pedal pulses, peripheral edema 1 -2+ in both LE, no calf tenderness or erythema  ABDOMEN:   soft, nontender, no hepatosplenomegaly or other masses  M/S:   gait steady. MART with good strength. No joint inflammation  SKIN:  sternal incision, chest tube sites and LLE incisions all clean, dry, intact, no erythema. No rashes or open areas  PSYCH:  oriented X 3, normal insight, judgement and memory, affect and mood normal    Labs:   Lab Results   Component Value Date    WBC 10.2 03/15/2019     Lab Results   Component Value Date    RBC 3.39 03/15/2019     Lab Results   Component Value Date    HGB 11.3 03/21/2019     Lab Results   Component Value Date    HCT 31.2 03/15/2019     Lab Results   Component Value Date    MCV 92 03/15/2019     Lab Results   Component Value Date    MCH 30.7 03/15/2019     Lab Results   Component Value Date    MCHC 33.3 03/15/2019     Lab Results   Component Value Date    RDW 13.7 03/15/2019     Lab Results   Component Value Date     03/16/2019     Last Comprehensive Metabolic Panel:  Sodium   Date Value Ref Range Status   03/21/2019 137 133 - 144 mmol/L Final     Potassium   Date Value Ref Range Status   03/21/2019 3.7 3.4 - 5.3 mmol/L Final     Chloride   Date Value Ref Range Status   03/21/2019 101 94 - 109 mmol/L Final     Carbon Dioxide   Date Value Ref Range Status   03/21/2019 28 20 - 32 mmol/L Final     Anion Gap   Date Value Ref Range Status   03/21/2019 8 3 - 14 mmol/L Final     Glucose   Date Value Ref Range Status   03/21/2019 100 (H) 70 - 99 mg/dL Final     Urea Nitrogen   Date Value Ref Range Status    03/21/2019 19 7 - 30 mg/dL Final     Creatinine   Date Value Ref Range Status   03/21/2019 1.01 0.66 - 1.25 mg/dL Final     GFR Estimate   Date Value Ref Range Status   03/21/2019 88 >60 mL/min/[1.73_m2] Final     Comment:     Non  GFR Calc  Starting 12/18/2018, serum creatinine based estimated GFR (eGFR) will be   calculated using the Chronic Kidney Disease Epidemiology Collaboration   (CKD-EPI) equation.       Calcium   Date Value Ref Range Status   03/21/2019 8.9 8.5 - 10.1 mg/dL Final       ASSESSMENT / PLAN:  (I25.10) Coronary artery disease involving native heart without angina pectoris, unspecified vessel or lesion type  (primary encounter diagnosis)  (Z95.1) S/P CABG x 4  (I25.2) History of non-ST elevation myocardial infarction (NSTEMI)  Comment: incisions healing without signs of infection. Pain improving.   Plan: continue tramadol, tylenol,methocarbamol. Complete 7 days of gabapentin as prescribed.Discontinue lidoderm patches.  Continue ASA, statin, losartan. Follow up Cardiothoracic Surgery 3/27/2019, [facility] 4/22/2019.     (R60.0) Bilateral leg edema  Comment: mild edema, no other s/s of volume overload.   Plan: lasix 20 mg daily X 3 days. KCl  3 days. Continue compression stockings. Encouraged him to elevate his legs during the day.     (E66.01) Morbid obesity (H)  Comment: chronic and likely affecting his rehab and mobility  Plan: dietician will continue to follow     (D62) Anemia due to blood loss, acute  Comment: Hgb improved  Plan: follow Hgb prn    (I10) Essential hypertension  Comment: controlled  Plan: lasix as above. Hold triamterene-HCTZ while on lasix. Continue amlodipine, losartan. Monitor VS. BMP    (R53.81) Physical deconditioning  Comment: progressing in therapies  Plan: continue PHYSICAL THERAPY/OT. Goal is to return home with services, probably within  the week.       Electronically signed by:  KEATON Haji CNP             Sincerely,        Alberto  KEATON Mayberry CNP

## 2019-03-25 NOTE — PROGRESS NOTES
[facility]  [facility] Record Number:  [id number]  Place of Service where encounter took place:  [facility] [[id number]]  Chief Complaint   Patient presents with     RECHECK       HPI:    Edilberto Brooks  is a 48 year old (1970), who is being seen today for an episodic care visit.  HPI information obtained from: facility chart records, facility staff, patient report and [facility] chart review.   He came to this facility 3/18/2019 for short term rehab and medical management following hospitalization after presenting to the ED with chest pain. He had elevated troponin and angiogram showed severe multivessel coronary disease. He underwent CABG X 4 by Dr Rincon on 3/12/2019.  He was diuresed. Statin was started. Beta blocker was not started due to asymptomatic bradycardia with sleep. EP consulted and felt he has undiagnosed sleep apnea. Outpatient sleep study was recommended.      Today's concern is:     Coronary artery disease involving native heart without angina pectoris, unspecified vessel or lesion type-feeling well with improved strength. Reports surgical pain is slowly improving, relieved with tramadol and tylenol. Lidoderm patches are no longer adhering to his chest. Has noticed increased LE edema today. No calf pain. No cough, shortness of breath or chest pain. Weight is unchanged at 218 lbs. Wearing compression stockings.   S/P CABG x 4  History of non-ST elevation myocardial infarction (NSTEMI)  Bilateral leg edema  Morbid obesity (H)  Anemia due to blood loss, acute  Essential hypertension-BPs: 133/82, 108/70, 111/72, 124/83   HR: 68-82  Physical deconditioning-ambulating with supervision. Requires stand by assist with bathing.     Past Medical and Surgical History reviewed in Epic today.    MEDICATIONS:  Current Outpatient Medications   Medication Sig Dispense Refill     acetaminophen (TYLENOL) 500 MG tablet Take 1,000 mg by mouth 3 times daily And  "Daily PRN       amLODIPine (NORVASC) 10 MG tablet Take 10 mg by mouth daily       aspirin (ASA) 325 MG EC tablet Take 325 mg by mouth daily as needed for moderate pain        furosemide (LASIX) 20 MG tablet Take 20 mg by mouth daily 3/26, 3/27, 3/28/2019       guaiFENesin (ROBITUSSIN) 20 mg/mL SOLN solution Take 10 mLs by mouth every 4 hours as needed for cough 1000 mL 0     levothyroxine (SYNTHROID/LEVOTHROID) 50 MCG tablet Take 50 mcg by mouth daily       Lidocaine (LIDOCARE) 4 % Patch Place 1 patch onto the skin On in AM off in PM       losartan (COZAAR) 25 MG tablet Take 25 mg by mouth daily       methocarbamol (ROBAXIN) 750 MG tablet Take 1 tablet (750 mg) by mouth 4 times daily as needed for muscle spasms 120 tablet 1     pantoprazole (PROTONIX) 40 MG EC tablet Take 1 tablet (40 mg) by mouth every morning (before breakfast) 30 tablet 0     polyethylene glycol (MIRALAX/GLYCOLAX) packet Take 17 g by mouth 2 times daily 30 packet 1     potassium chloride ER (K-DUR/KLOR-CON M) 20 MEQ CR tablet Take 20 mEq by mouth daily 3/26, 3/27, 3/28/2019       rosuvastatin (CRESTOR) 20 MG tablet Take 1 tablet (20 mg) by mouth daily 30 tablet 3     senna-docusate (SENOKOT-S/PERICOLACE) 8.6-50 MG tablet Take 1 tablet by mouth 2 times daily as needed for constipation 60 tablet 0     traMADol (ULTRAM) 50 MG tablet Take 1 tablet (50 mg) by mouth every 6 hours as needed for moderate pain 50 tablet 0     triamterene-HCTZ (MAXZIDE) 75-50 MG tablet Take 1 tablet by mouth every morning HOLD 3/26, 3/27, 3/28/2019         REVIEW OF SYSTEMS:  4 point ROS including Respiratory, CV, GI and , other than that noted in the HPI,  is negative    Objective:  /82   Pulse 78   Temp 97.8  F (36.6  C)   Resp 18   Ht 1.905 m (6' 3\")   Wt 99.1 kg (218 lb 6.4 oz)   SpO2 97%   BMI 27.30 kg/m    Exam:  GENERAL APPEARANCE:  Alert, in no distress  ENT:  Mouth and posterior oropharynx normal, moist mucous membranes, normal hearing acuity  EYES: "  EOM normal, conjunctiva and lids normal  NECK:  No adenopathy,masses or thyromegaly  RESP:  respiratory effort and palpation of chest normal, lungs clear to auscultation , no respiratory distress  CV:  Palpation and auscultation of heart done , regular rate and rhythm, no murmur, +2 pedal pulses, peripheral edema 1-2+ in both LE, no calf tenderness or erythema  ABDOMEN:   soft, nontender, no hepatosplenomegaly or other masses  M/S:   gait steady. MART with good strength. No joint inflammation  SKIN:  sternal incision, chest tube sites and LLE incisions all clean, dry, intact, no erythema. No rashes or open areas  PSYCH:  oriented X 3, normal insight, judgement and memory, affect and mood normal    Labs:   Lab Results   Component Value Date    WBC 10.2 03/15/2019     Lab Results   Component Value Date    RBC 3.39 03/15/2019     Lab Results   Component Value Date    HGB 11.3 03/21/2019     Lab Results   Component Value Date    HCT 31.2 03/15/2019     Lab Results   Component Value Date    MCV 92 03/15/2019     Lab Results   Component Value Date    MCH 30.7 03/15/2019     Lab Results   Component Value Date    MCHC 33.3 03/15/2019     Lab Results   Component Value Date    RDW 13.7 03/15/2019     Lab Results   Component Value Date     03/16/2019     Last Comprehensive Metabolic Panel:  Sodium   Date Value Ref Range Status   03/21/2019 137 133 - 144 mmol/L Final     Potassium   Date Value Ref Range Status   03/21/2019 3.7 3.4 - 5.3 mmol/L Final     Chloride   Date Value Ref Range Status   03/21/2019 101 94 - 109 mmol/L Final     Carbon Dioxide   Date Value Ref Range Status   03/21/2019 28 20 - 32 mmol/L Final     Anion Gap   Date Value Ref Range Status   03/21/2019 8 3 - 14 mmol/L Final     Glucose   Date Value Ref Range Status   03/21/2019 100 (H) 70 - 99 mg/dL Final     Urea Nitrogen   Date Value Ref Range Status   03/21/2019 19 7 - 30 mg/dL Final     Creatinine   Date Value Ref Range Status   03/21/2019 1.01 0.66  - 1.25 mg/dL Final     GFR Estimate   Date Value Ref Range Status   03/21/2019 88 >60 mL/min/[1.73_m2] Final     Comment:     Non  GFR Calc  Starting 12/18/2018, serum creatinine based estimated GFR (eGFR) will be   calculated using the Chronic Kidney Disease Epidemiology Collaboration   (CKD-EPI) equation.       Calcium   Date Value Ref Range Status   03/21/2019 8.9 8.5 - 10.1 mg/dL Final       ASSESSMENT / PLAN:  (I25.10) Coronary artery disease involving native heart without angina pectoris, unspecified vessel or lesion type  (primary encounter diagnosis)  (Z95.1) S/P CABG x 4  (I25.2) History of non-ST elevation myocardial infarction (NSTEMI)  Comment: incisions healing without signs of infection. Pain improving.   Plan: continue tramadol, tylenol,methocarbamol. Complete 7 days of gabapentin as prescribed.Discontinue lidoderm patches.  Continue ASA, statin, losartan. Follow up Cardiothoracic Surgery 3/27/2019, [facility] 4/22/2019.     (R60.0) Bilateral leg edema  Comment: mild edema, no other s/s of volume overload.   Plan: lasix 20 mg daily X 3 days. KCl  3 days. Continue compression stockings. Encouraged him to elevate his legs during the day.     (E66.01) Morbid obesity (H)  Comment: chronic and likely affecting his rehab and mobility  Plan: dietician will continue to follow     (D62) Anemia due to blood loss, acute  Comment: Hgb improved  Plan: follow Hgb prn    (I10) Essential hypertension  Comment: controlled  Plan: lasix as above. Hold triamterene-HCTZ while on lasix. Continue amlodipine, losartan. Monitor VS. BMP    (R53.81) Physical deconditioning  Comment: progressing in therapies  Plan: continue PHYSICAL THERAPY/OT. Goal is to return home with services, probably within  the week.       Electronically signed by:  KEATON Haji CNP

## 2019-03-26 RX ORDER — POTASSIUM CHLORIDE 1500 MG/1
20 TABLET, EXTENDED RELEASE ORAL DAILY
COMMUNITY
End: 2019-04-22

## 2019-03-27 ENCOUNTER — OFFICE VISIT (OUTPATIENT)
Dept: CARDIOLOGY | Facility: CLINIC | Age: 49
End: 2019-03-27
Payer: COMMERCIAL

## 2019-03-27 VITALS — HEART RATE: 79 BPM | SYSTOLIC BLOOD PRESSURE: 118 MMHG | DIASTOLIC BLOOD PRESSURE: 76 MMHG

## 2019-03-27 DIAGNOSIS — Z95.1 S/P CABG (CORONARY ARTERY BYPASS GRAFT): Primary | ICD-10-CM

## 2019-03-27 RX ORDER — TRAMADOL HYDROCHLORIDE 50 MG/1
50 TABLET ORAL EVERY 6 HOURS PRN
Qty: 50 TABLET | Refills: 0 | Status: SHIPPED | OUTPATIENT
Start: 2019-03-27

## 2019-03-27 RX ORDER — GABAPENTIN 300 MG/1
300 CAPSULE ORAL 3 TIMES DAILY
Qty: 42 CAPSULE | Refills: 0 | Status: SHIPPED | DISCHARGE
Start: 2019-03-27

## 2019-03-27 NOTE — PATIENT INSTRUCTIONS
Your surgery was on March 12, 2019    Ok to start driving on April 9, 2019  as long as you are no longer taking narcotic pain medications.    No lifting greater than 10 pounds until May 7, 2019, then    No lifting greater than 20-30 pounds until June 4, 2019    Do not soak your incisions for 6 weeks (hot tubs, baths, pools etc) April 23, 2019    You should have a follow up sleep study    If you have questions or concerns, please call us:    Lisa Sauceda  [phone]

## 2019-03-27 NOTE — PROGRESS NOTES
CV Surgery Post Op Follow Up Note:     Assessment/Plan:   Patient is accompanied to clinic by his sister.  On 3/12/2019, Mr. Brooks underwent successful coronary artery bypass grafting x4, LIMA-LAD, SVG-DIAG, SVG-OM, SVG-LPDA; endoscopic harvest left greater saphenous vein by Dr. Mike Rincon.    Mr Brooks was discharged to [facility] where he is progressing well. He has been actively participating in therapy and has been consistently using his incentive spirometer, reaching [id number].    Patient endorses ongoing MSK pain and notes that his gabapentin prescription recently ran out; he has since had increasing pain and difficulty sleeping. Most of his pain in in his upper back and shoulder areas with some chest wall discomfort as well. He has been regularly taking tylenol and states that robaxin provides minimal relief.I have refilled his gabapentin for two more weeks and told him that at that point, it would be ok for him to start taking ibuprofen. I have refilled his tramadol for two more weeks as well.     Patient does have some ongoing BLE edema and is approximately 3 pounds up from his discharge weight. The provider at [facility] placed him on a 3-day course of lasix- I agree with this. He does have BLE 2+ edema. We discussed that his graft leg (left) may have more edema as part of the healing process.    Mr. Brooks's incisions are healing well with no noted erythema or drainage. His sternum feels stable and he denies popping or clicking.    Postoperative restrictions were reviewed. All of the patient and his sister's questions were answered. Our contact information was given to patient if they should have any further questions/concerns. No further follow-up is needed with us.    Thank you for allowing us to participate in this patient's care.    Subjective:    Per discharge summary:  48 year old male admitted last week with chest pain and found to have elevated troponin. Cor angio revealed severe multivessel coronary  disease. ECHO showed essentially preserved function. Following discussion of risks and benefits, he has elected to proceed with surgery.      On 3/12/2019, Mr. Brooks underwent successful coronary artery bypass grafting x4, LIMA-LAD, SVG-DIAG, SVG-OM, SVG-LPDA; endoscopic harvest left greater saphenous vein by Dr. Mike Rincon.     Intraoperative findings included a sternum of adequate quality. LIMA 2mm and good flow. Saphenous vein mostly 3-4mm and good quality. LPDA diffusely diseased/calcified. Grafted at best appearing site, still moderate disease, only 1.5mm, not a very good target. Non diseased distal LCx system/distal OM within 1.5cm of this vessel. OM 2mm and excellent target.  Diagonal moderate diffuse disease, grafted somewhat distally, minimal disease at this site, 1.75mm. LAD 2mm and good target.     Postoperatively, patient was taken to the intensive care unit for recovery in stable condition. Patient was extubated within protocol.  Blood pressure and cardiac index were managed with vasopressors and inotropic agents which were continuously weaned until no longer needed.  Patient was subsequently  transferred to the surgical telemetry floor.     Patient does have asymptomatic bradycardia associated with sleep. Electrophysiology was consulted and it is thought that patient has undiagnosed sleep apnea. It is recommended that he follow up with an outpatient sleep study. Because of this, patient will not discharge on a beta blocker.     While on the surgical unit, the patient continued to progress well. Chest tubes and temporary pacemaker wires were removed when deemed appropriate.     Patient was fluid overloaded and treated with diuretics. He is 6.2 kg belowpreoperative weight and will not discharge diuretic therapy; will re-evaluate need in clinic follow-up.      Patient was transiently hyperglycemic and treated with insulin infusion then transitioned to sliding scale insulin per protocol. Blood sugars  remained stable. No further glycemic control agents needed at this time.     On day of discharge, patient's pain was well controlled, was working well with therapies and stable for discharge to TCU/[facility] on POD # 6. He has coronary artery disease and will discharge on ASA, statin. Statin is new for this patient so he will have a 2 month follow up lipid profile and LFTs. Follow up with cardiology and cardiac surgery have been arranged. Pt encouraged to follow up with PCP and cardiac rehab upon discharge.    Allergies:   Allergies   Allergen Reactions     Codeine      Hypotension/ Dizziness          Medications:   Current Outpatient Medications:      acetaminophen (TYLENOL) 500 MG tablet, Take 1,000 mg by mouth 3 times daily And Daily PRN, Disp: , Rfl:      amLODIPine (NORVASC) 10 MG tablet, Take 10 mg by mouth daily, Disp: , Rfl:      aspirin (ASA) 325 MG EC tablet, Take 325 mg by mouth daily as needed for moderate pain , Disp: , Rfl:      furosemide (LASIX) 20 MG tablet, Take 20 mg by mouth daily 3/26, 3/27, 3/28/2019, Disp: , Rfl:      gabapentin (NEURONTIN) 300 MG capsule, Take 1 capsule (300 mg) by mouth 3 times daily, Disp: 42 capsule, Rfl: 0     levothyroxine (SYNTHROID/LEVOTHROID) 50 MCG tablet, Take 50 mcg by mouth daily, Disp: , Rfl:      losartan (COZAAR) 25 MG tablet, Take 25 mg by mouth daily, Disp: , Rfl:      methocarbamol (ROBAXIN) 750 MG tablet, Take 1 tablet (750 mg) by mouth 4 times daily as needed for muscle spasms, Disp: 120 tablet, Rfl: 1     pantoprazole (PROTONIX) 40 MG EC tablet, Take 1 tablet (40 mg) by mouth every morning (before breakfast), Disp: 30 tablet, Rfl: 0     potassium chloride ER (K-DUR/KLOR-CON M) 20 MEQ CR tablet, Take 20 mEq by mouth daily 3/26, 3/27, 3/28/2019, Disp: , Rfl:      rosuvastatin (CRESTOR) 20 MG tablet, Take 1 tablet (20 mg) by mouth daily, Disp: 30 tablet, Rfl: 3     traMADol (ULTRAM) 50 MG tablet, Take 1 tablet (50 mg) by mouth every 6 hours as needed for  moderate pain, Disp: 50 tablet, Rfl: 0     triamterene-HCTZ (MAXZIDE) 75-50 MG tablet, Take 1 tablet by mouth every morning HOLD 3/26, 3/27, 3/28/2019, Disp: , Rfl:      guaiFENesin (ROBITUSSIN) 20 mg/mL SOLN solution, Take 10 mLs by mouth every 4 hours as needed for cough (Patient not taking: Reported on 3/27/2019), Disp: 1000 mL, Rfl: 0     Lidocaine (LIDOCARE) 4 % Patch, Place 1 patch onto the skin On in AM off in PM, Disp: , Rfl:      polyethylene glycol (MIRALAX/GLYCOLAX) packet, Take 17 g by mouth 2 times daily (Patient not taking: Reported on 3/27/2019), Disp: 30 packet, Rfl: 1     senna-docusate (SENOKOT-S/PERICOLACE) 8.6-50 MG tablet, Take 1 tablet by mouth 2 times daily as needed for constipation (Patient not taking: Reported on 3/27/2019), Disp: 60 tablet, Rfl: 0    Objective:    Physical Exam:   /76   Pulse 79   Physical Exam   Constitutional:   Well nourished, well developed, resting comfortably - appears tired  Neck:   No appreciable JVD  Cardiovascular: S1, S2, regular rate and rhythm without murmurs, rubs or gallops  Pulmonary/Chest: Clear to auscultation bilaterally, with no wheezes or retractions. No respiratory distress.  Musculoskeletal:  BLE 2+ edema   Skin: Skin is warm and dry. Sternal incision CDI, LLE incisions CDI  Neurological: Alert and oriented to person, place, and time. Nonfocal exam  Psychiatric:  Normal mood and affect.    Roxi Dennis, NETO-C, CCRN-CSC  Pager: [phone]

## 2019-03-28 ENCOUNTER — HOSPITAL LABORATORY (OUTPATIENT)
Dept: OTHER | Facility: CLINIC | Age: 49
End: 2019-03-28

## 2019-03-28 LAB
ANION GAP SERPL CALCULATED.3IONS-SCNC: 6 MMOL/L (ref 3–14)
BUN SERPL-MCNC: 17 MG/DL (ref 7–30)
CALCIUM SERPL-MCNC: 8.7 MG/DL (ref 8.5–10.1)
CHLORIDE SERPL-SCNC: 106 MMOL/L (ref 94–109)
CO2 SERPL-SCNC: 29 MMOL/L (ref 20–32)
CREAT SERPL-MCNC: 0.97 MG/DL (ref 0.66–1.25)
GFR SERPL CREATININE-BSD FRML MDRD: >90 ML/MIN/{1.73_M2}
GLUCOSE SERPL-MCNC: 92 MG/DL (ref 70–99)
POTASSIUM SERPL-SCNC: 3.8 MMOL/L (ref 3.4–5.3)
SODIUM SERPL-SCNC: 141 MMOL/L (ref 133–144)

## 2019-03-29 ENCOUNTER — DISCHARGE SUMMARY NURSING HOME (OUTPATIENT)
Dept: GERIATRICS | Facility: CLINIC | Age: 49
End: 2019-03-29
Payer: COMMERCIAL

## 2019-03-29 DIAGNOSIS — R60.0 BILATERAL LEG EDEMA: ICD-10-CM

## 2019-03-29 DIAGNOSIS — Z95.1 S/P CABG X 4: ICD-10-CM

## 2019-03-29 DIAGNOSIS — I25.10 CORONARY ARTERY DISEASE INVOLVING NATIVE HEART WITHOUT ANGINA PECTORIS, UNSPECIFIED VESSEL OR LESION TYPE: Primary | ICD-10-CM

## 2019-03-29 DIAGNOSIS — I10 ESSENTIAL HYPERTENSION: ICD-10-CM

## 2019-03-29 DIAGNOSIS — I25.2 HISTORY OF NON-ST ELEVATION MYOCARDIAL INFARCTION (NSTEMI): ICD-10-CM

## 2019-03-29 DIAGNOSIS — R53.81 PHYSICAL DECONDITIONING: ICD-10-CM

## 2019-03-29 DIAGNOSIS — E66.01 MORBID OBESITY (H): ICD-10-CM

## 2019-03-29 DIAGNOSIS — D62 ANEMIA DUE TO BLOOD LOSS, ACUTE: ICD-10-CM

## 2019-03-29 PROCEDURE — 99316 NF DSCHRG MGMT 30 MIN+: CPT | Performed by: NURSE PRACTITIONER

## 2019-03-29 NOTE — LETTER
3/29/2019        RE: Edilberto Brooks  [address]  [place] [address]          [facility] DISCHARGE SUMMARY    PATIENT'S NAME: Edilberto Brooks  YOB: 1970  MEDICAL RECORD NUMBER:  [id number]  Place of Service where encounter took place:  [facility] ([facility]) [[id number]]    PRIMARY CARE PROVIDER AND CLINIC RESPONSIBLE AFTER TRANSFER: Karen Adler [address] / [place] [address]     TRANSFERRING PROVIDERS: KEATON Haji CNP, Per Veloz MD  DATE OF SNF ADMISSION:  March / 18 / 2019  DATE OF SNF (anticipated) DISCHARGE: April / 01 / 2019  DISCHARGE DISPOSITION: Non-FMG Provider   RECENT HOSPITALIZATION/ED:  [facility] stay 3/6/2019 to 3/18/2019.     CODE STATUS/ADVANCE DIRECTIVES DISCUSSION:   CPR/Full code      Allergies   Allergen Reactions     Codeine      Hypotension/ Dizziness        Condition on Discharge:  Improving.  Cognitive Scores: SLUMS 29/30    Equipment: no aids    DISCHARGE DIAGNOSIS:   1. Coronary artery disease involving native heart without angina pectoris, unspecified vessel or lesion type    2. S/P CABG x 4    3. History of non-ST elevation myocardial infarction (NSTEMI)    4. Morbid obesity (H)    5. Bilateral leg edema    6. Anemia due to blood loss, acute    7. Essential hypertension    8. Physical deconditioning        [facility] Nursing Facility Course:  HPI information obtained from: facility chart records, facility staff, patient report and [facility] chart review.  He came to this facility 3/18/2019 for short term rehab and medical management following hospitalization after presenting to the ED with chest pain. He had elevated troponin and angiogram showed severe multivessel coronary disease. He underwent CABG X 4 by Dr Rincon on 3/12/2019.  He was diuresed. Statin was started. Beta blocker was not started due to asymptomatic bradycardia with sleep. EP consulted and felt he has  undiagnosed sleep apnea. Outpatient sleep study was recommended.    He has worked with PHYSICAL THERAPY and OT with good progress. Ambulating unlimited distances with no device. TUG 12 seconds, indicating low fall risk. Requires assistance to maintain sternal precautions while getting out of bed, otherwise independent with cares.   ASSESSMENT / PLAN:  (I25.10) Coronary artery disease involving native heart without angina pectoris, unspecified vessel or lesion type  (primary encounter diagnosis)  (Z95.1) S/P CABG x 4  (I25.2) History of non-ST elevation myocardial infarction (NSTEMI)  Comment: incisions healing without signs of infection. He's had issue with pain control and gabapentin was increased at his Cardiothoracic Surgery appointment 3/27/2019. He has purchased a recliner to sleep at home.   Plan: discharge home 4/1/2019. He has filed an appeal with his insurance company, and may stay longer if he wins the appeal. Continue ASA, statin, losartan. Continue gabapentin, tramadol, tylenol, methocarbamol. Home services and follow up as below.     (E66.01) Morbid obesity (H)  Comment: chronic. Not compliant with cardiac healthy diet  Plan: encourage healthy diet and weight loss.     (R60.0) Bilateral leg edema  Comment: ongoing edema with no other s/s of volume overload. Weights are somewhat inconsistent. No s/s  of DVT. Amlodipine may be contributing.   Plan: continue lasix 20 mg daily. Compression stockings. Encouraged him to elevate legs during the day. Encouraged low sodium diet    (D62) Anemia due to blood loss, acute  Comment: Hgb improved  Plan: follow Hgb prn    (I10) Essential hypertension  Comment: controlled with BPs: 116/70, 132/78, 133/82, 108/70   HR: 78-82  Plan: continue lasix, amlodipine losartan.     (R53.81) Physical deconditioning  Comment: progress in therapies as above  Plan: home therapies until he's able to drive, then transition to outpatient Cardiac Rehab         PAST MEDICAL HISTORY:  has a  past medical history of CAD (coronary artery disease), Hypercholesteremia, Hypertension, Obesity, and Sinus node dysfunction (H).    DISCHARGE MEDICATIONS:  Current Outpatient Medications   Medication Sig Dispense Refill     acetaminophen (TYLENOL) 500 MG tablet Take 1,000 mg by mouth 3 times daily And Daily PRN       amLODIPine (NORVASC) 10 MG tablet Take 10 mg by mouth daily       aspirin (ASA) 325 MG EC tablet Take 325 mg by mouth daily as needed for moderate pain        colchicine (COLCYRS) 0.6 MG tablet Take by mouth daily On 3/30 take 1.2 mg PO x 1, then 0.6 mg one hour later  Starting 3/31-4/3 take 0.6 mg PO BID, then discontinue med       furosemide (LASIX) 20 MG tablet Take 20 mg by mouth daily        gabapentin (NEURONTIN) 300 MG capsule Take 1 capsule (300 mg) by mouth 3 times daily 42 capsule 0     guaiFENesin (ROBITUSSIN) 20 mg/mL SOLN solution Take 10 mLs by mouth every 4 hours as needed for cough 1000 mL 0     levothyroxine (SYNTHROID/LEVOTHROID) 50 MCG tablet Take 50 mcg by mouth daily       losartan (COZAAR) 25 MG tablet Take 25 mg by mouth daily       methocarbamol (ROBAXIN) 750 MG tablet Take 1 tablet (750 mg) by mouth 4 times daily as needed for muscle spasms 120 tablet 1     pantoprazole (PROTONIX) 40 MG EC tablet Take 1 tablet (40 mg) by mouth every morning (before breakfast) 30 tablet 0     polyethylene glycol (MIRALAX/GLYCOLAX) packet Take 17 g by mouth daily as needed for constipation       potassium chloride ER (K-DUR/KLOR-CON M) 20 MEQ CR tablet Take 20 mEq by mouth daily        rosuvastatin (CRESTOR) 20 MG tablet Take 1 tablet (20 mg) by mouth daily 30 tablet 3     senna-docusate (SENOKOT-S/PERICOLACE) 8.6-50 MG tablet Take 1 tablet by mouth 2 times daily as needed for constipation 60 tablet 0     traMADol (ULTRAM) 50 MG tablet Take 1 tablet (50 mg) by mouth every 6 hours as needed for moderate pain 50 tablet 0       MEDICATION CHANGES/RATIONALE:   Triamterine-HCTZ discontinued  Lasix  started for LE edema  KCl for replacement  Gabapentin increased  Tylenol scheduled for pain  Lidoderm patches discontinued  Controlled medications sent with patient:   Medication: tramadol , 30 tabs given to patient at the time of discharge to take home     ROS:    4 point ROS including Respiratory, CV, GI and , other than that noted in the HPI,  is negative    Physical Exam:   Vitals: /70   Pulse 78   Temp 98.2  F (36.8  C)   Resp 18   Wt 146.1 kg (322 lb)   BMI 40.25 kg/m     BMI= Body mass index is 40.25 kg/m .  GENERAL APPEARANCE:  Alert, in no distress  ENT:  Mouth and posterior oropharynx normal, moist mucous membranes, normal hearing acuity  EYES:  EOM normal, conjunctiva and lids normal  NECK:  No adenopathy,masses or thyromegaly  RESP:  respiratory effort and palpation of chest normal, lungs clear to auscultation , no respiratory distress  CV:  Palpation and auscultation of heart done , regular rate and rhythm, no murmur, +2 pedal pulses, peripheral edema 1-2+ in both LE, no calf tenderness or erythema  ABDOMEN:   soft, nontender, no hepatosplenomegaly or other masses  M/S:   gait steady. MART with good strength. No joint inflammation  SKIN:  sternal incision, chest tube sites and LLE incisions all clean, dry, intact, no erythema. No rashes or open areas  PSYCH:  oriented X 3, normal insight, judgement and memory, affect and mood normal      DISCHARGE PLAN:  Occupational Therapy, Physical Therapy, Registered Nurse, Home Health Aide and From:  [facility]  Patient instructed to follow-up with:  PCP in 7 days    Outpatient sleep study to be arranged by PCP  Follow up with Cardiothoracic Surgery prn.   Current [place] scheduled appointments:  Future Appointments   Date Time Provider Department Center   4/22/2019 12:45 PM Octavio Hughes MD [facility] CL[facility]       MTM referral needed and placed by this provider: No    Pending labs: None  SNF labs   Lab Results   Component Value Date     WBC 10.2 03/15/2019     Lab Results   Component Value Date    RBC 3.39 03/15/2019     Lab Results   Component Value Date    HGB 11.3 03/21/2019     Lab Results   Component Value Date    HCT 31.2 03/15/2019     Lab Results   Component Value Date    MCV 92 03/15/2019     Lab Results   Component Value Date    MCH 30.7 03/15/2019     Lab Results   Component Value Date    MCHC 33.3 03/15/2019     Lab Results   Component Value Date    RDW 13.7 03/15/2019     Lab Results   Component Value Date     03/16/2019     Last Comprehensive Metabolic Panel:  Sodium   Date Value Ref Range Status   03/28/2019 141 133 - 144 mmol/L Final     Potassium   Date Value Ref Range Status   03/28/2019 3.8 3.4 - 5.3 mmol/L Final     Chloride   Date Value Ref Range Status   03/28/2019 106 94 - 109 mmol/L Final     Carbon Dioxide   Date Value Ref Range Status   03/28/2019 29 20 - 32 mmol/L Final     Anion Gap   Date Value Ref Range Status   03/28/2019 6 3 - 14 mmol/L Final     Glucose   Date Value Ref Range Status   03/28/2019 92 70 - 99 mg/dL Final     Urea Nitrogen   Date Value Ref Range Status   03/28/2019 17 7 - 30 mg/dL Final     Creatinine   Date Value Ref Range Status   03/28/2019 0.97 0.66 - 1.25 mg/dL Final     GFR Estimate   Date Value Ref Range Status   03/28/2019 >90 >60 mL/min/[1.73_m2] Final     Comment:     Non  GFR Calc  Starting 12/18/2018, serum creatinine based estimated GFR (eGFR) will be   calculated using the Chronic Kidney Disease Epidemiology Collaboration   (CKD-EPI) equation.       Calcium   Date Value Ref Range Status   03/28/2019 8.7 8.5 - 10.1 mg/dL Final       Discharge Treatments:Daily weight. Sternal precautions. Cleanse incisions daily.     TOTAL DISCHARGE TIME:   Greater than 30 minutes  Electronically signed by:  KEATON Haji CNP      Sincerely,        KEATON Haji CNP

## 2019-03-29 NOTE — PROGRESS NOTES
[facility] DISCHARGE SUMMARY    PATIENT'S NAME: Edilberto Brooks  YOB: 1970  MEDICAL RECORD NUMBER:  [id number]  Place of Service where encounter took place[facility] [[id number]]    PRIMARY CARE PROVIDER AND CLINIC RESPONSIBLE AFTER TRANSFER: Karen Adler [address] / [place] [address]     TRANSFERRING PROVIDERS: KEATON Haji CNP, Per Veloz MD  DATE OF SNF ADMISSION:  March / 18 / 2019  DATE OF SNF (anticipated) DISCHARGE: April / 01 / 2019  DISCHARGE DISPOSITION: Non-FMG Provider   RECENT HOSPITALIZATION/ED:  [facility] stay 3/6/2019 to 3/18/2019.     CODE STATUS/ADVANCE DIRECTIVES DISCUSSION:   CPR/Full code      Allergies   Allergen Reactions     Codeine      Hypotension/ Dizziness        Condition on Discharge:  Improving.  Cognitive Scores: SLUMS 29/30    Equipment: no aids    DISCHARGE DIAGNOSIS:   1. Coronary artery disease involving native heart without angina pectoris, unspecified vessel or lesion type    2. S/P CABG x 4    3. History of non-ST elevation myocardial infarction (NSTEMI)    4. Morbid obesity (H)    5. Bilateral leg edema    6. Anemia due to blood loss, acute    7. Essential hypertension    8. Physical deconditioning        [facility] Nursing Facility Course:  HPI information obtained from: facility chart records, facility staff, patient report and [facility] chart review.  He came to this facility 3/18/2019 for short term rehab and medical management following hospitalization after presenting to the ED with chest pain. He had elevated troponin and angiogram showed severe multivessel coronary disease. He underwent CABG X 4 by Dr Rincon on 3/12/2019.  He was diuresed. Statin was started. Beta blocker was not started due to asymptomatic bradycardia with sleep. EP consulted and felt he has undiagnosed sleep apnea. Outpatient sleep study was recommended.    He has worked with  PHYSICAL THERAPY and OT with good progress. Ambulating unlimited distances with no device. TUG 12 seconds, indicating low fall risk. Requires assistance to maintain sternal precautions while getting out of bed, otherwise independent with cares.   ASSESSMENT / PLAN:  (I25.10) Coronary artery disease involving native heart without angina pectoris, unspecified vessel or lesion type  (primary encounter diagnosis)  (Z95.1) S/P CABG x 4  (I25.2) History of non-ST elevation myocardial infarction (NSTEMI)  Comment: incisions healing without signs of infection. He's had issue with pain control and gabapentin was increased at his Cardiothoracic Surgery appointment 3/27/2019. He has purchased a recliner to sleep at home.   Plan: discharge home 4/1/2019. He has filed an appeal with his insurance company, and may stay longer if he wins the appeal. Continue ASA, statin, losartan. Continue gabapentin, tramadol, tylenol, methocarbamol. Home services and follow up as below.     (E66.01) Morbid obesity (H)  Comment: chronic. Not compliant with cardiac healthy diet  Plan: encourage healthy diet and weight loss.     (R60.0) Bilateral leg edema  Comment: ongoing edema with no other s/s of volume overload. Weights are somewhat inconsistent. No s/s  of DVT. Amlodipine may be contributing.   Plan: continue lasix 20 mg daily. Compression stockings. Encouraged him to elevate legs during the day. Encouraged low sodium diet    (D62) Anemia due to blood loss, acute  Comment: Hgb improved  Plan: follow Hgb prn    (I10) Essential hypertension  Comment: controlled with BPs: 116/70, 132/78, 133/82, 108/70   HR: 78-82  Plan: continue lasix, amlodipine losartan.     (R53.81) Physical deconditioning  Comment: progress in therapies as above  Plan: home therapies until he's able to drive, then transition to outpatient Cardiac Rehab         PAST MEDICAL HISTORY:  has a past medical history of CAD (coronary artery disease), Hypercholesteremia,  Hypertension, Obesity, and Sinus node dysfunction (H).    DISCHARGE MEDICATIONS:  Current Outpatient Medications   Medication Sig Dispense Refill     acetaminophen (TYLENOL) 500 MG tablet Take 1,000 mg by mouth 3 times daily And Daily PRN       amLODIPine (NORVASC) 10 MG tablet Take 10 mg by mouth daily       aspirin (ASA) 325 MG EC tablet Take 325 mg by mouth daily as needed for moderate pain        colchicine (COLCYRS) 0.6 MG tablet Take by mouth daily On 3/30 take 1.2 mg PO x 1, then 0.6 mg one hour later  Starting 3/31-4/3 take 0.6 mg PO BID, then discontinue med       furosemide (LASIX) 20 MG tablet Take 20 mg by mouth daily        gabapentin (NEURONTIN) 300 MG capsule Take 1 capsule (300 mg) by mouth 3 times daily 42 capsule 0     guaiFENesin (ROBITUSSIN) 20 mg/mL SOLN solution Take 10 mLs by mouth every 4 hours as needed for cough 1000 mL 0     levothyroxine (SYNTHROID/LEVOTHROID) 50 MCG tablet Take 50 mcg by mouth daily       losartan (COZAAR) 25 MG tablet Take 25 mg by mouth daily       methocarbamol (ROBAXIN) 750 MG tablet Take 1 tablet (750 mg) by mouth 4 times daily as needed for muscle spasms 120 tablet 1     pantoprazole (PROTONIX) 40 MG EC tablet Take 1 tablet (40 mg) by mouth every morning (before breakfast) 30 tablet 0     polyethylene glycol (MIRALAX/GLYCOLAX) packet Take 17 g by mouth daily as needed for constipation       potassium chloride ER (K-DUR/KLOR-CON M) 20 MEQ CR tablet Take 20 mEq by mouth daily        rosuvastatin (CRESTOR) 20 MG tablet Take 1 tablet (20 mg) by mouth daily 30 tablet 3     senna-docusate (SENOKOT-S/PERICOLACE) 8.6-50 MG tablet Take 1 tablet by mouth 2 times daily as needed for constipation 60 tablet 0     traMADol (ULTRAM) 50 MG tablet Take 1 tablet (50 mg) by mouth every 6 hours as needed for moderate pain 50 tablet 0       MEDICATION CHANGES/RATIONALE:   Triamterine-HCTZ discontinued  Lasix started for LE edema  KCl for replacement  Gabapentin increased  Tylenol  scheduled for pain  Lidoderm patches discontinued  Controlled medications sent with patient:   Medication: tramadol , 30 tabs given to patient at the time of discharge to take home     ROS:    4 point ROS including Respiratory, CV, GI and , other than that noted in the HPI,  is negative    Physical Exam:   Vitals: /70   Pulse 78   Temp 98.2  F (36.8  C)   Resp 18   Wt 146.1 kg (322 lb)   BMI 40.25 kg/m    BMI= Body mass index is 40.25 kg/m .  GENERAL APPEARANCE:  Alert, in no distress  ENT:  Mouth and posterior oropharynx normal, moist mucous membranes, normal hearing acuity  EYES:  EOM normal, conjunctiva and lids normal  NECK:  No adenopathy,masses or thyromegaly  RESP:  respiratory effort and palpation of chest normal, lungs clear to auscultation , no respiratory distress  CV:  Palpation and auscultation of heart done , regular rate and rhythm, no murmur, +2 pedal pulses, peripheral edema 1-2+ in both LE, no calf tenderness or erythema  ABDOMEN:   soft, nontender, no hepatosplenomegaly or other masses  M/S:   gait steady. MART with good strength. No joint inflammation  SKIN:  sternal incision, chest tube sites and LLE incisions all clean, dry, intact, no erythema. No rashes or open areas  PSYCH:  oriented X 3, normal insight, judgement and memory, affect and mood normal      DISCHARGE PLAN:  Occupational Therapy, Physical Therapy, Registered Nurse, Home Health Aide and From:  [facility]  Patient instructed to follow-up with:  PCP in 7 days    Outpatient sleep study to be arranged by PCP  Follow up with Cardiothoracic Surgery prn.   Current [facility] scheduled appointments:  Future Appointments   Date Time Provider Department Center   4/22/2019 12:45 PM Octavio Hughes MD [facility] PSA CLIN       MTM referral needed and placed by this provider: No    Pending labs: None  SNF labs   Lab Results   Component Value Date    WBC 10.2 03/15/2019     Lab Results   Component Value Date    RBC 3.39  03/15/2019     Lab Results   Component Value Date    HGB 11.3 03/21/2019     Lab Results   Component Value Date    HCT 31.2 03/15/2019     Lab Results   Component Value Date    MCV 92 03/15/2019     Lab Results   Component Value Date    MCH 30.7 03/15/2019     Lab Results   Component Value Date    MCHC 33.3 03/15/2019     Lab Results   Component Value Date    RDW 13.7 03/15/2019     Lab Results   Component Value Date     03/16/2019     Last Comprehensive Metabolic Panel:  Sodium   Date Value Ref Range Status   03/28/2019 141 133 - 144 mmol/L Final     Potassium   Date Value Ref Range Status   03/28/2019 3.8 3.4 - 5.3 mmol/L Final     Chloride   Date Value Ref Range Status   03/28/2019 106 94 - 109 mmol/L Final     Carbon Dioxide   Date Value Ref Range Status   03/28/2019 29 20 - 32 mmol/L Final     Anion Gap   Date Value Ref Range Status   03/28/2019 6 3 - 14 mmol/L Final     Glucose   Date Value Ref Range Status   03/28/2019 92 70 - 99 mg/dL Final     Urea Nitrogen   Date Value Ref Range Status   03/28/2019 17 7 - 30 mg/dL Final     Creatinine   Date Value Ref Range Status   03/28/2019 0.97 0.66 - 1.25 mg/dL Final     GFR Estimate   Date Value Ref Range Status   03/28/2019 >90 >60 mL/min/[1.73_m2] Final     Comment:     Non  GFR Calc  Starting 12/18/2018, serum creatinine based estimated GFR (eGFR) will be   calculated using the Chronic Kidney Disease Epidemiology Collaboration   (CKD-EPI) equation.       Calcium   Date Value Ref Range Status   03/28/2019 8.7 8.5 - 10.1 mg/dL Final       Discharge Treatments:Daily weight. Sternal precautions. Cleanse incisions daily.     TOTAL DISCHARGE TIME:   Greater than 30 minutes  Electronically signed by:  KEATON Haji CNP

## 2019-03-30 ENCOUNTER — NURSING HOME VISIT (OUTPATIENT)
Dept: GERIATRICS | Facility: CLINIC | Age: 49
End: 2019-03-30
Payer: COMMERCIAL

## 2019-03-30 VITALS
DIASTOLIC BLOOD PRESSURE: 70 MMHG | HEART RATE: 78 BPM | OXYGEN SATURATION: 98 % | WEIGHT: 315 LBS | SYSTOLIC BLOOD PRESSURE: 116 MMHG | BODY MASS INDEX: 39.17 KG/M2 | TEMPERATURE: 98.2 F | HEIGHT: 75 IN | RESPIRATION RATE: 18 BRPM

## 2019-03-30 VITALS
HEART RATE: 78 BPM | TEMPERATURE: 98.2 F | RESPIRATION RATE: 18 BRPM | SYSTOLIC BLOOD PRESSURE: 116 MMHG | WEIGHT: 315 LBS | BODY MASS INDEX: 40.25 KG/M2 | DIASTOLIC BLOOD PRESSURE: 70 MMHG

## 2019-03-30 DIAGNOSIS — I25.10 CAD IN NATIVE ARTERY: ICD-10-CM

## 2019-03-30 DIAGNOSIS — M10.9 ACUTE GOUT OF RIGHT ANKLE, UNSPECIFIED CAUSE: Primary | ICD-10-CM

## 2019-03-30 PROCEDURE — 99309 SBSQ NF CARE MODERATE MDM 30: CPT | Performed by: NURSE PRACTITIONER

## 2019-03-30 RX ORDER — COLCHICINE 0.6 MG/1
TABLET ORAL DAILY
COMMUNITY
Start: 2019-03-30 | End: 2019-04-03

## 2019-03-30 RX ORDER — POLYETHYLENE GLYCOL 3350 17 G/17G
17 POWDER, FOR SOLUTION ORAL DAILY PRN
COMMUNITY

## 2019-03-30 ASSESSMENT — MIFFLIN-ST. JEOR: SCORE: 2442.99

## 2019-03-30 NOTE — PROGRESS NOTES
[facility]  [place] Medical Record Number:  [id number]  Place of Service where encounter took place:  [facility] ([facility]) [[id number]]  Chief Complaint   Patient presents with     Nursing Home Acute       HPI:    Edilberto Brooks  is a 48 year old (1970), who is being seen today for an episodic care visit.  HPI information obtained from: facility chart records, facility staff, patient report and [facility] chart review. Today's concern is:  1. Acute gout of right ankle, unspecified cause    2. CAD in native artery    patient with hx CAD and recent CABG, now has a gout flare. Was recently treated with colchicine. Also on statin and aware of possible interaction. Right ankle acutely painful - reports symptoms just like during previous flare, no injury or redness no swelling. Due to discharge on 4/1/19.    Past Medical and Surgical History reviewed in Epic today.    MEDICATIONS:  Current Outpatient Medications   Medication Sig Dispense Refill     acetaminophen (TYLENOL) 500 MG tablet Take 1,000 mg by mouth 3 times daily And Daily PRN       amLODIPine (NORVASC) 10 MG tablet Take 10 mg by mouth daily       aspirin (ASA) 325 MG EC tablet Take 325 mg by mouth daily as needed for moderate pain        furosemide (LASIX) 20 MG tablet Take 20 mg by mouth daily        gabapentin (NEURONTIN) 300 MG capsule Take 1 capsule (300 mg) by mouth 3 times daily 42 capsule 0     guaiFENesin (ROBITUSSIN) 20 mg/mL SOLN solution Take 10 mLs by mouth every 4 hours as needed for cough 1000 mL 0     levothyroxine (SYNTHROID/LEVOTHROID) 50 MCG tablet Take 50 mcg by mouth daily       losartan (COZAAR) 25 MG tablet Take 25 mg by mouth daily       methocarbamol (ROBAXIN) 750 MG tablet Take 1 tablet (750 mg) by mouth 4 times daily as needed for muscle spasms 120 tablet 1     pantoprazole (PROTONIX) 40 MG EC tablet Take 1 tablet (40 mg) by mouth every morning (before breakfast) 30 tablet 0     polyethylene  "glycol (MIRALAX/GLYCOLAX) packet Take 17 g by mouth daily as needed for constipation       potassium chloride ER (K-DUR/KLOR-CON M) 20 MEQ CR tablet Take 20 mEq by mouth daily        rosuvastatin (CRESTOR) 20 MG tablet Take 1 tablet (20 mg) by mouth daily 30 tablet 3     senna-docusate (SENOKOT-S/PERICOLACE) 8.6-50 MG tablet Take 1 tablet by mouth 2 times daily as needed for constipation 60 tablet 0     traMADol (ULTRAM) 50 MG tablet Take 1 tablet (50 mg) by mouth every 6 hours as needed for moderate pain 50 tablet 0     REVIEW OF SYSTEMS:  4 point ROS including Respiratory, CV, GI and , other than that noted in the HPI,  is negative    Objective:  /70   Pulse 78   Temp 98.2  F (36.8  C)   Resp 18   Ht 1.905 m (6' 3\")   Wt 148.2 kg (326 lb 12.8 oz)   SpO2 98%   BMI 40.85 kg/m    Exam:  Awake male, no distress, on room air.   Chest wall incision healing well.   LEs: no edema.   Right ankle minimally more warm to touch but no redness swelling or bruising,   Cranial nerves 2-12 grossly intact, speech clear.     Labs:   Estimated Creatinine Clearance: 146.5 mL/min (based on SCr of 0.97 mg/dL).     ASSESSMENT/PLAN:  (M10.9) Acute gout of right ankle, unspecified cause  (primary encounter diagnosis)  (I25.10) CAD in native artery  Comment: acute onset gout symptoms, known CAD  Plan: start colchicine, hold statin while taking - resume once done.     Orders written by provider at facility  1. Hold crestor x 5 days, then resume  2. Colchicine 1.2 mg PO NOW, then 0.6 mg PO one hour later. Starting 3/31-4/3 take Colchicine 0.6 mg PO BID diagnosis gout    Electronically signed by:  KEATON Ashton CNP           "

## 2019-03-30 NOTE — LETTER
3/30/2019        RE: Edilberto Brooks  [address]  [place] [address]        [facility]  [place] Medical Record Number:  [id number]  [facility] of [facility] where encounter took place[facility] [[id number]]  Chief Complaint   Patient presents with     Nursing Home Acute       HPI:    Edilberto Brooks  is a 48 year old (1970), who is being seen today for an episodic care visit.  HPI information obtained from: facility chart records, facility staff, patient report and [facility] chart review. Today's concern is:  1. Acute gout of right ankle, unspecified cause    2. CAD in native artery    patient with hx CAD and recent CABG, now has a gout flare. Was recently treated with colchicine. Also on statin and aware of possible interaction. Right ankle acutely painful - reports symptoms just like during previous flare, no injury or redness no swelling. Due to discharge on 4/1/19.    Past Medical and Surgical History reviewed in Epic today.    MEDICATIONS:  Current Outpatient Medications   Medication Sig Dispense Refill     acetaminophen (TYLENOL) 500 MG tablet Take 1,000 mg by mouth 3 times daily And Daily PRN       amLODIPine (NORVASC) 10 MG tablet Take 10 mg by mouth daily       aspirin (ASA) 325 MG EC tablet Take 325 mg by mouth daily as needed for moderate pain        furosemide (LASIX) 20 MG tablet Take 20 mg by mouth daily        gabapentin (NEURONTIN) 300 MG capsule Take 1 capsule (300 mg) by mouth 3 times daily 42 capsule 0     guaiFENesin (ROBITUSSIN) 20 mg/mL SOLN solution Take 10 mLs by mouth every 4 hours as needed for cough 1000 mL 0     levothyroxine (SYNTHROID/LEVOTHROID) 50 MCG tablet Take 50 mcg by mouth daily       losartan (COZAAR) 25 MG tablet Take 25 mg by mouth daily       methocarbamol (ROBAXIN) 750 MG tablet Take 1 tablet (750 mg) by mouth 4 times daily as needed for muscle spasms 120 tablet 1     pantoprazole (PROTONIX) 40 MG EC tablet Take  "1 tablet (40 mg) by mouth every morning (before breakfast) 30 tablet 0     polyethylene glycol (MIRALAX/GLYCOLAX) packet Take 17 g by mouth daily as needed for constipation       potassium chloride ER (K-DUR/KLOR-CON M) 20 MEQ CR tablet Take 20 mEq by mouth daily        rosuvastatin (CRESTOR) 20 MG tablet Take 1 tablet (20 mg) by mouth daily 30 tablet 3     senna-docusate (SENOKOT-S/PERICOLACE) 8.6-50 MG tablet Take 1 tablet by mouth 2 times daily as needed for constipation 60 tablet 0     traMADol (ULTRAM) 50 MG tablet Take 1 tablet (50 mg) by mouth every 6 hours as needed for moderate pain 50 tablet 0     REVIEW OF SYSTEMS:  4 point ROS including Respiratory, CV, GI and , other than that noted in the HPI,  is negative    Objective:  /70   Pulse 78   Temp 98.2  F (36.8  C)   Resp 18   Ht 1.905 m (6' 3\")   Wt 148.2 kg (326 lb 12.8 oz)   SpO2 98%   BMI 40.85 kg/m     Exam:  Awake male, no distress, on room air.   Chest wall incision healing well.   LEs: no edema.   Right ankle minimally more warm to touch but no redness swelling or bruising,   Cranial nerves 2-12 grossly intact, speech clear.     Labs:   Estimated Creatinine Clearance: 146.5 mL/min (based on SCr of 0.97 mg/dL).     ASSESSMENT/PLAN:  (M10.9) Acute gout of right ankle, unspecified cause  (primary encounter diagnosis)  (I25.10) CAD in native artery  Comment: acute onset gout symptoms, known CAD  Plan: start colchicine, hold statin while taking - resume once done.     Orders written by provider at facility  1. Hold crestor x 5 days, then resume  2. Colchicine 1.2 mg PO NOW, then 0.6 mg PO one hour later. Starting 3/31-4/3 take Colchicine 0.6 mg PO BID diagnosis gout    Electronically signed by:  KEATON Ashton CNP               Sincerely,        KEATON Ashton CNP    "

## 2019-04-01 ENCOUNTER — NURSING HOME VISIT (OUTPATIENT)
Dept: GERIATRICS | Facility: CLINIC | Age: 49
End: 2019-04-01
Payer: COMMERCIAL

## 2019-04-01 VITALS
SYSTOLIC BLOOD PRESSURE: 137 MMHG | TEMPERATURE: 98.2 F | HEART RATE: 89 BPM | BODY MASS INDEX: 39.17 KG/M2 | DIASTOLIC BLOOD PRESSURE: 86 MMHG | OXYGEN SATURATION: 98 % | RESPIRATION RATE: 17 BRPM | WEIGHT: 315 LBS | HEIGHT: 75 IN

## 2019-04-01 DIAGNOSIS — I10 BENIGN ESSENTIAL HYPERTENSION: ICD-10-CM

## 2019-04-01 DIAGNOSIS — E66.01 MORBID OBESITY (H): ICD-10-CM

## 2019-04-01 DIAGNOSIS — Z95.1 S/P CABG X 4: ICD-10-CM

## 2019-04-01 DIAGNOSIS — I21.4 NSTEMI (NON-ST ELEVATED MYOCARDIAL INFARCTION) (H): Primary | ICD-10-CM

## 2019-04-01 DIAGNOSIS — I10 ESSENTIAL HYPERTENSION: ICD-10-CM

## 2019-04-01 DIAGNOSIS — M10.9 ACUTE GOUT OF RIGHT ANKLE, UNSPECIFIED CAUSE: Primary | ICD-10-CM

## 2019-04-01 DIAGNOSIS — R53.81 PHYSICAL DECONDITIONING: ICD-10-CM

## 2019-04-01 DIAGNOSIS — I25.10 CORONARY ARTERY DISEASE INVOLVING NATIVE HEART WITHOUT ANGINA PECTORIS, UNSPECIFIED VESSEL OR LESION TYPE: ICD-10-CM

## 2019-04-01 DIAGNOSIS — Z95.1 S/P CABG (CORONARY ARTERY BYPASS GRAFT): ICD-10-CM

## 2019-04-01 DIAGNOSIS — I25.2 HISTORY OF NON-ST ELEVATION MYOCARDIAL INFARCTION (NSTEMI): ICD-10-CM

## 2019-04-01 DIAGNOSIS — R60.0 BILATERAL LEG EDEMA: ICD-10-CM

## 2019-04-01 DIAGNOSIS — I25.10 CORONARY ARTERY DISEASE INVOLVING NATIVE CORONARY ARTERY OF NATIVE HEART WITHOUT ANGINA PECTORIS: ICD-10-CM

## 2019-04-01 PROCEDURE — 99309 SBSQ NF CARE MODERATE MDM 30: CPT | Performed by: NURSE PRACTITIONER

## 2019-04-01 RX ORDER — PREDNISONE 20 MG/1
20 TABLET ORAL DAILY
COMMUNITY
Start: 2019-04-01 | End: 2019-04-03

## 2019-04-01 ASSESSMENT — MIFFLIN-ST. JEOR: SCORE: 2439.36

## 2019-04-01 NOTE — PROGRESS NOTES
[facility]  [place] Medical Record Number:  [id number]  Place of Service where encounter took place:  [facility] [[id number]]  Chief Complaint   Patient presents with     RECHECK       HPI:    Edilberto Brooks  is a 48 year old (1970), who is being seen today for an episodic care visit.  HPI information obtained from: facility chart records, facility staff, patient report and [facility] chart review.   He came to this facility 3/18/2019 for short term rehab and medical management following hospitalization after presenting to the ED with chest pain. He had elevated troponin and angiogram showed severe multivessel coronary disease. He underwent CABG X 4 by Dr Rincon on 3/12/2019.  He was diuresed. Statin was started. Beta blocker was not started due to asymptomatic bradycardia with sleep. EP consulted and felt he has undiagnosed sleep apnea. Outpatient sleep study was recommended. Ortho consulted for acute gout of right ankle, treated with prednisone and colchicine.     Today's concern is:     Acute gout of right ankle, unspecified cause-he was scheduled to discharge home today but has developed severe pain of his right ankle, presumable due to gout, and is unable to ambulate. Symptoms began 3/30/2019 and colchicine was started. He reports no improvement and has not been out of bed for 3 days.   Coronary artery disease involving native heart without angina pectoris, unspecified vessel or lesion type  S/P CABG x 4-denies cough, shortness of breath or chest pain. Using tramadol and gabapentin with some improvement in surgical pain.   History of non-ST elevation myocardial infarction (NSTEMI)  Bilateral leg edema-lasix started last week for increasing LE edema. Weight is actually up 8 lbs since lasix was started. LE edema has improved since he's been staying in bed. He has not been following a low sodium heart healthy diet.   Morbid obesity (H)  Essential hypertension-BPs:  132/78, 106/74, 116/70   HR: 78-89  Physical deconditioning-prior to his right ankle pain, he was ambulating unlimited distances and independent with cares.     Past Medical and Surgical History reviewed in Epic today.    MEDICATIONS:  Current Outpatient Medications   Medication Sig Dispense Refill     acetaminophen (TYLENOL) 500 MG tablet Take 1,000 mg by mouth 3 times daily And Daily PRN       amLODIPine (NORVASC) 10 MG tablet Take 10 mg by mouth daily       aspirin (ASA) 325 MG EC tablet Take 325 mg by mouth daily       colchicine (COLCYRS) 0.6 MG tablet Take by mouth daily On 3/30 take 1.2 mg PO x 1, then 0.6 mg one hour later  Starting 3/31-4/3 take 0.6 mg PO BID, then discontinue med       furosemide (LASIX) 20 MG tablet Take 20 mg by mouth daily        gabapentin (NEURONTIN) 300 MG capsule Take 1 capsule (300 mg) by mouth 3 times daily 42 capsule 0     guaiFENesin (ROBITUSSIN) 20 mg/mL SOLN solution Take 10 mLs by mouth every 4 hours as needed for cough 1000 mL 0     levothyroxine (SYNTHROID/LEVOTHROID) 50 MCG tablet Take 50 mcg by mouth daily       losartan (COZAAR) 25 MG tablet Take 25 mg by mouth daily       methocarbamol (ROBAXIN) 750 MG tablet Take 1 tablet (750 mg) by mouth 4 times daily as needed for muscle spasms 120 tablet 1     pantoprazole (PROTONIX) 40 MG EC tablet Take 1 tablet (40 mg) by mouth every morning (before breakfast) 30 tablet 0     polyethylene glycol (MIRALAX/GLYCOLAX) packet Take 17 g by mouth daily as needed for constipation       potassium chloride ER (K-DUR/KLOR-CON M) 20 MEQ CR tablet Take 20 mEq by mouth daily        predniSONE (DELTASONE) 20 MG tablet Take 20 mg by mouth daily.       rosuvastatin (CRESTOR) 20 MG tablet Take 1 tablet (20 mg) by mouth daily 30 tablet 3     senna-docusate (SENOKOT-S/PERICOLACE) 8.6-50 MG tablet Take 1 tablet by mouth 2 times daily as needed for constipation 60 tablet 0     traMADol (ULTRAM) 50 MG tablet Take 1 tablet (50 mg) by mouth every 6 hours  "as needed for moderate pain 50 tablet 0       REVIEW OF SYSTEMS:  10 point ROS of systems including Constitutional, Eyes, Respiratory, Cardiovascular, Gastroenterology, Genitourinary, Integumentary, Musculoskeletal, Psychiatric were all negative except for pertinent positives noted in my HPI.    Objective:  /86   Pulse 89   Temp 98.2  F (36.8  C)   Resp 17   Ht 1.905 m (6' 3\")   Wt 147.9 kg (326 lb)   SpO2 98%   BMI 40.75 kg/m    Exam:  GENERAL APPEARANCE:  Alert, in no distress, morbidly obese  ENT:  Mouth and posterior oropharynx normal, moist mucous membranes, normal hearing acuity  EYES:  EOM normal, conjunctiva and lids normal  NECK:  No adenopathy,masses or thyromegaly  RESP:  respiratory effort and palpation of chest normal, lungs clear to auscultation , no respiratory distress  CV:  Palpation and auscultation of heart done , regular rate and rhythm, no murmur, rub, or gallop, +2 pedal pulses, peripheral edema 1+ in both LE  ABDOMEN:  soft, non-tender, no distension, no masses  M/S:   in bed. MART with good strength. Mild edema both feet, no erythema or warmth of right ankle, full ROM  SKIN:  no rashes or open areas  PSYCH:  oriented X 3, normal insight, judgement and memory, affect and mood normal    Labs:   Lab Results   Component Value Date    WBC 10.2 03/15/2019     Lab Results   Component Value Date    RBC 3.39 03/15/2019     Lab Results   Component Value Date    HGB 11.3 03/21/2019     Lab Results   Component Value Date    HCT 31.2 03/15/2019     Lab Results   Component Value Date    MCV 92 03/15/2019     Lab Results   Component Value Date    MCH 30.7 03/15/2019     Lab Results   Component Value Date    MCHC 33.3 03/15/2019     Lab Results   Component Value Date    RDW 13.7 03/15/2019     Lab Results   Component Value Date     03/16/2019     Last Comprehensive Metabolic Panel:  Sodium   Date Value Ref Range Status   03/28/2019 141 133 - 144 mmol/L Final     Potassium   Date Value Ref " Range Status   03/28/2019 3.8 3.4 - 5.3 mmol/L Final     Chloride   Date Value Ref Range Status   03/28/2019 106 94 - 109 mmol/L Final     Carbon Dioxide   Date Value Ref Range Status   03/28/2019 29 20 - 32 mmol/L Final     Anion Gap   Date Value Ref Range Status   03/28/2019 6 3 - 14 mmol/L Final     Glucose   Date Value Ref Range Status   03/28/2019 92 70 - 99 mg/dL Final     Urea Nitrogen   Date Value Ref Range Status   03/28/2019 17 7 - 30 mg/dL Final     Creatinine   Date Value Ref Range Status   03/28/2019 0.97 0.66 - 1.25 mg/dL Final     GFR Estimate   Date Value Ref Range Status   03/28/2019 >90 >60 mL/min/[1.73_m2] Final     Comment:     Non  GFR Calc  Starting 12/18/2018, serum creatinine based estimated GFR (eGFR) will be   calculated using the Chronic Kidney Disease Epidemiology Collaboration   (CKD-EPI) equation.       Calcium   Date Value Ref Range Status   03/28/2019 8.7 8.5 - 10.1 mg/dL Final         ASSESSMENT / PLAN:  (M10.9) Acute gout of right ankle, unspecified cause  (primary encounter diagnosis)  Comment: no improvement with colchicine  Plan: prednisone 20 mg daily X 3 days. Continue colchicine as ordered.     (I25.10) Coronary artery disease involving native heart without angina pectoris, unspecified vessel or lesion type  (Z95.1) S/P CABG x 4  (I25.2) History of non-ST elevation myocardial infarction (NSTEMI)  Comment: incision healing without signs of infection. Pain controlled  Plan: continue ASA, statin, losartan. Continue tramadol, gabapentin, tylenol, methocarbamol. Cardiology follow up as scheduled.     (R60.0) Bilateral leg edema  (E66.01) Morbid obesity (H)  Comment: LE edema improved, although weight is up 8 lbs. Weight gain may not be all fluid related. No other s/s of volume overload.   Plan: increase lasix to 20 mg bid for 3 days, then back to 20 mg daily. Compression stockings during the day. Low sodium, heart healthy diet. BMP    (I10) Essential  hypertension  Comment: controlled  Plan: continue lasix, amlodipine, losartan. Monitor VS    (R53.81) Physical deconditioning  Comment: was making good progress in therapies until gout flare  Plan: strongly encouraged him to increase his activity level and get out of bed.       Electronically signed by:  KEATON Haji CNP

## 2019-04-01 NOTE — LETTER
4/1/2019        RE: Edilberto Brooks  [address]  [place] [address]        [facility]  [place] Medical Record Number:  [id number]  [facility] where encounter took place[facility] [[id number]]  Chief Complaint   Patient presents with     RECHECK       HPI:    Edilberto Brooks  is a 48 year old (1970), who is being seen today for an episodic care visit.  HPI information obtained from: facility chart records, facility staff, patient report and [facility] chart review.   He came to this facility 3/18/2019 for short term rehab and medical management following hospitalization after presenting to the ED with chest pain. He had elevated troponin and angiogram showed severe multivessel coronary disease. He underwent CABG X 4 by Dr Rincon on 3/12/2019.  He was diuresed. Statin was started. Beta blocker was not started due to asymptomatic bradycardia with sleep. EP consulted and felt he has undiagnosed sleep apnea. Outpatient sleep study was recommended. Ortho consulted for acute gout of right ankle, treated with prednisone and colchicine.     Today's concern is:     Acute gout of right ankle, unspecified cause-he was scheduled to discharge home today but has developed severe pain of his right ankle, presumable due to gout, and is unable to ambulate. Symptoms began 3/30/2019 and colchicine was started. He reports no improvement and has not been out of bed for 3 days.   Coronary artery disease involving native heart without angina pectoris, unspecified vessel or lesion type  S/P CABG x 4-denies cough, shortness of breath or chest pain. Using tramadol and gabapentin with some improvement in surgical pain.   History of non-ST elevation myocardial infarction (NSTEMI)  Bilateral leg edema-lasix started last week for increasing LE edema. Weight is actually up 8 lbs since lasix was started. LE edema has improved since he's been staying in bed. He has not been  following a low sodium heart healthy diet.   Morbid obesity (H)  Essential hypertension-BPs: 132/78, 106/74, 116/70   HR: 78-89  Physical deconditioning-prior to his right ankle pain, he was ambulating unlimited distances and independent with cares.     Past Medical and Surgical History reviewed in Epic today.    MEDICATIONS:  Current Outpatient Medications   Medication Sig Dispense Refill     acetaminophen (TYLENOL) 500 MG tablet Take 1,000 mg by mouth 3 times daily And Daily PRN       amLODIPine (NORVASC) 10 MG tablet Take 10 mg by mouth daily       aspirin (ASA) 325 MG EC tablet Take 325 mg by mouth daily       colchicine (COLCYRS) 0.6 MG tablet Take by mouth daily On 3/30 take 1.2 mg PO x 1, then 0.6 mg one hour later  Starting 3/31-4/3 take 0.6 mg PO BID, then discontinue med       furosemide (LASIX) 20 MG tablet Take 20 mg by mouth daily        gabapentin (NEURONTIN) 300 MG capsule Take 1 capsule (300 mg) by mouth 3 times daily 42 capsule 0     guaiFENesin (ROBITUSSIN) 20 mg/mL SOLN solution Take 10 mLs by mouth every 4 hours as needed for cough 1000 mL 0     levothyroxine (SYNTHROID/LEVOTHROID) 50 MCG tablet Take 50 mcg by mouth daily       losartan (COZAAR) 25 MG tablet Take 25 mg by mouth daily       methocarbamol (ROBAXIN) 750 MG tablet Take 1 tablet (750 mg) by mouth 4 times daily as needed for muscle spasms 120 tablet 1     pantoprazole (PROTONIX) 40 MG EC tablet Take 1 tablet (40 mg) by mouth every morning (before breakfast) 30 tablet 0     polyethylene glycol (MIRALAX/GLYCOLAX) packet Take 17 g by mouth daily as needed for constipation       potassium chloride ER (K-DUR/KLOR-CON M) 20 MEQ CR tablet Take 20 mEq by mouth daily        predniSONE (DELTASONE) 20 MG tablet Take 20 mg by mouth daily.       rosuvastatin (CRESTOR) 20 MG tablet Take 1 tablet (20 mg) by mouth daily 30 tablet 3     senna-docusate (SENOKOT-S/PERICOLACE) 8.6-50 MG tablet Take 1 tablet by mouth 2 times daily as needed for  "constipation 60 tablet 0     traMADol (ULTRAM) 50 MG tablet Take 1 tablet (50 mg) by mouth every 6 hours as needed for moderate pain 50 tablet 0       REVIEW OF SYSTEMS:  10 point ROS of systems including Constitutional, Eyes, Respiratory, Cardiovascular, Gastroenterology, Genitourinary, Integumentary, Musculoskeletal, Psychiatric were all negative except for pertinent positives noted in my HPI.    Objective:  /86   Pulse 89   Temp 98.2  F (36.8  C)   Resp 17   Ht 1.905 m (6' 3\")   Wt 147.9 kg (326 lb)   SpO2 98%   BMI 40.75 kg/m     Exam:  GENERAL APPEARANCE:  Alert, in no distress, morbidly obese  ENT:  Mouth and posterior oropharynx normal, moist mucous membranes, normal hearing acuity  EYES:  EOM normal, conjunctiva and lids normal  NECK:  No adenopathy,masses or thyromegaly  RESP:  respiratory effort and palpation of chest normal, lungs clear to auscultation , no respiratory distress  CV:  Palpation and auscultation of heart done , regular rate and rhythm, no murmur, rub, or gallop, +2 pedal pulses, peripheral edema 1+ in both LE  ABDOMEN:  soft, non-tender, no distension, no masses  M/S:   in bed. MART with good strength. Mild edema both feet, no erythema or warmth of right ankle, full ROM  SKIN:  no rashes or open areas  PSYCH:  oriented X 3, normal insight, judgement and memory, affect and mood normal    Labs:   Lab Results   Component Value Date    WBC 10.2 03/15/2019     Lab Results   Component Value Date    RBC 3.39 03/15/2019     Lab Results   Component Value Date    HGB 11.3 03/21/2019     Lab Results   Component Value Date    HCT 31.2 03/15/2019     Lab Results   Component Value Date    MCV 92 03/15/2019     Lab Results   Component Value Date    MCH 30.7 03/15/2019     Lab Results   Component Value Date    MCHC 33.3 03/15/2019     Lab Results   Component Value Date    RDW 13.7 03/15/2019     Lab Results   Component Value Date     03/16/2019     Last Comprehensive Metabolic " Panel:  Sodium   Date Value Ref Range Status   03/28/2019 141 133 - 144 mmol/L Final     Potassium   Date Value Ref Range Status   03/28/2019 3.8 3.4 - 5.3 mmol/L Final     Chloride   Date Value Ref Range Status   03/28/2019 106 94 - 109 mmol/L Final     Carbon Dioxide   Date Value Ref Range Status   03/28/2019 29 20 - 32 mmol/L Final     Anion Gap   Date Value Ref Range Status   03/28/2019 6 3 - 14 mmol/L Final     Glucose   Date Value Ref Range Status   03/28/2019 92 70 - 99 mg/dL Final     Urea Nitrogen   Date Value Ref Range Status   03/28/2019 17 7 - 30 mg/dL Final     Creatinine   Date Value Ref Range Status   03/28/2019 0.97 0.66 - 1.25 mg/dL Final     GFR Estimate   Date Value Ref Range Status   03/28/2019 >90 >60 mL/min/[1.73_m2] Final     Comment:     Non  GFR Calc  Starting 12/18/2018, serum creatinine based estimated GFR (eGFR) will be   calculated using the Chronic Kidney Disease Epidemiology Collaboration   (CKD-EPI) equation.       Calcium   Date Value Ref Range Status   03/28/2019 8.7 8.5 - 10.1 mg/dL Final         ASSESSMENT / PLAN:  (M10.9) Acute gout of right ankle, unspecified cause  (primary encounter diagnosis)  Comment: no improvement with colchicine  Plan: prednisone 20 mg daily X 3 days. Continue colchicine as ordered.     (I25.10) Coronary artery disease involving native heart without angina pectoris, unspecified vessel or lesion type  (Z95.1) S/P CABG x 4  (I25.2) History of non-ST elevation myocardial infarction (NSTEMI)  Comment: incision healing without signs of infection. Pain controlled  Plan: continue ASA, statin, losartan. Continue tramadol, gabapentin, tylenol, methocarbamol. Cardiology follow up as scheduled.     (R60.0) Bilateral leg edema  (E66.01) Morbid obesity (H)  Comment: LE edema improved, although weight is up 8 lbs. Weight gain may not be all fluid related. No other s/s of volume overload.   Plan: increase lasix to 20 mg bid for 3 days, then back to 20 mg  daily. Compression stockings during the day. Low sodium, heart healthy diet. BMP    (I10) Essential hypertension  Comment: controlled  Plan: continue lasix, amlodipine, losartan. Monitor VS    (R53.81) Physical deconditioning  Comment: was making good progress in therapies until gout flare  Plan: strongly encouraged him to increase his activity level and get out of bed.       Electronically signed by:  KEATON Haji CNP            Sincerely,        KEATON Haji CNP

## 2019-04-08 ENCOUNTER — TELEPHONE (OUTPATIENT)
Dept: CARDIOLOGY | Facility: CLINIC | Age: 49
End: 2019-04-08

## 2019-04-08 NOTE — TELEPHONE ENCOUNTER
Voicemail received from Mary with  Homecare requesting orders for skilled nursing, PT and OT for patient. Per Mary, patient's PCP declines to order homecare as he has not seen him in over two years and without an order he will no longer be able to receive homecare service. Mary notes he is unable to drive at this time. Pt was recently hospitalized for NSTEMI, CABGx4 and discharged 3/18/19. Pt will establish care Dr Hughes on 4/22/19.     Call made to Lisa RIVAS with CT surgery to see if they would consider ordering homecare/PT/OT, awaiting reply.    Mary RN w/  [facility]: [phone]    Addendum 4/9/19: Roxi Dennis NP with [facility] spoke to Mary to decline request for homecare PT/OT. See separate encounter note.

## 2019-04-08 NOTE — TELEPHONE ENCOUNTER
Received a call by [facility] care requesting a home care order for patient. I spoke with Mary home care RN to ask how initial visit was. She denied any immediate concerns for patient requiring specific home care needs.  Unable to reach patient. I did speak with his sister Karla who states that the patient is progressing very well.  Because patient would greatly benefit from outpatient cardiac rehab vs. home care PT/OT, I am not going to order home care at this time. I have advised patient's sister Karla of this and left a message for patient that he should call cardiac rehab and arrange for outpatient sessions.  Our contact information was left via voicemail if patient has any other questions or concerns.    AMA Charles, CCRN-CSC  Pager: [phone]

## 2019-04-16 ENCOUNTER — HOSPITAL ENCOUNTER (OUTPATIENT)
Dept: CARDIAC REHAB | Facility: CLINIC | Age: 49
End: 2019-04-16
Attending: STUDENT IN AN ORGANIZED HEALTH CARE EDUCATION/TRAINING PROGRAM
Payer: COMMERCIAL

## 2019-04-16 DIAGNOSIS — I25.10 CAD IN NATIVE ARTERY: ICD-10-CM

## 2019-04-16 PROCEDURE — 40000575 ZZH STATISTIC OP CARDIAC VISIT #2: Performed by: OCCUPATIONAL THERAPIST

## 2019-04-16 PROCEDURE — 93798 PHYS/QHP OP CAR RHAB W/ECG: CPT | Performed by: OCCUPATIONAL THERAPIST

## 2019-04-16 PROCEDURE — 40000116 ZZH STATISTIC OP CR VISIT: Performed by: OCCUPATIONAL THERAPIST

## 2019-04-16 PROCEDURE — 93797 PHYS/QHP OP CAR RHAB WO ECG: CPT | Performed by: OCCUPATIONAL THERAPIST

## 2019-04-17 ENCOUNTER — HOSPITAL ENCOUNTER (OUTPATIENT)
Dept: CARDIAC REHAB | Facility: CLINIC | Age: 49
End: 2019-04-17
Attending: STUDENT IN AN ORGANIZED HEALTH CARE EDUCATION/TRAINING PROGRAM
Payer: COMMERCIAL

## 2019-04-17 PROCEDURE — 93798 PHYS/QHP OP CAR RHAB W/ECG: CPT | Performed by: REHABILITATION PRACTITIONER

## 2019-04-17 PROCEDURE — 40000116 ZZH STATISTIC OP CR VISIT: Performed by: REHABILITATION PRACTITIONER

## 2019-04-19 ENCOUNTER — HOSPITAL ENCOUNTER (OUTPATIENT)
Dept: CARDIAC REHAB | Facility: CLINIC | Age: 49
End: 2019-04-19
Attending: STUDENT IN AN ORGANIZED HEALTH CARE EDUCATION/TRAINING PROGRAM
Payer: COMMERCIAL

## 2019-04-19 PROCEDURE — 40000116 ZZH STATISTIC OP CR VISIT: Performed by: OCCUPATIONAL THERAPIST

## 2019-04-19 PROCEDURE — 93798 PHYS/QHP OP CAR RHAB W/ECG: CPT | Performed by: OCCUPATIONAL THERAPIST

## 2019-04-22 ENCOUNTER — OFFICE VISIT (OUTPATIENT)
Dept: CARDIOLOGY | Facility: CLINIC | Age: 49
End: 2019-04-22
Payer: COMMERCIAL

## 2019-04-22 ENCOUNTER — HOSPITAL ENCOUNTER (OUTPATIENT)
Dept: GENERAL RADIOLOGY | Facility: CLINIC | Age: 49
Discharge: HOME | End: 2019-04-22
Attending: INTERNAL MEDICINE | Admitting: INTERNAL MEDICINE
Payer: COMMERCIAL

## 2019-04-22 VITALS
BODY MASS INDEX: 40.43 KG/M2 | HEIGHT: 74 IN | SYSTOLIC BLOOD PRESSURE: 110 MMHG | DIASTOLIC BLOOD PRESSURE: 80 MMHG | WEIGHT: 315 LBS | HEART RATE: 76 BPM | OXYGEN SATURATION: 97 %

## 2019-04-22 DIAGNOSIS — Z95.1 S/P CABG (CORONARY ARTERY BYPASS GRAFT): ICD-10-CM

## 2019-04-22 DIAGNOSIS — I25.10 CORONARY ARTERY DISEASE INVOLVING NATIVE CORONARY ARTERY OF NATIVE HEART WITHOUT ANGINA PECTORIS: Primary | ICD-10-CM

## 2019-04-22 DIAGNOSIS — R06.83 SNORING: ICD-10-CM

## 2019-04-22 DIAGNOSIS — I25.10 CORONARY ARTERY DISEASE INVOLVING NATIVE CORONARY ARTERY OF NATIVE HEART WITHOUT ANGINA PECTORIS: ICD-10-CM

## 2019-04-22 DIAGNOSIS — Z95.1 STATUS POST CORONARY ARTERY BYPASS GRAFT: ICD-10-CM

## 2019-04-22 DIAGNOSIS — I21.4 NSTEMI (NON-ST ELEVATED MYOCARDIAL INFARCTION) (H): ICD-10-CM

## 2019-04-22 DIAGNOSIS — I10 BENIGN ESSENTIAL HYPERTENSION: ICD-10-CM

## 2019-04-22 LAB
ALBUMIN SERPL-MCNC: 3.7 G/DL (ref 3.4–5)
ALP SERPL-CCNC: 108 U/L (ref 40–150)
ALT SERPL W P-5'-P-CCNC: 23 U/L (ref 0–70)
ANION GAP SERPL CALCULATED.3IONS-SCNC: 9 MMOL/L (ref 3–14)
AST SERPL W P-5'-P-CCNC: 16 U/L (ref 0–45)
BASOPHILS # BLD AUTO: 0.1 10E9/L (ref 0–0.2)
BASOPHILS NFR BLD AUTO: 0.6 %
BILIRUB SERPL-MCNC: 0.4 MG/DL (ref 0.2–1.3)
BUN SERPL-MCNC: 20 MG/DL (ref 7–30)
CALCIUM SERPL-MCNC: 9.2 MG/DL (ref 8.5–10.1)
CHLORIDE SERPL-SCNC: 107 MMOL/L (ref 94–109)
CO2 SERPL-SCNC: 25 MMOL/L (ref 20–32)
CREAT SERPL-MCNC: 1.06 MG/DL (ref 0.66–1.25)
DIFFERENTIAL METHOD BLD: ABNORMAL
EOSINOPHIL # BLD AUTO: 0.7 10E9/L (ref 0–0.7)
EOSINOPHIL NFR BLD AUTO: 8.5 %
ERYTHROCYTE [DISTWIDTH] IN BLOOD BY AUTOMATED COUNT: 13.6 % (ref 10–15)
GFR SERPL CREATININE-BSD FRML MDRD: 83 ML/MIN/{1.73_M2}
GLUCOSE SERPL-MCNC: 102 MG/DL (ref 70–99)
HCT VFR BLD AUTO: 39.5 % (ref 40–53)
HGB BLD-MCNC: 12.7 G/DL (ref 13.3–17.7)
IMM GRANULOCYTES # BLD: 0 10E9/L (ref 0–0.4)
IMM GRANULOCYTES NFR BLD: 0.1 %
LYMPHOCYTES # BLD AUTO: 1.9 10E9/L (ref 0.8–5.3)
LYMPHOCYTES NFR BLD AUTO: 23.5 %
MCH RBC QN AUTO: 29.7 PG (ref 26.5–33)
MCHC RBC AUTO-ENTMCNC: 32.2 G/DL (ref 31.5–36.5)
MCV RBC AUTO: 93 FL (ref 78–100)
MONOCYTES # BLD AUTO: 0.7 10E9/L (ref 0–1.3)
MONOCYTES NFR BLD AUTO: 8.7 %
NEUTROPHILS # BLD AUTO: 4.8 10E9/L (ref 1.6–8.3)
NEUTROPHILS NFR BLD AUTO: 58.6 %
NRBC # BLD AUTO: 0 10*3/UL
NRBC BLD AUTO-RTO: 0 /100
PLATELET # BLD AUTO: 339 10E9/L (ref 150–450)
POTASSIUM SERPL-SCNC: 3.9 MMOL/L (ref 3.4–5.3)
PROT SERPL-MCNC: 7.5 G/DL (ref 6.8–8.8)
RBC # BLD AUTO: 4.27 10E12/L (ref 4.4–5.9)
SODIUM SERPL-SCNC: 141 MMOL/L (ref 133–144)
WBC # BLD AUTO: 8.2 10E9/L (ref 4–11)

## 2019-04-22 PROCEDURE — 80053 COMPREHEN METABOLIC PANEL: CPT | Performed by: INTERNAL MEDICINE

## 2019-04-22 PROCEDURE — 36415 COLL VENOUS BLD VENIPUNCTURE: CPT | Performed by: INTERNAL MEDICINE

## 2019-04-22 PROCEDURE — 99214 OFFICE O/P EST MOD 30 MIN: CPT | Performed by: INTERNAL MEDICINE

## 2019-04-22 PROCEDURE — 93000 ELECTROCARDIOGRAM COMPLETE: CPT | Performed by: INTERNAL MEDICINE

## 2019-04-22 PROCEDURE — 85025 COMPLETE CBC W/AUTO DIFF WBC: CPT | Performed by: INTERNAL MEDICINE

## 2019-04-22 PROCEDURE — 71046 X-RAY EXAM CHEST 2 VIEWS: CPT

## 2019-04-22 RX ORDER — ASPIRIN 81 MG/1
81 TABLET, CHEWABLE ORAL DAILY
Qty: 100 TABLET | Refills: 3 | COMMUNITY
Start: 2019-04-22

## 2019-04-22 RX ORDER — HYDROCHLOROTHIAZIDE 25 MG/1
25 TABLET ORAL EVERY MORNING
Qty: 100 TABLET | Refills: 3 | Status: SHIPPED | OUTPATIENT
Start: 2019-04-22

## 2019-04-22 SDOH — HEALTH STABILITY: MENTAL HEALTH: HOW OFTEN DO YOU HAVE A DRINK CONTAINING ALCOHOL?: MONTHLY OR LESS

## 2019-04-22 ASSESSMENT — MIFFLIN-ST. JEOR: SCORE: 2433.45

## 2019-04-22 NOTE — PROGRESS NOTES
Clinic visit note dictated. Dictation reference number - [id number]          REVIEW OF SYSTEMS:  A comprehensive 10-point review of systems was completed and the pertinent positives are documented in the history of present illness.    Skin:  Negative     Eyes:  Negative    ENT:  Negative    Respiratory:  Negative    Cardiovascular:  Negative    Gastroenterology: Negative    Genitourinary:  Negative    Musculoskeletal:  Positive for gout  Neurologic:  Negative    Psychiatric:  Negative    Heme/Lymph/Imm:  Negative    Endocrine:  Negative      CURRENT MEDICATIONS:  Current Outpatient Medications   Medication Sig Dispense Refill     acetaminophen (TYLENOL) 500 MG tablet Take 1,000 mg by mouth 3 times daily And Daily PRN       amLODIPine (NORVASC) 10 MG tablet Take 10 mg by mouth daily       aspirin (ASA) 81 MG chewable tablet Take 1 tablet (81 mg) by mouth daily 100 tablet 3     gabapentin (NEURONTIN) 300 MG capsule Take 1 capsule (300 mg) by mouth 3 times daily 42 capsule 0     hydrochlorothiazide (HYDRODIURIL) 25 MG tablet Take 1 tablet (25 mg) by mouth every morning 100 tablet 3     levothyroxine (SYNTHROID/LEVOTHROID) 50 MCG tablet Take 50 mcg by mouth daily       losartan (COZAAR) 25 MG tablet Take 25 mg by mouth daily       methocarbamol (ROBAXIN) 750 MG tablet Take 1 tablet (750 mg) by mouth 4 times daily as needed for muscle spasms 120 tablet 1     pantoprazole (PROTONIX) 40 MG EC tablet Take 1 tablet (40 mg) by mouth every morning (before breakfast) (Patient taking differently: Take 20 mg by mouth every morning (before breakfast) ) 30 tablet 0     rosuvastatin (CRESTOR) 20 MG tablet Take 1 tablet (20 mg) by mouth daily 30 tablet 3     traMADol (ULTRAM) 50 MG tablet Take 1 tablet (50 mg) by mouth every 6 hours as needed for moderate pain 50 tablet 0     guaiFENesin (ROBITUSSIN) 20 mg/mL SOLN solution Take 10 mLs by mouth every 4 hours as needed for cough (Patient not taking: Reported on 4/22/2019) 1000 mL 0      polyethylene glycol (MIRALAX/GLYCOLAX) packet Take 17 g by mouth daily as needed for constipation       senna-docusate (SENOKOT-S/PERICOLACE) 8.6-50 MG tablet Take 1 tablet by mouth 2 times daily as needed for constipation 60 tablet 0         ALLERGIES:  Allergies   Allergen Reactions     Codeine      Hypotension/ Dizziness          PAST MEDICAL HISTORY:    Past Medical History:   Diagnosis Date     Coronary artery disease involving native coronary artery of native heart without angina pectoris 03/12/2019    NSTEMI, s/p CABG 3/12/2019     Hypercholesteremia      Hypertension      Obesity        PAST SURGICAL HISTORY:    Past Surgical History:   Procedure Laterality Date     BYPASS GRAFT ARTERY CORONARY N/A 3/12/2019    Procedure: CORONARY ARTERY BYPASS GRAFTING x 4 (LIMA - LAD; SV - OM; SV - PDA; SV - DIAGONAL) WITH ENDOSCOPIC VEIN HARVEST ON THE LEFT LOWER EXTREMITY    (ON PUMP OXYGENATOR ; NANCY BY BRENNEN);  Surgeon: Mike Rincon MD;  Location:  OR     CV HEART CATHETERIZATION WITH POSSIBLE INTERVENTION N/A 3/7/2019    Procedure: Heart Catheterization with possible Intervention;  Surgeon: Drew Logan MD;  Location:  HEART CARDIAC CATH LAB     FRACTURE SURGERY  1997    left leg        FAMILY HISTORY:    Family History   Problem Relation Age of Onset     Heart Disease Mother 68        MI in 60's     Breast Cancer Mother      Pacemaker Father      Colon Cancer Father 70     Myocardial Infarction Father 70     No Known Problems Sister      Diabetes No family hx of        SOCIAL HISTORY:    Social History     Socioeconomic History     Marital status:      Spouse name: None     Number of children: None     Years of education: None     Highest education level: None   Occupational History     Occupation:    Social Needs     Financial resource strain: None     Food insecurity:     Worry: None     Inability: None     Transportation needs:     Medical: None     Non-medical: None   Tobacco  "Use     Smoking status: Never Smoker     Smokeless tobacco: Never Used   Substance and Sexual Activity     Alcohol use: Yes     Frequency: Monthly or less     Comment: social      Drug use: No     Sexual activity: Yes     Partners: Female   Lifestyle     Physical activity:     Days per week: None     Minutes per session: None     Stress: None   Relationships     Social connections:     Talks on phone: None     Gets together: None     Attends [employer] service: None     Active member of club or organization: None     Attends meetings of clubs or organizations: None     Relationship status: None     Intimate partner violence:     Fear of current or ex partner: None     Emotionally abused: None     Physically abused: None     Forced sexual activity: None   Other Topics Concern     None   Social History Narrative     None       PHYSICAL EXAM:    Vitals: /80   Pulse 76   Ht 1.88 m (6' 2\")   Wt 149.4 kg (329 lb 4.8 oz)   SpO2 97%   BMI 42.28 kg/m    Wt Readings from Last 5 Encounters:   04/22/19 149.4 kg (329 lb 4.8 oz)   04/01/19 147.9 kg (326 lb)   03/30/19 148.2 kg (326 lb 12.8 oz)   03/29/19 146.1 kg (322 lb)   03/25/19 99.1 kg (218 lb 6.4 oz)       "

## 2019-04-22 NOTE — LETTER
4/22/2019      [facility]  [address]  [facility] [address]      RE: Edilberto Brooks       Dear Colleague,    I had the pleasure of seeing Edilberto Brooks in the [facility].    Service Date: 04/22/2019      PRIMARY CARE PROVIDER:  [facility], [place], [place]      REASON FOR VISIT:  Followup after recent coronary artery bypass grafting done on 03/12/2019.      HISTORY OF PRESENT ILLNESS:  Thom Thomas is new to my practice.  He was recently hospitalized in 03/2019 with chest pain and elevated troponin I of 1.8, diagnosed with a non-STEMI and found to have severe triple-vessel coronary artery disease with preserved LVEF of 55%-60%.  He has mild aortic root dilatation of 4.0 cm.  His risk factors include hyperlipidemia (was taking atorvastatin 80 mg daily) and hypertension (managed on losartan/hydrochlorothiazide, amlodipine and metoprolol).  He is morbidly obese with a BMI of 42 kg/m2.  There is potentially a family history of coronary artery disease in both parents, but details are not specified.  Although his previous notes suggest he used to smoke a pack of cigarettes daily, the patient categorically denies this.        On 03/20/2019, he underwent CABG x4 by Dr. Reid ([place] to LAD, vein grafts to obtuse marginal, PDA and diagonal).  His postoperative course was unremarkable, but he was in the hospital for almost 12 days (03/06/2019 to 03/18/2019, mainly for physical deconditioning.  He did have episodes of asymptomatic *** associated with sleep, was seen by Dr. Jean Baptiste from Electrophysiology and beta blocker discontinued.  He has not had any awake presyncopal/syncopal episodes.  [facility].        The patient is doing well.  He has expected sternal wound tenderness without discharge.  He is participating in Cardiac Rehab.  He lives alone, works as an , but has been off work on short-term disability following  his bypass surgery.  Rare alcohol, denies any tobacco use.  Overall, progressing well.      DIAGNOSTIC DATA:  Sodium 141, potassium 3.9, BUN 20, creatinine 1.0, estimated GFR 83.  Total cholesterol 229, HDL 37, , triglycerides 201 (on 20 mg of rosuvastatin).  Hemoglobin 12.7.      PHYSICAL EXAMINATION:     VITAL SIGNS:  /80, pulse 76 per minute, height 1.8 meters (6 feet 2 inches), weight 149 kg (329 pounds), BMI 42 kg/m2.     Pertinent for sternal wound healing well without any discharge.  Expected tenderness.  There is a superficial skin *** scabbing well.  Physical exam is limited by very high BMI.  Apical impulse not palpable.  Heart sounds are regular.  No murmur or pericardial friction rub.  Lungs are clear without clinical evidence of pleural effusion.  Large abdominal pannus, soft and nontender.  There is 1+ pitting edema of both ankles.  Mentally alert and oriented.      DIAGNOSES:   1.  Status post successful CABG x4 (LIMA to LAD and vein graft to OM, PDA and diagonal) on 03/12/2019.   2.  Hypertension, optimally treated.   3.  Hyperlipidemia (LDL not at goal) 152 on 20 mg of rosuvastatin).      4.  Obesity with a BMI of 42.2 kg/m2.      ECG done today shows sinus rhythm with low voltage (high BMI).  No Q-waves.  Sinus rate 70 BPM,  milliseconds, QTc 389 milliseconds.      Transthoracic echocardiogram before surgery shows normal LV size, LVEF 55%-60%, no wall motion abnormalities, mild ascending aorta dilatation.  Aortic root dilatation of 4.0 cm.  Normal ascending aorta of 3.3 cm.  Aortic valve not well visualized, trace aortic valve regurgitation.      Carotid ultrasound shows less than 50% stenosis of both internal carotid arteries.      ASSESSMENT AND PLAN:  The patient is progressing as well as could be expected after his recent cardiac bypass surgery 6 weeks ago.  He is normotensive on current therapy, his sternal wound is healing well, and he is participating in Cardiac Rehab.   His LDL is not at goal at 152 on 20 mg of rosuvastatin.  I note he was switched from 80 mg of atorvastatin at his recent hospitalization.  I am just going to watch it for now.  Hopefully, with Cardiac Rehab and weight loss, this will improve further.  He has a history of snoring and suggestive body habitus and should be screened for sleep apnea.   1.  Medication changes.  Decrease aspirin from 325 mg to 81 mg daily, stop potassium supplement 20 mEq daily, stop furosemide 20 mg daily, restart previous established hydrochlorothiazide 25 mg daily.   2.  ***.   3.  He can return to full-time work.  He works as an  for [employer].   4.  Follow up in my clinic in 6 months with previsit labs and transthoracic echocardiogram.        cc:   [email or web address]   [address] [place] [id number]         [facility] JOAN ORR MD             D: 2019   T: 2019   MT:       Name:     SALOMON DELGADO   MRN:      [id number]        Account:      [id number]   :      1970           Service Date: 2019      Document: [id number]         Outpatient Encounter Medications as of 2019   Medication Sig Dispense Refill     acetaminophen (TYLENOL) 500 MG tablet Take 1,000 mg by mouth 3 times daily And Daily PRN       amLODIPine (NORVASC) 10 MG tablet Take 10 mg by mouth daily       aspirin (ASA) 81 MG chewable tablet Take 1 tablet (81 mg) by mouth daily 100 tablet 3     gabapentin (NEURONTIN) 300 MG capsule Take 1 capsule (300 mg) by mouth 3 times daily 42 capsule 0     hydrochlorothiazide (HYDRODIURIL) 25 MG tablet Take 1 tablet (25 mg) by mouth every morning 100 tablet 3     levothyroxine (SYNTHROID/LEVOTHROID) 50 MCG tablet Take 50 mcg by mouth daily       losartan (COZAAR) 25 MG tablet Take 25 mg by mouth daily       methocarbamol (ROBAXIN) 750 MG tablet Take 1 tablet (750 mg) by mouth 4 times daily as needed for muscle spasms 120 tablet 1     pantoprazole (PROTONIX) 40 MG EC tablet Take 1 tablet (40 mg)  by mouth every morning (before breakfast) (Patient taking differently: Take 20 mg by mouth every morning (before breakfast) ) 30 tablet 0     rosuvastatin (CRESTOR) 20 MG tablet Take 1 tablet (20 mg) by mouth daily 30 tablet 3     traMADol (ULTRAM) 50 MG tablet Take 1 tablet (50 mg) by mouth every 6 hours as needed for moderate pain 50 tablet 0     [] colchicine (COLCYRS) 0.6 MG tablet Take by mouth daily On 3/30 take 1.2 mg PO x 1, then 0.6 mg one hour later  Starting 3/31-4/3 take 0.6 mg PO BID, then discontinue med       guaiFENesin (ROBITUSSIN) 20 mg/mL SOLN solution Take 10 mLs by mouth every 4 hours as needed for cough (Patient not taking: Reported on 2019) 1000 mL 0     polyethylene glycol (MIRALAX/GLYCOLAX) packet Take 17 g by mouth daily as needed for constipation       [] predniSONE (DELTASONE) 20 MG tablet Take 20 mg by mouth daily.       senna-docusate (SENOKOT-S/PERICOLACE) 8.6-50 MG tablet Take 1 tablet by mouth 2 times daily as needed for constipation 60 tablet 0     [DISCONTINUED] aspirin (ASA) 325 MG EC tablet Take 325 mg by mouth daily       [DISCONTINUED] furosemide (LASIX) 20 MG tablet Take 20 mg by mouth daily        [DISCONTINUED] potassium chloride ER (K-DUR/KLOR-CON M) 20 MEQ CR tablet Take 20 mEq by mouth daily        No facility-administered encounter medications on file as of 2019.        Again, thank you for allowing me to participate in the care of your patient.      Sincerely,    Octavio Hughes MD     [facility]

## 2019-04-22 NOTE — PATIENT INSTRUCTIONS
MEDICATION CHANGES:  1.  Decrease aspirin from 325 mg to 81 mg daily (over-the-counter).  2.  Stop the potassium supplement.  3.  Stop furosemide 20 mg daily.  4.  Restart hydrochlorothiazide 25 mg.  Take 1 tablet in the morning.  New prescription completed.    ADDITIONAL TESTING AND FOLLOW-UP:  1.  Sleep clinic referral in approximately a month.  2.  Continue cardiac rehabilitation exercise program.  Adding daily walking.  3.  You can return to full-time work.  4.  Follow-up in my clinic in 6 months.  You will require fasting blood tests and a heart ultrasound before your return visit.    If you have any questions or concerns, please contact my nurses at [phone].

## 2019-04-22 NOTE — LETTER
4/22/2019      [facility]  [address]  [facility] [address]      RE: Edilberto Brooks       Dear Colleague,    I had the pleasure of seeing Edilberto Brooks in the [facility].    Service Date: 04/22/2019      PRIMARY CARE PROVIDER:  [facility], [place], [place]      REASON FOR VISIT:  Followup after recent coronary artery bypass grafting done on 03/12/2019.      HISTORY OF PRESENT ILLNESS:    Thom Thomas is new to my practice.  He was recently hospitalized in 03/2019 with chest pain and elevated troponin I of 1.8, diagnosed with a non-STEMI and found to have severe triple-vessel coronary artery disease with preserved LVEF of 55%-60%.  He has mild aortic root dilatation of 4.0 cm.  His risk factors include morbid obesity, hyperlipidemia (was taking atorvastatin 80 mg daily) and hypertension (managed on losartan/hydrochlorothiazide, amlodipine and metoprolol).  He is morbidly obese with a BMI of 42 kg/m2.  There is potentially a family history of coronary artery disease in both parents, but details are not specified.  Although his previous notes suggest he used to smoke a pack of cigarettes daily, the patient categorically denies this.        On 03/20/2019, he underwent CABG x 4 by Dr. Mike Rincon (LIMA to LAD, vein grafts to obtuse marginal, PDA and diagonal).  Post-operatively, have episodes of asymptomatic asystole/bradycardia associated with sleep, was seen by Dr. Jean Baptiste from Electrophysiology and beta blocker discontinued.  He has not had any awake presyncopal/syncopal episodes.  [facility].        The patient is doing well.  He has expected sternal wound tenderness without discharge.  He is participating in Cardiac Rehab.  He lives alone, works as an , but has been off work on short-term disability following his bypass surgery.  Rare alcohol, denies any tobacco use (although hospital notes state  otherwise).       DIAGNOSTIC DATA:    Labs: Sodium 141, potassium 3.9, BUN 20, creatinine 1.0, estimated GFR 83.  Total cholesterol 229, HDL 37, , triglycerides 201 (on 20 mg of rosuvastatin).  Hemoglobin 12.7.     ECG done today shows sinus rhythm with low voltage (high BMI).  No Q-waves.  Sinus rate 70 BPM,  milliseconds, QTc 389 milliseconds.      Transthoracic echocardiogram before surgery shows normal LV size, LVEF 55%-60%, no wall motion abnormalities, mild ascending aorta dilatation.  Aortic root dilatation of 4.0 cm.  Normal ascending aorta of 3.3 cm.  Aortic valve not well visualized, trace aortic valve regurgitation.      Carotid ultrasound shows less than 50% stenosis of both internal carotid arteries.     PHYSICAL EXAMINATION:     VITAL SIGNS:  /80, pulse 76 per minute, height 1.8 meters (6 feet 2 inches), weight 149 kg (329 pounds), BMI 42 kg/m2.     Physical exam is limited by very high BMI.  Sternal wound healing well without any discharge.  Expected tenderness, healing scabs.  Apical impulse not palpable.  Heart sounds are regular.  No murmur or pericardial friction rub.  Lungs are clear without clinical evidence of pleural effusion.  Large abdominal pannus, soft and nontender.  There is 1+ pitting edema of both ankles.  Mentally alert and oriented.      DIAGNOSES:   1.  Status post successful CABG x4 (LIMA to LAD and vein graft to OM, PDA and diagonal) on 03/12/2019.   2.  Hypertension, optimally treated.   3.  Hyperlipidemia (LDL not at goal - 152 on 20 mg of rosuvastatin).      4.  Obesity with a BMI of 42.2 kg/m2      ASSESSMENT AND PLAN:    Mechelle is progressing after his recent cardiac bypass surgery 6 weeks ago.  He is normotensive on current therapy, his sternal wound is healing well, and he is participating in Cardiac Rehab.  His LDL is not at goal at 152 on 20 mg of rosuvastatin.  I note he was switched from 80 mg of atorvastatin at his recent hospitalization.  I am just  going to watch it for now.  Hopefully, with Cardiac Rehab and weight loss, this will improve further.  He has a history of snoring and suggestive body habitus and should be screened for sleep apnea.     1.  Medication changes.  Decrease aspirin from 325 mg to 81 mg daily, stop potassium supplement 20 mEq daily, stop furosemide 20 mg daily, restart previous established hydrochlorothiazide 25 mg daily.   2.  He can return to full-time work.  He works as an  for [employer].   3.  Follow up in my clinic in 6 months with previsit labs and transthoracic echocardiogram.        cc:   [facility]   [address] [place] [address]         [facility] JOAN ORR MD             D: 2019   T: 2019   MT: BJORN      Name:     SALOMON DELGADO   MRN:      [id number]        Account:      [id number]   :      1970           Service Date: 2019      Document: [id number]           Outpatient Encounter Medications as of 2019   Medication Sig Dispense Refill     acetaminophen (TYLENOL) 500 MG tablet Take 1,000 mg by mouth 3 times daily And Daily PRN       amLODIPine (NORVASC) 10 MG tablet Take 10 mg by mouth daily       aspirin (ASA) 81 MG chewable tablet Take 1 tablet (81 mg) by mouth daily 100 tablet 3     gabapentin (NEURONTIN) 300 MG capsule Take 1 capsule (300 mg) by mouth 3 times daily 42 capsule 0     hydrochlorothiazide (HYDRODIURIL) 25 MG tablet Take 1 tablet (25 mg) by mouth every morning 100 tablet 3     levothyroxine (SYNTHROID/LEVOTHROID) 50 MCG tablet Take 50 mcg by mouth daily       losartan (COZAAR) 25 MG tablet Take 25 mg by mouth daily       methocarbamol (ROBAXIN) 750 MG tablet Take 1 tablet (750 mg) by mouth 4 times daily as needed for muscle spasms 120 tablet 1     pantoprazole (PROTONIX) 40 MG EC tablet Take 1 tablet (40 mg) by mouth every morning (before breakfast) (Patient taking differently: Take 20 mg by mouth every morning (before breakfast) ) 30 tablet 0     rosuvastatin  (CRESTOR) 20 MG tablet Take 1 tablet (20 mg) by mouth daily 30 tablet 3     traMADol (ULTRAM) 50 MG tablet Take 1 tablet (50 mg) by mouth every 6 hours as needed for moderate pain 50 tablet 0     [] colchicine (COLCYRS) 0.6 MG tablet Take by mouth daily On 3/30 take 1.2 mg PO x 1, then 0.6 mg one hour later  Starting 3/31-4/3 take 0.6 mg PO BID, then discontinue med       guaiFENesin (ROBITUSSIN) 20 mg/mL SOLN solution Take 10 mLs by mouth every 4 hours as needed for cough (Patient not taking: Reported on 2019) 1000 mL 0     polyethylene glycol (MIRALAX/GLYCOLAX) packet Take 17 g by mouth daily as needed for constipation       [] predniSONE (DELTASONE) 20 MG tablet Take 20 mg by mouth daily.       senna-docusate (SENOKOT-S/PERICOLACE) 8.6-50 MG tablet Take 1 tablet by mouth 2 times daily as needed for constipation 60 tablet 0     [DISCONTINUED] aspirin (ASA) 325 MG EC tablet Take 325 mg by mouth daily       [DISCONTINUED] furosemide (LASIX) 20 MG tablet Take 20 mg by mouth daily        [DISCONTINUED] potassium chloride ER (K-DUR/KLOR-CON M) 20 MEQ CR tablet Take 20 mEq by mouth daily        No facility-administered encounter medications on file as of 2019.        Again, thank you for allowing me to participate in the care of your patient.      Sincerely,    Octavio Hughes MD     [facility]

## 2019-04-22 NOTE — LETTER
4/22/2019      [facility]  [address]  [facility] [address]      RE: Edilberto Brooks       Dear Colleague,    I had the pleasure of seeing Edilberto Brooks in the [facility].    Service Date: 04/22/2019      PRIMARY CARE PROVIDER:  [facility], [place], [place]      REASON FOR VISIT:  Followup after recent coronary artery bypass grafting done on 03/12/2019.      HISTORY OF PRESENT ILLNESS:  Thom Thomas is new to my practice.  He was recently hospitalized in 03/2019 with chest pain and elevated troponin I of 1.8, diagnosed with a non-STEMI and found to have severe triple-vessel coronary artery disease with preserved LVEF of 55%-60%.  He has mild aortic root dilatation of 4.0 cm.  His risk factors include hyperlipidemia (was taking atorvastatin 80 mg daily) and hypertension (managed on losartan/hydrochlorothiazide, amlodipine and metoprolol).  He is morbidly obese with a BMI of 42 kg/m2.  There is potentially a family history of coronary artery disease in both parents, but details are not specified.  Although his previous notes suggest he used to smoke a pack of cigarettes daily, the patient categorically denies this.        On 03/20/2019, he underwent CABG x4 by Dr. Reid ([place] to LAD, vein grafts to obtuse marginal, PDA and diagonal).  His postoperative course was unremarkable, but he was in the hospital for almost 12 days (03/06/2019 to 03/18/2019, mainly for physical deconditioning.  He did have episodes of asymptomatic *** associated with sleep, was seen by Dr. Jean Baptiste from Electrophysiology and beta blocker discontinued.  He has not had any awake presyncopal/syncopal episodes.  [facility] physical deconditioning.        The patient is doing well.  He has expected sternal wound tenderness without discharge.  He is participating in Cardiac Rehab.  He lives alone, works as an , but has been off work on short-term disability following  his bypass surgery.  Rare alcohol, denies any tobacco use.  Overall, progressing well.      DIAGNOSTIC DATA:  Sodium 141, potassium 3.9, BUN 20, creatinine 1.0, estimated GFR 83.  Total cholesterol 229, HDL 37, , triglycerides 201 (on 20 mg of rosuvastatin).  Hemoglobin 12.7.      PHYSICAL EXAMINATION:     VITAL SIGNS:  /80, pulse 76 per minute, height 1.8 meters (6 feet 2 inches), weight 149 kg (329 pounds), BMI 42 kg/m2.     Pertinent for sternal wound healing well without any discharge.  Expected tenderness.  There is a superficial skin *** scabbing well.  Physical exam is limited by very high BMI.  Apical impulse not palpable.  Heart sounds are regular.  No murmur or pericardial friction rub.  Lungs are clear without clinical evidence of pleural effusion.  Large abdominal pannus, soft and nontender.  There is 1+ pitting edema of both ankles.  Mentally alert and oriented.      DIAGNOSES:   1.  Status post successful CABG x4 (LIMA to LAD and vein graft to OM, PDA and diagonal) on 03/12/2019.   2.  Hypertension, optimally treated.   3.  Hyperlipidemia (LDL not at goal) 152 on 20 mg of rosuvastatin).      4.  Obesity with a BMI of 42.2 kg/m2.      ECG done today shows sinus rhythm with low voltage (high BMI).  No Q-waves.  Sinus rate 70 BPM,  milliseconds, QTc 389 milliseconds.      Transthoracic echocardiogram before surgery shows normal LV size, LVEF 55%-60%, no wall motion abnormalities, mild ascending aorta dilatation.  Aortic root dilatation of 4.0 cm.  Normal ascending aorta of 3.3 cm.  Aortic valve not well visualized, trace aortic valve regurgitation.      Carotid ultrasound shows less than 50% stenosis of both internal carotid arteries.      ASSESSMENT AND PLAN:  The patient is progressing as well as could be expected after his recent cardiac bypass surgery 6 weeks ago.  He is normotensive on current therapy, his sternal wound is healing well, and he is participating in Cardiac Rehab.   His LDL is not at goal at 152 on 20 mg of rosuvastatin.  I note he was switched from 80 mg of atorvastatin at his recent hospitalization.  I am just going to watch it for now.  Hopefully, with Cardiac Rehab and weight loss, this will improve further.  He has a history of snoring and suggestive body habitus and should be screened for sleep apnea.   1.  Medication changes.  Decrease aspirin from 325 mg to 81 mg daily, stop potassium supplement 20 mEq daily, stop furosemide 20 mg daily, restart previous established hydrochlorothiazide 25 mg daily.   2.  ***.   3.  He can return to full-time work.  He works as an  for [employer].   4.  Follow up in my clinic in 6 months with previsit labs and transthoracic echocardiogram.        cc:   [facility]   [address] [place] [id number]         [facility] JOAN ORR MD             D: 2019   T: 2019   MT:       Name:     SALOMON DELGADO   MRN:      [id number]        Account:      [id number]   :      1970           Service Date: 2019      Document: [id number]         Outpatient Encounter Medications as of 2019   Medication Sig Dispense Refill     acetaminophen (TYLENOL) 500 MG tablet Take 1,000 mg by mouth 3 times daily And Daily PRN       amLODIPine (NORVASC) 10 MG tablet Take 10 mg by mouth daily       aspirin (ASA) 81 MG chewable tablet Take 1 tablet (81 mg) by mouth daily 100 tablet 3     gabapentin (NEURONTIN) 300 MG capsule Take 1 capsule (300 mg) by mouth 3 times daily 42 capsule 0     hydrochlorothiazide (HYDRODIURIL) 25 MG tablet Take 1 tablet (25 mg) by mouth every morning 100 tablet 3     levothyroxine (SYNTHROID/LEVOTHROID) 50 MCG tablet Take 50 mcg by mouth daily       losartan (COZAAR) 25 MG tablet Take 25 mg by mouth daily       methocarbamol (ROBAXIN) 750 MG tablet Take 1 tablet (750 mg) by mouth 4 times daily as needed for muscle spasms 120 tablet 1     pantoprazole (PROTONIX) 40 MG EC tablet Take 1 tablet (40 mg)  by mouth every morning (before breakfast) (Patient taking differently: Take 20 mg by mouth every morning (before breakfast) ) 30 tablet 0     rosuvastatin (CRESTOR) 20 MG tablet Take 1 tablet (20 mg) by mouth daily 30 tablet 3     traMADol (ULTRAM) 50 MG tablet Take 1 tablet (50 mg) by mouth every 6 hours as needed for moderate pain 50 tablet 0     [] colchicine (COLCYRS) 0.6 MG tablet Take by mouth daily On 3/30 take 1.2 mg PO x 1, then 0.6 mg one hour later  Starting 3/31-4/3 take 0.6 mg PO BID, then discontinue med       guaiFENesin (ROBITUSSIN) 20 mg/mL SOLN solution Take 10 mLs by mouth every 4 hours as needed for cough (Patient not taking: Reported on 2019) 1000 mL 0     polyethylene glycol (MIRALAX/GLYCOLAX) packet Take 17 g by mouth daily as needed for constipation       [] predniSONE (DELTASONE) 20 MG tablet Take 20 mg by mouth daily.       senna-docusate (SENOKOT-S/PERICOLACE) 8.6-50 MG tablet Take 1 tablet by mouth 2 times daily as needed for constipation 60 tablet 0     [DISCONTINUED] aspirin (ASA) 325 MG EC tablet Take 325 mg by mouth daily       [DISCONTINUED] furosemide (LASIX) 20 MG tablet Take 20 mg by mouth daily        [DISCONTINUED] potassium chloride ER (K-DUR/KLOR-CON M) 20 MEQ CR tablet Take 20 mEq by mouth daily        No facility-administered encounter medications on file as of 2019.        Again, thank you for allowing me to participate in the care of your patient.      Sincerely,    Octavio Hughes MD     [facility]

## 2019-04-22 NOTE — LETTER
4/22/2019    [facility]  [address]  \[facility] [place] [facility]\"" [id number]    RE: Edilberto Brooks       Dear Colleague,    I had the pleasure of seeing Edilberto Brooks in the [facility].    Clinic visit note dictated. Dictation reference number - [id number]          REVIEW OF SYSTEMS:  A comprehensive 10-point review of systems was completed and the pertinent positives are documented in the history of present illness.    Skin:  Negative     Eyes:  Negative    ENT:  Negative    Respiratory:  Negative    Cardiovascular:  Negative    Gastroenterology: Negative    Genitourinary:  Negative    Musculoskeletal:  Positive for gout  Neurologic:  Negative    Psychiatric:  Negative    Heme/Lymph/Imm:  Negative    Endocrine:  Negative      CURRENT MEDICATIONS:  Current Outpatient Medications   Medication Sig Dispense Refill     acetaminophen (TYLENOL) 500 MG tablet Take 1,000 mg by mouth 3 times daily And Daily PRN       amLODIPine (NORVASC) 10 MG tablet Take 10 mg by mouth daily       aspirin (ASA) 81 MG chewable tablet Take 1 tablet (81 mg) by mouth daily 100 tablet 3     gabapentin (NEURONTIN) 300 MG capsule Take 1 capsule (300 mg) by mouth 3 times daily 42 capsule 0     hydrochlorothiazide (HYDRODIURIL) 25 MG tablet Take 1 tablet (25 mg) by mouth every morning 100 tablet 3     levothyroxine (SYNTHROID/LEVOTHROID) 50 MCG tablet Take 50 mcg by mouth daily       losartan (COZAAR) 25 MG tablet Take 25 mg by mouth daily       methocarbamol (ROBAXIN) 750 MG tablet Take 1 tablet (750 mg) by mouth 4 times daily as needed for muscle spasms 120 tablet 1     pantoprazole (PROTONIX) 40 MG EC tablet Take 1 tablet (40 mg) by mouth every morning (before breakfast) (Patient taking differently: Take 20 mg by mouth every morning (before breakfast) ) 30 tablet 0     rosuvastatin (CRESTOR) 20 MG tablet Take 1 tablet (20 mg) by mouth daily 30 tablet 3     traMADol (ULTRAM) 50 MG tablet Take 1 tablet (50 mg) by mouth  every 6 hours as needed for moderate pain 50 tablet 0     guaiFENesin (ROBITUSSIN) 20 mg/mL SOLN solution Take 10 mLs by mouth every 4 hours as needed for cough (Patient not taking: Reported on 4/22/2019) 1000 mL 0     polyethylene glycol (MIRALAX/GLYCOLAX) packet Take 17 g by mouth daily as needed for constipation       senna-docusate (SENOKOT-S/PERICOLACE) 8.6-50 MG tablet Take 1 tablet by mouth 2 times daily as needed for constipation 60 tablet 0         ALLERGIES:  Allergies   Allergen Reactions     Codeine      Hypotension/ Dizziness          PAST MEDICAL HISTORY:    Past Medical History:   Diagnosis Date     Coronary artery disease involving native coronary artery of native heart without angina pectoris 03/12/2019    NSTEMI, s/p CABG 3/12/2019     Hypercholesteremia      Hypertension      Obesity        PAST SURGICAL HISTORY:    Past Surgical History:   Procedure Laterality Date     BYPASS GRAFT ARTERY CORONARY N/A 3/12/2019    Procedure: CORONARY ARTERY BYPASS GRAFTING x 4 (LIMA - LAD; SV - OM; SV - PDA; SV - DIAGONAL) WITH ENDOSCOPIC VEIN HARVEST ON THE LEFT LOWER EXTREMITY    (ON PUMP OXYGENATOR ; [facility]Y BY [facility]);  Surgeon: Mike Rincon MD;  Location:  OR     CV HEART CATHETERIZATION WITH POSSIBLE INTERVENTION N/A 3/7/2019    Procedure: Heart Catheterization with possible Intervention;  Surgeon: Drew Logan MD;  Location:  HEART CARDIAC CATH LAB     FRACTURE SURGERY  1997    left leg        FAMILY HISTORY:    Family History   Problem Relation Age of Onset     Heart Disease Mother 68        MI in 60's     Breast Cancer Mother      Pacemaker Father      Colon Cancer Father 70     Myocardial Infarction Father 70     No Known Problems Sister      Diabetes No family hx of        SOCIAL HISTORY:    Social History     Socioeconomic History     Marital status:      Spouse name: None     Number of children: None     Years of education: None     Highest education level: None  "  Occupational History     Occupation:    Social Needs     Financial resource strain: None     Food insecurity:     Worry: None     Inability: None     Transportation needs:     Medical: None     Non-medical: None   Tobacco Use     Smoking status: Never Smoker     Smokeless tobacco: Never Used   Substance and Sexual Activity     Alcohol use: Yes     Frequency: Monthly or less     Comment: social      Drug use: No     Sexual activity: Yes     Partners: Female   Lifestyle     Physical activity:     Days per week: None     Minutes per session: None     Stress: None   Relationships     Social connections:     Talks on phone: None     Gets together: None     Attends [employer] service: None     Active member of club or organization: None     Attends meetings of clubs or organizations: None     Relationship status: None     Intimate partner violence:     Fear of current or ex partner: None     Emotionally abused: None     Physically abused: None     Forced sexual activity: None   Other Topics Concern     None   Social History Narrative     None       PHYSICAL EXAM:    Vitals: /80   Pulse 76   Ht 1.88 m (6' 2\")   Wt 149.4 kg (329 lb 4.8 oz)   SpO2 97%   BMI 42.28 kg/m     Wt Readings from Last 5 Encounters:   04/22/19 149.4 kg (329 lb 4.8 oz)   04/01/19 147.9 kg (326 lb)   03/30/19 148.2 kg (326 lb 12.8 oz)   03/29/19 146.1 kg (322 lb)   03/25/19 99.1 kg (218 lb 6.4 oz)       Thank you for allowing me to participate in the care of your patient.      Sincerely,     Octavio Hughes MD     [facility]    cc:   No referring provider defined for this encounter.        "

## 2019-04-22 NOTE — PROGRESS NOTES
Service Date: 04/22/2019      PRIMARY CARE PROVIDER:  [facility], [place], [place]      REASON FOR VISIT:  Followup after recent coronary artery bypass grafting done on 03/12/2019.      HISTORY OF PRESENT ILLNESS:    Thom Thomas is new to my practice.  He was recently hospitalized in 03/2019 with chest pain and elevated troponin I of 1.8, diagnosed with a non-STEMI and found to have severe triple-vessel coronary artery disease with preserved LVEF of 55%-60%.  He has mild aortic root dilatation of 4.0 cm.  His risk factors include morbid obesity, hyperlipidemia (was taking atorvastatin 80 mg daily) and hypertension (managed on losartan/hydrochlorothiazide, amlodipine and metoprolol).  He is morbidly obese with a BMI of 42 kg/m2.  There is potentially a family history of coronary artery disease in both parents, but details are not specified.  Although his previous notes suggest he used to smoke a pack of cigarettes daily, the patient categorically denies this.        On 03/20/2019, he underwent CABG x 4 by Dr. Mike Rincon (LIMA to LAD, vein grafts to obtuse marginal, PDA and diagonal).  Post-operatively, have episodes of asymptomatic asystole/bradycardia associated with sleep, was seen by Dr. Jean Baptiste from Electrophysiology and beta blocker discontinued.  He has not had any awake presyncopal/syncopal episodes.  He was discharged to [facility] due to physical deconditioning.        The patient is doing well.  He has expected sternal wound tenderness without discharge.  He is participating in Cardiac Rehab.  He lives alone, works as an , but has been off work on short-term disability following his bypass surgery.  Rare alcohol, denies any tobacco use (although hospital notes state otherwise).       DIAGNOSTIC DATA:    Labs: Sodium 141, potassium 3.9, BUN 20, creatinine 1.0, estimated GFR 83.  Total cholesterol 229, HDL 37, , triglycerides 201 (on 20 mg of rosuvastatin).  Hemoglobin 12.7.     ECG done  today shows sinus rhythm with low voltage (high BMI).  No Q-waves.  Sinus rate 70 BPM,  milliseconds, QTc 389 milliseconds.      Transthoracic echocardiogram before surgery shows normal LV size, LVEF 55%-60%, no wall motion abnormalities, mild ascending aorta dilatation.  Aortic root dilatation of 4.0 cm.  Normal ascending aorta of 3.3 cm.  Aortic valve not well visualized, trace aortic valve regurgitation.      Carotid ultrasound shows less than 50% stenosis of both internal carotid arteries.     PHYSICAL EXAMINATION:     VITAL SIGNS:  /80, pulse 76 per minute, height 1.8 meters (6 feet 2 inches), weight 149 kg (329 pounds), BMI 42 kg/m2.     Physical exam is limited by very high BMI.  Sternal wound healing well without any discharge.  Expected tenderness, healing scabs.  Apical impulse not palpable.  Heart sounds are regular.  No murmur or pericardial friction rub.  Lungs are clear without clinical evidence of pleural effusion.  Large abdominal pannus, soft and nontender.  There is 1+ pitting edema of both ankles.  Mentally alert and oriented.      DIAGNOSES:   1.  Status post successful CABG x4 (LIMA to LAD and vein graft to OM, PDA and diagonal) on 03/12/2019.   2.  Hypertension, optimally treated.   3.  Hyperlipidemia (LDL not at goal - 152 on 20 mg of rosuvastatin).      4.  Obesity with a BMI of 42.2 kg/m2      ASSESSMENT AND PLAN:    Mechelle is progressing after his recent cardiac bypass surgery 6 weeks ago.  He is normotensive on current therapy, his sternal wound is healing well, and he is participating in Cardiac Rehab.  His LDL is not at goal at 152 on 20 mg of rosuvastatin.  I note he was switched from 80 mg of atorvastatin at his recent hospitalization.  I am just going to watch it for now.  Hopefully, with Cardiac Rehab and weight loss, this will improve further.  He has a history of snoring and suggestive body habitus and should be screened for sleep apnea.     1.  Medication changes.   Decrease aspirin from 325 mg to 81 mg daily, stop potassium supplement 20 mEq daily, stop furosemide 20 mg daily, restart previous established hydrochlorothiazide 25 mg daily.   2.  He can return to full-time work.  He works as an  for [employer].   3.  Follow up in my clinic in 6 months with previsit labs and transthoracic echocardiogram.        cc:   [facility] [place] [id number] [place] [address], [place] [address]         [facility] JOAN ORR MD             D: 2019   T: 2019   MT: [id number]      Name:     SALOMON DELGADO   MRN:      [id number]        Account:      [id number]   :      1970           Service Date: 2019      Document: [id number]

## 2019-04-24 ENCOUNTER — HOSPITAL ENCOUNTER (OUTPATIENT)
Dept: CARDIAC REHAB | Facility: CLINIC | Age: 49
End: 2019-04-24
Attending: STUDENT IN AN ORGANIZED HEALTH CARE EDUCATION/TRAINING PROGRAM
Payer: COMMERCIAL

## 2019-04-24 PROCEDURE — 40000116 ZZH STATISTIC OP CR VISIT

## 2019-04-24 PROCEDURE — 93798 PHYS/QHP OP CAR RHAB W/ECG: CPT

## 2019-04-26 ENCOUNTER — HOSPITAL ENCOUNTER (OUTPATIENT)
Dept: CARDIAC REHAB | Facility: CLINIC | Age: 49
End: 2019-04-26
Attending: STUDENT IN AN ORGANIZED HEALTH CARE EDUCATION/TRAINING PROGRAM
Payer: COMMERCIAL

## 2019-04-26 PROCEDURE — 93798 PHYS/QHP OP CAR RHAB W/ECG: CPT | Performed by: OCCUPATIONAL THERAPIST

## 2019-04-26 PROCEDURE — 40000116 ZZH STATISTIC OP CR VISIT: Performed by: OCCUPATIONAL THERAPIST

## 2019-05-06 ENCOUNTER — HOSPITAL ENCOUNTER (OUTPATIENT)
Dept: CARDIAC REHAB | Facility: CLINIC | Age: 49
End: 2019-05-06
Attending: STUDENT IN AN ORGANIZED HEALTH CARE EDUCATION/TRAINING PROGRAM
Payer: COMMERCIAL

## 2019-05-06 PROCEDURE — 40000116 ZZH STATISTIC OP CR VISIT: Performed by: OCCUPATIONAL THERAPIST

## 2019-05-06 PROCEDURE — 93798 PHYS/QHP OP CAR RHAB W/ECG: CPT | Performed by: OCCUPATIONAL THERAPIST

## 2019-05-08 ENCOUNTER — HOSPITAL ENCOUNTER (OUTPATIENT)
Dept: CARDIAC REHAB | Facility: CLINIC | Age: 49
End: 2019-05-08
Attending: STUDENT IN AN ORGANIZED HEALTH CARE EDUCATION/TRAINING PROGRAM
Payer: COMMERCIAL

## 2019-05-08 PROCEDURE — 93798 PHYS/QHP OP CAR RHAB W/ECG: CPT

## 2019-05-08 PROCEDURE — 40000116 ZZH STATISTIC OP CR VISIT

## 2019-05-10 ENCOUNTER — HOSPITAL ENCOUNTER (OUTPATIENT)
Dept: CARDIAC REHAB | Facility: CLINIC | Age: 49
End: 2019-05-10
Attending: STUDENT IN AN ORGANIZED HEALTH CARE EDUCATION/TRAINING PROGRAM
Payer: COMMERCIAL

## 2019-05-10 PROCEDURE — 93797 PHYS/QHP OP CAR RHAB WO ECG: CPT | Performed by: REHABILITATION PRACTITIONER

## 2019-05-10 PROCEDURE — 93798 PHYS/QHP OP CAR RHAB W/ECG: CPT | Performed by: REHABILITATION PRACTITIONER

## 2019-05-10 PROCEDURE — 40000116 ZZH STATISTIC OP CR VISIT: Performed by: REHABILITATION PRACTITIONER

## 2019-05-10 PROCEDURE — 40000575 ZZH STATISTIC OP CARDIAC VISIT #2: Performed by: REHABILITATION PRACTITIONER

## 2019-05-13 ENCOUNTER — HOSPITAL ENCOUNTER (OUTPATIENT)
Dept: CARDIAC REHAB | Facility: CLINIC | Age: 49
End: 2019-05-13
Attending: STUDENT IN AN ORGANIZED HEALTH CARE EDUCATION/TRAINING PROGRAM
Payer: COMMERCIAL

## 2019-05-13 PROCEDURE — 93798 PHYS/QHP OP CAR RHAB W/ECG: CPT

## 2019-05-13 PROCEDURE — 40000116 ZZH STATISTIC OP CR VISIT

## 2019-05-15 ENCOUNTER — HOSPITAL ENCOUNTER (OUTPATIENT)
Dept: CARDIAC REHAB | Facility: CLINIC | Age: 49
End: 2019-05-15
Attending: STUDENT IN AN ORGANIZED HEALTH CARE EDUCATION/TRAINING PROGRAM
Payer: COMMERCIAL

## 2019-05-15 PROCEDURE — 93798 PHYS/QHP OP CAR RHAB W/ECG: CPT | Performed by: REHABILITATION PRACTITIONER

## 2019-05-15 PROCEDURE — 40000116 ZZH STATISTIC OP CR VISIT: Performed by: REHABILITATION PRACTITIONER

## 2019-05-20 ENCOUNTER — HOSPITAL ENCOUNTER (OUTPATIENT)
Dept: CARDIAC REHAB | Facility: CLINIC | Age: 49
End: 2019-05-20
Attending: STUDENT IN AN ORGANIZED HEALTH CARE EDUCATION/TRAINING PROGRAM
Payer: COMMERCIAL

## 2019-05-20 PROCEDURE — 93798 PHYS/QHP OP CAR RHAB W/ECG: CPT

## 2019-05-20 PROCEDURE — 40000116 ZZH STATISTIC OP CR VISIT

## 2019-05-22 ENCOUNTER — HOSPITAL ENCOUNTER (OUTPATIENT)
Dept: CARDIAC REHAB | Facility: CLINIC | Age: 49
End: 2019-05-22
Attending: STUDENT IN AN ORGANIZED HEALTH CARE EDUCATION/TRAINING PROGRAM
Payer: COMMERCIAL

## 2019-05-22 PROCEDURE — 40000116 ZZH STATISTIC OP CR VISIT: Performed by: OCCUPATIONAL THERAPIST

## 2019-05-22 PROCEDURE — 93798 PHYS/QHP OP CAR RHAB W/ECG: CPT | Performed by: OCCUPATIONAL THERAPIST

## 2019-05-24 ENCOUNTER — HOSPITAL ENCOUNTER (OUTPATIENT)
Dept: CARDIAC REHAB | Facility: CLINIC | Age: 49
End: 2019-05-24
Attending: STUDENT IN AN ORGANIZED HEALTH CARE EDUCATION/TRAINING PROGRAM
Payer: COMMERCIAL

## 2019-05-24 PROCEDURE — 40000116 ZZH STATISTIC OP CR VISIT

## 2019-05-24 PROCEDURE — 93798 PHYS/QHP OP CAR RHAB W/ECG: CPT

## 2019-05-24 PROCEDURE — 40000244 ZZH STATISTIC VISIT PULM REHAB

## 2019-05-29 ENCOUNTER — HOSPITAL ENCOUNTER (OUTPATIENT)
Dept: CARDIAC REHAB | Facility: CLINIC | Age: 49
End: 2019-05-29
Attending: STUDENT IN AN ORGANIZED HEALTH CARE EDUCATION/TRAINING PROGRAM
Payer: COMMERCIAL

## 2019-05-29 PROCEDURE — 93798 PHYS/QHP OP CAR RHAB W/ECG: CPT | Performed by: REHABILITATION PRACTITIONER

## 2019-05-29 PROCEDURE — 40000116 ZZH STATISTIC OP CR VISIT: Performed by: REHABILITATION PRACTITIONER

## 2019-05-31 ENCOUNTER — HOSPITAL ENCOUNTER (OUTPATIENT)
Dept: CARDIAC REHAB | Facility: CLINIC | Age: 49
End: 2019-05-31
Attending: STUDENT IN AN ORGANIZED HEALTH CARE EDUCATION/TRAINING PROGRAM
Payer: COMMERCIAL

## 2019-05-31 PROCEDURE — 40000116 ZZH STATISTIC OP CR VISIT: Performed by: OCCUPATIONAL THERAPIST

## 2019-05-31 PROCEDURE — 93798 PHYS/QHP OP CAR RHAB W/ECG: CPT | Performed by: OCCUPATIONAL THERAPIST

## 2019-06-07 ENCOUNTER — HOSPITAL ENCOUNTER (OUTPATIENT)
Dept: CARDIAC REHAB | Facility: CLINIC | Age: 49
End: 2019-06-07
Attending: STUDENT IN AN ORGANIZED HEALTH CARE EDUCATION/TRAINING PROGRAM
Payer: COMMERCIAL

## 2019-06-07 PROCEDURE — 40000116 ZZH STATISTIC OP CR VISIT

## 2019-06-07 PROCEDURE — 93798 PHYS/QHP OP CAR RHAB W/ECG: CPT

## 2019-06-10 ENCOUNTER — HOSPITAL ENCOUNTER (OUTPATIENT)
Dept: CARDIAC REHAB | Facility: CLINIC | Age: 49
End: 2019-06-10
Attending: STUDENT IN AN ORGANIZED HEALTH CARE EDUCATION/TRAINING PROGRAM
Payer: COMMERCIAL

## 2019-06-10 PROCEDURE — 93798 PHYS/QHP OP CAR RHAB W/ECG: CPT

## 2019-06-10 PROCEDURE — 40000116 ZZH STATISTIC OP CR VISIT

## 2019-06-12 ENCOUNTER — HOSPITAL ENCOUNTER (OUTPATIENT)
Dept: CARDIAC REHAB | Facility: CLINIC | Age: 49
End: 2019-06-12
Attending: STUDENT IN AN ORGANIZED HEALTH CARE EDUCATION/TRAINING PROGRAM
Payer: COMMERCIAL

## 2019-06-12 PROCEDURE — 93798 PHYS/QHP OP CAR RHAB W/ECG: CPT

## 2019-06-12 PROCEDURE — 40000116 ZZH STATISTIC OP CR VISIT

## 2019-06-14 ENCOUNTER — HOSPITAL ENCOUNTER (OUTPATIENT)
Dept: CARDIAC REHAB | Facility: CLINIC | Age: 49
End: 2019-06-14
Attending: STUDENT IN AN ORGANIZED HEALTH CARE EDUCATION/TRAINING PROGRAM
Payer: COMMERCIAL

## 2019-06-14 PROCEDURE — 40000116 ZZH STATISTIC OP CR VISIT: Performed by: OCCUPATIONAL THERAPIST

## 2019-06-14 PROCEDURE — 93798 PHYS/QHP OP CAR RHAB W/ECG: CPT | Performed by: OCCUPATIONAL THERAPIST

## 2019-06-17 ENCOUNTER — HOSPITAL ENCOUNTER (OUTPATIENT)
Dept: CARDIAC REHAB | Facility: CLINIC | Age: 49
End: 2019-06-17
Attending: STUDENT IN AN ORGANIZED HEALTH CARE EDUCATION/TRAINING PROGRAM
Payer: COMMERCIAL

## 2019-06-17 PROCEDURE — 93798 PHYS/QHP OP CAR RHAB W/ECG: CPT

## 2019-06-17 PROCEDURE — 40000116 ZZH STATISTIC OP CR VISIT

## 2019-06-19 ENCOUNTER — HOSPITAL ENCOUNTER (OUTPATIENT)
Dept: CARDIAC REHAB | Facility: CLINIC | Age: 49
End: 2019-06-19
Attending: STUDENT IN AN ORGANIZED HEALTH CARE EDUCATION/TRAINING PROGRAM
Payer: COMMERCIAL

## 2019-06-19 PROCEDURE — 93798 PHYS/QHP OP CAR RHAB W/ECG: CPT | Performed by: OCCUPATIONAL THERAPIST

## 2019-06-19 PROCEDURE — 40000116 ZZH STATISTIC OP CR VISIT: Performed by: OCCUPATIONAL THERAPIST

## 2019-06-24 ENCOUNTER — HOSPITAL ENCOUNTER (OUTPATIENT)
Dept: CARDIAC REHAB | Facility: CLINIC | Age: 49
End: 2019-06-24
Attending: STUDENT IN AN ORGANIZED HEALTH CARE EDUCATION/TRAINING PROGRAM
Payer: COMMERCIAL

## 2019-06-24 PROCEDURE — 93798 PHYS/QHP OP CAR RHAB W/ECG: CPT

## 2019-06-24 PROCEDURE — 40000116 ZZH STATISTIC OP CR VISIT

## 2019-06-28 ENCOUNTER — HOSPITAL ENCOUNTER (OUTPATIENT)
Dept: CARDIAC REHAB | Facility: CLINIC | Age: 49
End: 2019-06-28
Attending: STUDENT IN AN ORGANIZED HEALTH CARE EDUCATION/TRAINING PROGRAM
Payer: COMMERCIAL

## 2019-06-28 PROCEDURE — 93798 PHYS/QHP OP CAR RHAB W/ECG: CPT

## 2019-06-28 PROCEDURE — 40000116 ZZH STATISTIC OP CR VISIT

## 2019-07-01 ENCOUNTER — HOSPITAL ENCOUNTER (OUTPATIENT)
Dept: CARDIAC REHAB | Facility: CLINIC | Age: 49
End: 2019-07-01
Attending: STUDENT IN AN ORGANIZED HEALTH CARE EDUCATION/TRAINING PROGRAM
Payer: COMMERCIAL

## 2019-07-01 PROCEDURE — 40000116 ZZH STATISTIC OP CR VISIT

## 2019-07-01 PROCEDURE — 93798 PHYS/QHP OP CAR RHAB W/ECG: CPT

## 2019-07-16 ENCOUNTER — HOSPITAL ENCOUNTER (OUTPATIENT)
Dept: CARDIAC REHAB | Facility: CLINIC | Age: 49
End: 2019-07-16
Attending: STUDENT IN AN ORGANIZED HEALTH CARE EDUCATION/TRAINING PROGRAM
Payer: COMMERCIAL

## 2019-07-16 PROCEDURE — 40000116 ZZH STATISTIC OP CR VISIT

## 2019-07-16 PROCEDURE — 93797 PHYS/QHP OP CAR RHAB WO ECG: CPT | Mod: 59

## 2019-07-16 PROCEDURE — 40000575 ZZH STATISTIC OP CARDIAC VISIT #2

## 2019-07-16 PROCEDURE — 93798 PHYS/QHP OP CAR RHAB W/ECG: CPT

## 2020-04-29 DIAGNOSIS — Z95.1 S/P CABG (CORONARY ARTERY BYPASS GRAFT): Primary | ICD-10-CM

## 2020-04-29 NOTE — PROGRESS NOTES
Patient is overdue for follow up post-CABG. Message to scheduling to contact patient for Kerline visit with Dr. Hughes. Orders updated.

## (undated) DEVICE — SU STEEL MYO/WIRE II STERNOTOMY 8 BE-1 3X14" 048-217

## (undated) DEVICE — SOL NACL 0.9% IRRIG 1000ML BOTTLE 07138-09

## (undated) DEVICE — Device

## (undated) DEVICE — SU ETHIBOND 0 CT-1 CR 8X18" CX21D

## (undated) DEVICE — SU SILK 1 TIE 10X30" SA87G

## (undated) DEVICE — SLEEVE TR BAND RADIAL COMPRESSION DEVICE 24CM TRB24-REG

## (undated) DEVICE — SU PROLENE 4-0 RB-1DA 36" 8557H

## (undated) DEVICE — KIT ENDO VASOVIEW HEMOPRO 2 VH-4000

## (undated) DEVICE — TOTE ANGIO CORP PC15AT SAN32CC83O

## (undated) DEVICE — WIRE GUIDE 0.035"X260CM SAFE-T-J EXCHANGE G00517

## (undated) DEVICE — CANNULA PERFUSION AORTIC ROOT 22FR 20012

## (undated) DEVICE — SU PROLENE 7-0 BV175-8 24" M8747

## (undated) DEVICE — SU PROLENE 7-0 BV-1DA 24" 8702H

## (undated) DEVICE — ESU ELEC BLADE 2.75" COATED/INSULATED E1455

## (undated) DEVICE — PACK TUBING MINI VAC CUSTOM 1/2X3/8T BB9J78R4

## (undated) DEVICE — CATH ANGIO JUDKINS R4 6FRX100CM INFINITI 534621T

## (undated) DEVICE — DRAIN CHEST TUBE 32FR STR 8032

## (undated) DEVICE — DRAIN CHEST TUBE RIGHT ANGLED 32FR 8132

## (undated) DEVICE — SU VICRYL 2-0 CT-1 27" UND J259H

## (undated) DEVICE — COVER TABLE POLY 65X90" 8186

## (undated) DEVICE — CLIP HORIZON MULTI SM YELLOW 001204

## (undated) DEVICE — SOL RINGERS LACTATED 1000ML BAG 2B2324X

## (undated) DEVICE — DECANTER BAG 2002S

## (undated) DEVICE — PREP CHLORAPREP 26ML TINTED ORANGE  260815

## (undated) DEVICE — CABLE MYO/LEAD PACING WHITE DISP 019-530

## (undated) DEVICE — PACK MINI VAC CUSTOM CARDOPULMONARY BB5Z97R15

## (undated) DEVICE — SOL WATER IRRIG 1000ML BOTTLE 2F7114

## (undated) DEVICE — DRSG KERLIX 4 1/2"X4YDS ROLL 6715

## (undated) DEVICE — DEFIB PRO-PADZ LVP LQD GEL ADULT 8900-2105-01

## (undated) DEVICE — DEVICE ASSEMBLY SUCTION/ANTI COAG BTC93

## (undated) DEVICE — SU VICRYL 0 CTX 36" J370H

## (undated) DEVICE — CANNULA PERFUSION VENOUS 2 STAGE 34-46FR

## (undated) DEVICE — PROTECTOR ARM ONE-STEP TRENDELENBURG 40418

## (undated) DEVICE — SUCTION CANISTER MEDIVAC LINER 3000ML W/LID 65651-530

## (undated) DEVICE — RESERVOIR CELL SAVING BLOOD COLLECTION EL2120

## (undated) DEVICE — CANNULA PERFUSION ARTERIAL 22FR 12" 77422

## (undated) DEVICE — SU PROLENE 6-0 C-1DA 30" M8706

## (undated) DEVICE — GLOVE DERMASSURE GREEN PF 7.5 LATEX FREE MSG6575

## (undated) DEVICE — GOWN XLG DISP 9545

## (undated) DEVICE — RX SURGIFLO HEMOSTATIC MATRIX W/THROMBIN 8ML 2994

## (undated) DEVICE — SUCTION CATH AIRLIFE TRI-FLO W/CONTROL PORT 14FR  T60C

## (undated) DEVICE — SURGICEL HEMOSTAT 2X14" 1951

## (undated) DEVICE — CABLE MYO/LEAD PACING BLUE DISP 019-535

## (undated) DEVICE — INTRO GLIDESHEATH SLENDER 6FR 10X45CM 60-1060

## (undated) DEVICE — SU STEEL 6 CCS 4X18" M654G

## (undated) DEVICE — GOWN LG DISP 9515

## (undated) DEVICE — PUNCH AORTIC 4.0MMX8" RCB40

## (undated) DEVICE — CATH ANGIO INFINITI 3DRC 6FRX100CM 534676T

## (undated) DEVICE — KIT HAND CONTROL ANGIOTOUCH ACIST 65CM AT-P65

## (undated) DEVICE — ADPT PERFUSION MULTIPLE

## (undated) DEVICE — LINEN TOWEL PACK X30 5481

## (undated) DEVICE — SU PROLENE 7-0 BV-1DA 4X24" M8702

## (undated) DEVICE — SYR EAR BULB 3OZ 0035830

## (undated) DEVICE — LINEN TOWEL PACK X5 5464

## (undated) DEVICE — PREP CHLORAPREP W/ORANGE TINT 10.5ML 260715

## (undated) DEVICE — LINEN LEG ROLL 5489

## (undated) DEVICE — KIT WASH CELL SAVING ATL2001

## (undated) DEVICE — SU PROLENE 7-0 BV175-6 24' M8737

## (undated) DEVICE — LEAD ELEC MYOCARDIO PACING TEMPORARY MEDTRONIC

## (undated) DEVICE — CONNECTOR DRAIN CHEST Y EXTENSION SET 19909

## (undated) DEVICE — SU PROLENE 7-0 BV-1DA 4X30" M8703

## (undated) DEVICE — SOMASENSOR CEREBRAL OXIMETER ADULT SAFB-SM

## (undated) DEVICE — LINEN TOWEL PACK X6 WHITE 5487

## (undated) DEVICE — SU ETHIBOND 3-0 BBDA 36" X588H

## (undated) DEVICE — CATH ANGIO JUDKINS JL4 6FRX100CM INFINITI 534620T

## (undated) DEVICE — PACK OPEN HEART PV12OH524

## (undated) DEVICE — SUCTION WAND 6FR 16.5CM

## (undated) DEVICE — MANIFOLD KIT ANGIO AUTOMATED 014613

## (undated) DEVICE — SU ETHIBOND 2-0 SHDA 30" X563H

## (undated) DEVICE — BLADE KNIFE BEAVER 6900

## (undated) DEVICE — SU PLEDGET SOFT TFE 3/8"X3/26"X1/16" PCP40

## (undated) DEVICE — SU PROLENE 4-0 SHDA 36" 8521H

## (undated) DEVICE — SU VICRYL 3-0 FS-1 27" J442H

## (undated) RX ORDER — HEPARIN SODIUM 1000 [USP'U]/ML
INJECTION, SOLUTION INTRAVENOUS; SUBCUTANEOUS
Status: DISPENSED
Start: 2019-03-12

## (undated) RX ORDER — CEFAZOLIN SODIUM 1 G/3ML
INJECTION, POWDER, FOR SOLUTION INTRAMUSCULAR; INTRAVENOUS
Status: DISPENSED
Start: 2019-03-12

## (undated) RX ORDER — PROTAMINE SULFATE 10 MG/ML
INJECTION, SOLUTION INTRAVENOUS
Status: DISPENSED
Start: 2019-03-12

## (undated) RX ORDER — GLYCOPYRROLATE 0.2 MG/ML
INJECTION, SOLUTION INTRAMUSCULAR; INTRAVENOUS
Status: DISPENSED
Start: 2019-03-12

## (undated) RX ORDER — FENTANYL CITRATE 50 UG/ML
INJECTION, SOLUTION INTRAMUSCULAR; INTRAVENOUS
Status: DISPENSED
Start: 2019-03-07

## (undated) RX ORDER — VECURONIUM BROMIDE 1 MG/ML
INJECTION, POWDER, LYOPHILIZED, FOR SOLUTION INTRAVENOUS
Status: DISPENSED
Start: 2019-03-12

## (undated) RX ORDER — LIDOCAINE HYDROCHLORIDE 10 MG/ML
INJECTION, SOLUTION EPIDURAL; INFILTRATION; INTRACAUDAL; PERINEURAL
Status: DISPENSED
Start: 2019-03-07

## (undated) RX ORDER — FENTANYL CITRATE 50 UG/ML
INJECTION, SOLUTION INTRAMUSCULAR; INTRAVENOUS
Status: DISPENSED
Start: 2019-03-12

## (undated) RX ORDER — PROPOFOL 10 MG/ML
INJECTION, EMULSION INTRAVENOUS
Status: DISPENSED
Start: 2019-03-12

## (undated) RX ORDER — FENTANYL CITRATE 0.05 MG/ML
INJECTION, SOLUTION INTRAMUSCULAR; INTRAVENOUS
Status: DISPENSED
Start: 2019-03-12

## (undated) RX ORDER — LIDOCAINE HYDROCHLORIDE 20 MG/ML
INJECTION, SOLUTION EPIDURAL; INFILTRATION; INTRACAUDAL; PERINEURAL
Status: DISPENSED
Start: 2019-03-12

## (undated) RX ORDER — CEFAZOLIN SODIUM IN 0.9 % NACL 3 G/100 ML
INTRAVENOUS SOLUTION, PIGGYBACK (ML) INTRAVENOUS
Status: DISPENSED
Start: 2019-03-12

## (undated) RX ORDER — HEPARIN SODIUM 1000 [USP'U]/ML
INJECTION, SOLUTION INTRAVENOUS; SUBCUTANEOUS
Status: DISPENSED
Start: 2019-03-07